# Patient Record
Sex: MALE | Race: WHITE | NOT HISPANIC OR LATINO | URBAN - METROPOLITAN AREA
[De-identification: names, ages, dates, MRNs, and addresses within clinical notes are randomized per-mention and may not be internally consistent; named-entity substitution may affect disease eponyms.]

---

## 2020-05-14 ENCOUNTER — APPOINTMENT (RX ONLY)
Dept: URBAN - METROPOLITAN AREA CLINIC 38 | Facility: CLINIC | Age: 80
Setting detail: DERMATOLOGY
End: 2020-05-14

## 2020-05-14 DIAGNOSIS — D22 MELANOCYTIC NEVI: ICD-10-CM

## 2020-05-14 DIAGNOSIS — L81.4 OTHER MELANIN HYPERPIGMENTATION: ICD-10-CM

## 2020-05-14 DIAGNOSIS — L57.8 OTHER SKIN CHANGES DUE TO CHRONIC EXPOSURE TO NONIONIZING RADIATION: ICD-10-CM

## 2020-05-14 DIAGNOSIS — D485 NEOPLASM OF UNCERTAIN BEHAVIOR OF SKIN: ICD-10-CM

## 2020-05-14 PROBLEM — D22.9 MELANOCYTIC NEVI, UNSPECIFIED: Status: ACTIVE | Noted: 2020-05-14

## 2020-05-14 PROBLEM — D48.5 NEOPLASM OF UNCERTAIN BEHAVIOR OF SKIN: Status: ACTIVE | Noted: 2020-05-14

## 2020-05-14 PROCEDURE — ? TREATMENT REGIMEN

## 2020-05-14 PROCEDURE — ? COUNSELING

## 2020-05-14 PROCEDURE — 99203 OFFICE O/P NEW LOW 30 MIN: CPT

## 2020-05-14 PROCEDURE — ? DEFER

## 2020-05-14 ASSESSMENT — LOCATION ZONE DERM
LOCATION ZONE: SCALP
LOCATION ZONE: FACE

## 2020-05-14 ASSESSMENT — LOCATION DETAILED DESCRIPTION DERM
LOCATION DETAILED: LEFT INFERIOR CENTRAL MALAR CHEEK
LOCATION DETAILED: RIGHT SUPERIOR PARIETAL SCALP
LOCATION DETAILED: LEFT CENTRAL FRONTAL SCALP
LOCATION DETAILED: RIGHT INFERIOR CENTRAL MALAR CHEEK
LOCATION DETAILED: RIGHT MEDIAL FRONTAL SCALP
LOCATION DETAILED: LEFT INFERIOR MEDIAL FOREHEAD
LOCATION DETAILED: RIGHT MEDIAL FOREHEAD
LOCATION DETAILED: LEFT SUPERIOR PARIETAL SCALP

## 2020-05-14 ASSESSMENT — LOCATION SIMPLE DESCRIPTION DERM
LOCATION SIMPLE: RIGHT FOREHEAD
LOCATION SIMPLE: SCALP
LOCATION SIMPLE: LEFT SCALP
LOCATION SIMPLE: RIGHT CHEEK
LOCATION SIMPLE: RIGHT SCALP
LOCATION SIMPLE: LEFT FOREHEAD
LOCATION SIMPLE: LEFT CHEEK

## 2020-05-14 NOTE — PROCEDURE: DEFER
Procedure To Be Performed At Next Visit: Biopsy by shave method
Detail Level: Detailed
Introduction Text (Please End With A Colon): The following procedure was deferred:
Instructions (Optional): SITE A. RIGHT ANTECUBITAL SPACE \\nSIZE: 0.6 cm x 1.2 cm\\nRULE OUT: BCC
Instructions (Optional): SITE B. CENTRAL MID SCALP\\nSIZE: 1.1 cm x 1.1 cm\\nRULE OUT: SCC

## 2020-05-14 NOTE — PROCEDURE: TREATMENT REGIMEN
Plan: 5-fu cream twice daily x 2 weeks- scalp first, then face.  Plan to start at next visit
Detail Level: Zone

## 2020-06-16 ENCOUNTER — APPOINTMENT (RX ONLY)
Dept: URBAN - METROPOLITAN AREA CLINIC 38 | Facility: CLINIC | Age: 80
Setting detail: DERMATOLOGY
End: 2020-06-16

## 2020-06-16 DIAGNOSIS — D485 NEOPLASM OF UNCERTAIN BEHAVIOR OF SKIN: ICD-10-CM

## 2020-06-16 DIAGNOSIS — D22 MELANOCYTIC NEVI: ICD-10-CM

## 2020-06-16 DIAGNOSIS — L57.8 OTHER SKIN CHANGES DUE TO CHRONIC EXPOSURE TO NONIONIZING RADIATION: ICD-10-CM

## 2020-06-16 DIAGNOSIS — L81.4 OTHER MELANIN HYPERPIGMENTATION: ICD-10-CM

## 2020-06-16 PROBLEM — D48.5 NEOPLASM OF UNCERTAIN BEHAVIOR OF SKIN: Status: ACTIVE | Noted: 2020-06-16

## 2020-06-16 PROBLEM — D22.9 MELANOCYTIC NEVI, UNSPECIFIED: Status: ACTIVE | Noted: 2020-06-16

## 2020-06-16 PROCEDURE — ? COUNSELING

## 2020-06-16 PROCEDURE — ? TREATMENT REGIMEN

## 2020-06-16 PROCEDURE — 11103 TANGNTL BX SKIN EA SEP/ADDL: CPT

## 2020-06-16 PROCEDURE — ? BIOPSY BY SHAVE METHOD

## 2020-06-16 PROCEDURE — 99213 OFFICE O/P EST LOW 20 MIN: CPT | Mod: 25

## 2020-06-16 PROCEDURE — 11102 TANGNTL BX SKIN SINGLE LES: CPT

## 2020-06-16 ASSESSMENT — LOCATION ZONE DERM
LOCATION ZONE: FACE
LOCATION ZONE: ARM
LOCATION ZONE: SCALP

## 2020-06-16 ASSESSMENT — LOCATION DETAILED DESCRIPTION DERM
LOCATION DETAILED: POSTERIOR MID-PARIETAL SCALP
LOCATION DETAILED: LEFT INFERIOR CENTRAL MALAR CHEEK
LOCATION DETAILED: RIGHT SUPERIOR PARIETAL SCALP
LOCATION DETAILED: RIGHT MEDIAL FRONTAL SCALP
LOCATION DETAILED: LEFT SUPERIOR PARIETAL SCALP
LOCATION DETAILED: RIGHT INFERIOR CENTRAL MALAR CHEEK
LOCATION DETAILED: RIGHT MEDIAL FOREHEAD
LOCATION DETAILED: RIGHT ANTECUBITAL SKIN
LOCATION DETAILED: LEFT INFERIOR MEDIAL FOREHEAD
LOCATION DETAILED: LEFT CENTRAL FRONTAL SCALP

## 2020-06-16 ASSESSMENT — LOCATION SIMPLE DESCRIPTION DERM
LOCATION SIMPLE: POSTERIOR SCALP
LOCATION SIMPLE: RIGHT CHEEK
LOCATION SIMPLE: RIGHT FOREHEAD
LOCATION SIMPLE: LEFT SCALP
LOCATION SIMPLE: RIGHT ELBOW
LOCATION SIMPLE: RIGHT SCALP
LOCATION SIMPLE: LEFT FOREHEAD
LOCATION SIMPLE: LEFT CHEEK
LOCATION SIMPLE: SCALP

## 2020-06-16 NOTE — PROCEDURE: TREATMENT REGIMEN
Plan: 5-fu cream twice daily x 2 weeks- scalp first, then face.  Defer for now discuss again at next visit
Detail Level: Zone

## 2020-06-16 NOTE — PROCEDURE: BIOPSY BY SHAVE METHOD
Body Location Override (Optional - Billing Will Still Be Based On Selected Body Map Location If Applicable): RIGHT ANTECUBITAL SPACE
Detail Level: Detailed
Depth Of Biopsy: dermis
Was A Bandage Applied: Yes
Size Of Lesion In Cm: 0.6
X Size Of Lesion In Cm: 1.2
Biopsy Type: H and E
Biopsy Method: Personna blade
Anesthesia Type: 1% lidocaine with epinephrine
Anesthesia Volume In Cc (Will Not Render If 0): 0.5
Additional Anesthesia Volume In Cc (Will Not Render If 0): 0
Hemostasis: Drysol
Wound Care: Bacitracin
Dressing: Band-Aid
Destruction After The Procedure: No
Type Of Destruction Used: Curettage
Curettage Text: The wound bed was treated with curettage after the biopsy was performed.
Cryotherapy Text: The wound bed was treated with cryotherapy after the biopsy was performed.
Electrodesiccation Text: The wound bed was treated with electrodesiccation after the biopsy was performed.
Electrodesiccation And Curettage Text: The wound bed was treated with electrodesiccation and curettage after the biopsy was performed.
Silver Nitrate Text: The wound bed was treated with silver nitrate after the biopsy was performed.
Lab: [id number]
Path Notes (To The Dermatopathologist): Size: 0.6 cm x 1.2 cm\\nRule Out: BCC
Consent: Written consent was obtained and risks were reviewed including but not limited to scarring, infection, bleeding, scabbing, incomplete removal, nerve damage and allergy to anesthesia.
Post-Care Instructions: I reviewed with the patient in detail post-care instructions. Patient is to keep the biopsy site dry overnight, and then apply bacitracin twice daily until healed. Patient may apply hydrogen peroxide soaks to remove any crusting.
Notification Instructions: Patient will be notified of biopsy results. However, patient instructed to call the office if not contacted within 2 weeks.
Billing Type: [employer]
Information: Selecting Yes will display possible errors in your note based on the variables you have selected. This validation is only offered as a suggestion for you. PLEASE NOTE THAT THE VALIDATION TEXT WILL BE REMOVED WHEN YOU FINALIZE YOUR NOTE. IF YOU WANT TO FAX A PRELIMINARY NOTE YOU WILL NEED TO TOGGLE THIS TO 'NO' IF YOU DO NOT WANT IT IN YOUR FAXED NOTE.
Body Location Override (Optional - Billing Will Still Be Based On Selected Body Map Location If Applicable): CENTRAL MID SCALP
Size Of Lesion In Cm: 1.1
Lab: [id number]
Path Notes (To The Dermatopathologist): Size: 1.1 cm x 1.1 cm\\nRule Out: SCC
Billing Type: Third-Party Bill

## 2020-07-01 ENCOUNTER — APPOINTMENT (RX ONLY)
Dept: URBAN - METROPOLITAN AREA CLINIC 38 | Facility: CLINIC | Age: 80
Setting detail: DERMATOLOGY
End: 2020-07-01

## 2020-07-01 DIAGNOSIS — D22 MELANOCYTIC NEVI: ICD-10-CM

## 2020-07-01 PROBLEM — D22.9 MELANOCYTIC NEVI, UNSPECIFIED: Status: ACTIVE | Noted: 2020-07-01

## 2020-07-01 PROBLEM — D04.9 CARCINOMA IN SITU OF SKIN, UNSPECIFIED: Status: ACTIVE | Noted: 2020-07-01

## 2020-07-01 PROCEDURE — 99213 OFFICE O/P EST LOW 20 MIN: CPT

## 2020-07-01 PROCEDURE — ? DEFER

## 2020-07-01 PROCEDURE — ? COUNSELING

## 2020-07-01 PROCEDURE — ? PATHOLOGY DISCUSSION

## 2020-07-01 PROCEDURE — ? ADDITIONAL NOTES

## 2020-07-01 NOTE — PROCEDURE: ADDITIONAL NOTES
Detail Level: Simple
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 Pt verbalized understanding and prefers to continue using Blood Root to tx.  Will maybe consider excision

## 2020-09-28 ENCOUNTER — APPOINTMENT (RX ONLY)
Dept: URBAN - METROPOLITAN AREA CLINIC 38 | Facility: CLINIC | Age: 80
Setting detail: DERMATOLOGY
End: 2020-09-28

## 2020-09-28 DIAGNOSIS — L57.0 ACTINIC KERATOSIS: ICD-10-CM | Status: INADEQUATELY CONTROLLED

## 2020-09-28 PROBLEM — D04.61 CARCINOMA IN SITU OF SKIN OF RIGHT UPPER LIMB, INCLUDING SHOULDER: Status: ACTIVE | Noted: 2020-09-28

## 2020-09-28 PROCEDURE — ? DEFER

## 2020-09-28 PROCEDURE — ? TREATMENT REGIMEN

## 2020-09-28 PROCEDURE — 99213 OFFICE O/P EST LOW 20 MIN: CPT

## 2020-09-28 PROCEDURE — ? COUNSELING

## 2020-09-28 ASSESSMENT — LOCATION DETAILED DESCRIPTION DERM
LOCATION DETAILED: RIGHT CENTRAL PARIETAL SCALP
LOCATION DETAILED: LEFT SUPERIOR MEDIAL FOREHEAD
LOCATION DETAILED: LEFT CENTRAL PARIETAL SCALP
LOCATION DETAILED: RIGHT SUPERIOR PARIETAL SCALP
LOCATION DETAILED: LEFT SUPERIOR PARIETAL SCALP

## 2020-09-28 ASSESSMENT — LOCATION ZONE DERM
LOCATION ZONE: FACE
LOCATION ZONE: SCALP

## 2020-09-28 ASSESSMENT — LOCATION SIMPLE DESCRIPTION DERM
LOCATION SIMPLE: LEFT FOREHEAD
LOCATION SIMPLE: SCALP

## 2020-09-28 NOTE — PROCEDURE: TREATMENT REGIMEN
Follow up neuro
Detail Level: Zone
Plan: No evidence of recurrence, will continue to observe. Pt continues to refuse treatment.

## 2020-09-28 NOTE — PROCEDURE: MIPS QUALITY
Quality 226: Preventive Care And Screening: Tobacco Use: Screening And Cessation Intervention: Patient screened for tobacco use and is an ex/non-smoker
Quality 111:Pneumonia Vaccination Status For Older Adults: Pneumococcal Vaccination Previously Received
Quality 130: Documentation Of Current Medications In The Medical Record: Current Medications Documented
Detail Level: Detailed
Quality 431: Preventive Care And Screening: Unhealthy Alcohol Use - Screening: Patient screened for unhealthy alcohol use using a single question and scores less than 2 times per year
Quality 110: Preventive Care And Screening: Influenza Immunization: Influenza immunization was not ordered or administered, reason not given

## 2020-09-28 NOTE — PROCEDURE: DEFER
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
Introduction Text (Please End With A Colon): The following procedure was deferred:
Detail Level: Detailed

## 2021-01-06 ENCOUNTER — APPOINTMENT (RX ONLY)
Dept: URBAN - METROPOLITAN AREA CLINIC 38 | Facility: CLINIC | Age: 81
Setting detail: DERMATOLOGY
End: 2021-01-06

## 2021-01-06 DIAGNOSIS — L57.0 ACTINIC KERATOSIS: ICD-10-CM

## 2021-01-06 PROCEDURE — ? COUNSELING

## 2021-01-06 PROCEDURE — 17004 DESTROY PREMAL LESIONS 15/>: CPT

## 2021-01-06 PROCEDURE — ? LIQUID NITROGEN

## 2021-01-06 ASSESSMENT — LOCATION DETAILED DESCRIPTION DERM
LOCATION DETAILED: RIGHT SUPERIOR FOREHEAD
LOCATION DETAILED: LEFT SUPERIOR PARIETAL SCALP
LOCATION DETAILED: LEFT SUPERIOR LATERAL NECK
LOCATION DETAILED: RIGHT MEDIAL FRONTAL SCALP
LOCATION DETAILED: RIGHT SUPERIOR HELIX
LOCATION DETAILED: RIGHT SUPERIOR PARIETAL SCALP
LOCATION DETAILED: RIGHT INFERIOR POSTAURICULAR SKIN
LOCATION DETAILED: LEFT CENTRAL FRONTAL SCALP
LOCATION DETAILED: LEFT MEDIAL FRONTAL SCALP
LOCATION DETAILED: RIGHT CENTRAL POSTAURICULAR SKIN
LOCATION DETAILED: LEFT INFERIOR LATERAL MALAR CHEEK
LOCATION DETAILED: LEFT MEDIAL ZYGOMA
LOCATION DETAILED: RIGHT SUPERIOR LATERAL NECK
LOCATION DETAILED: RIGHT SUPERIOR MEDIAL FOREHEAD
LOCATION DETAILED: POSTERIOR MID-PARIETAL SCALP
LOCATION DETAILED: LEFT SUPERIOR HELIX
LOCATION DETAILED: LEFT SUPERIOR FOREHEAD

## 2021-01-06 ASSESSMENT — LOCATION SIMPLE DESCRIPTION DERM
LOCATION SIMPLE: LEFT ZYGOMA
LOCATION SIMPLE: POSTERIOR SCALP
LOCATION SIMPLE: LEFT EAR
LOCATION SIMPLE: RIGHT EAR
LOCATION SIMPLE: LEFT FOREHEAD
LOCATION SIMPLE: RIGHT FOREHEAD
LOCATION SIMPLE: NECK
LOCATION SIMPLE: SCALP
LOCATION SIMPLE: LEFT SCALP
LOCATION SIMPLE: RIGHT SCALP
LOCATION SIMPLE: LEFT CHEEK

## 2021-01-06 ASSESSMENT — LOCATION ZONE DERM
LOCATION ZONE: NECK
LOCATION ZONE: SCALP
LOCATION ZONE: EAR
LOCATION ZONE: FACE

## 2021-01-06 NOTE — PROCEDURE: LIQUID NITROGEN
Number Of Freeze-Thaw Cycles: 3 freeze-thaw cycles
Duration Of Freeze Thaw-Cycle (Seconds): 3
Render Post-Care Instructions In Note?: no
Consent: The patient's consent was obtained including but not limited to risks of crusting, scabbing, blistering, scarring, darker or lighter pigmentary change, recurrence, incomplete removal and infection.
Post-Care Instructions: I reviewed with the patient in detail post-care instructions. Patient is to wear sunprotection, and avoid picking at any of the treated lesions. Pt may apply Vaseline to crusted or scabbing areas.
Detail Level: Detailed

## 2021-11-15 ENCOUNTER — APPOINTMENT (RX ONLY)
Dept: URBAN - METROPOLITAN AREA CLINIC 38 | Facility: CLINIC | Age: 81
Setting detail: DERMATOLOGY
End: 2021-11-15

## 2021-11-15 DIAGNOSIS — D22 MELANOCYTIC NEVI: ICD-10-CM

## 2021-11-15 DIAGNOSIS — L57.0 ACTINIC KERATOSIS: ICD-10-CM

## 2021-11-15 DIAGNOSIS — L82.0 INFLAMED SEBORRHEIC KERATOSIS: ICD-10-CM

## 2021-11-15 DIAGNOSIS — L81.4 OTHER MELANIN HYPERPIGMENTATION: ICD-10-CM

## 2021-11-15 DIAGNOSIS — D485 NEOPLASM OF UNCERTAIN BEHAVIOR OF SKIN: ICD-10-CM

## 2021-11-15 PROBLEM — D22.9 MELANOCYTIC NEVI, UNSPECIFIED: Status: ACTIVE | Noted: 2021-11-15

## 2021-11-15 PROBLEM — D48.5 NEOPLASM OF UNCERTAIN BEHAVIOR OF SKIN: Status: ACTIVE | Noted: 2021-11-15

## 2021-11-15 PROCEDURE — ? DEFER

## 2021-11-15 PROCEDURE — 99213 OFFICE O/P EST LOW 20 MIN: CPT

## 2021-11-15 PROCEDURE — ? SUNSCREEN RECOMMENDATIONS

## 2021-11-15 PROCEDURE — ? PRESCRIPTION

## 2021-11-15 PROCEDURE — ? COUNSELING

## 2021-11-15 RX ORDER — IMIQUIMOD 50 MG/G
CREAM TOPICAL
Qty: 24 | Refills: 3 | Status: ERX | COMMUNITY
Start: 2021-11-15

## 2021-11-15 RX ADMIN — IMIQUIMOD: 50 CREAM TOPICAL at 00:00

## 2021-11-15 ASSESSMENT — LOCATION SIMPLE DESCRIPTION DERM
LOCATION SIMPLE: CHEST
LOCATION SIMPLE: LEFT NOSE
LOCATION SIMPLE: LEFT FOREHEAD
LOCATION SIMPLE: CHIN
LOCATION SIMPLE: LEFT CHEEK
LOCATION SIMPLE: LEFT FOREARM
LOCATION SIMPLE: RIGHT CHEEK
LOCATION SIMPLE: RIGHT TEMPLE
LOCATION SIMPLE: RIGHT UPPER ARM
LOCATION SIMPLE: LEFT SCALP
LOCATION SIMPLE: RIGHT FOREARM

## 2021-11-15 ASSESSMENT — LOCATION ZONE DERM
LOCATION ZONE: NOSE
LOCATION ZONE: TRUNK
LOCATION ZONE: SCALP
LOCATION ZONE: ARM
LOCATION ZONE: FACE

## 2021-11-15 ASSESSMENT — LOCATION DETAILED DESCRIPTION DERM
LOCATION DETAILED: LEFT MEDIAL FRONTAL SCALP
LOCATION DETAILED: RIGHT MEDIAL SUPERIOR CHEST
LOCATION DETAILED: LEFT CENTRAL MALAR CHEEK
LOCATION DETAILED: LEFT NASAL SIDEWALL
LOCATION DETAILED: RIGHT DISTAL POSTERIOR UPPER ARM
LOCATION DETAILED: RIGHT CENTRAL TEMPLE
LOCATION DETAILED: LEFT PROXIMAL DORSAL FOREARM
LOCATION DETAILED: RIGHT INFERIOR CENTRAL MALAR CHEEK
LOCATION DETAILED: RIGHT CENTRAL MALAR CHEEK
LOCATION DETAILED: LEFT MEDIAL FOREHEAD
LOCATION DETAILED: RIGHT PROXIMAL DORSAL FOREARM
LOCATION DETAILED: LEFT CHIN

## 2021-11-15 NOTE — PROCEDURE: DEFER
Introduction Text (Please End With A Colon): The following procedure was deferred:
Detail Level: Detailed
Instructions (Optional): ARMS, FACE, NOSE, EARS, SCALP\\nAkâs x 15
Scheduling Instructions (Optional): [id number]
Procedure To Be Performed At Next Visit: Cryotherapy
Instructions (Optional): ISK x 20 Face
Scheduling Instructions (Optional): [address]
Instructions (Optional): A. Right distal brachium \\n0. 6 cm\\nR/o DN\\n\\nB. Right temple \\n1.1 cm \\nR/o SCC vs BCC\\n\\nC.  Right superior chest \\n1.3 cm \\nSCC vs BCC
Procedure To Be Performed At Next Visit: Biopsy by shave method
Scheduling Instructions (Optional): [address] [facility], [address] [facility], [id number]

## 2021-12-13 ENCOUNTER — APPOINTMENT (RX ONLY)
Dept: URBAN - METROPOLITAN AREA CLINIC 38 | Facility: CLINIC | Age: 81
Setting detail: DERMATOLOGY
End: 2021-12-13

## 2021-12-13 DIAGNOSIS — L82.0 INFLAMED SEBORRHEIC KERATOSIS: ICD-10-CM

## 2021-12-13 DIAGNOSIS — D22 MELANOCYTIC NEVI: ICD-10-CM

## 2021-12-13 DIAGNOSIS — D485 NEOPLASM OF UNCERTAIN BEHAVIOR OF SKIN: ICD-10-CM | Status: STABLE

## 2021-12-13 DIAGNOSIS — L57.0 ACTINIC KERATOSIS: ICD-10-CM

## 2021-12-13 DIAGNOSIS — L81.4 OTHER MELANIN HYPERPIGMENTATION: ICD-10-CM

## 2021-12-13 DIAGNOSIS — B07.8 OTHER VIRAL WARTS: ICD-10-CM

## 2021-12-13 PROBLEM — D22.9 MELANOCYTIC NEVI, UNSPECIFIED: Status: ACTIVE | Noted: 2021-12-13

## 2021-12-13 PROBLEM — D48.5 NEOPLASM OF UNCERTAIN BEHAVIOR OF SKIN: Status: ACTIVE | Noted: 2021-12-13

## 2021-12-13 PROCEDURE — 17003 DESTRUCT PREMALG LES 2-14: CPT | Mod: 59

## 2021-12-13 PROCEDURE — 11103 TANGNTL BX SKIN EA SEP/ADDL: CPT | Mod: 59

## 2021-12-13 PROCEDURE — 11102 TANGNTL BX SKIN SINGLE LES: CPT | Mod: 59

## 2021-12-13 PROCEDURE — ? BIOPSY BY SHAVE METHOD

## 2021-12-13 PROCEDURE — 11301 SHAVE SKIN LESION 0.6-1.0 CM: CPT

## 2021-12-13 PROCEDURE — ? COUNSELING

## 2021-12-13 PROCEDURE — ? PHOTO-DOCUMENTATION

## 2021-12-13 PROCEDURE — ? LIQUID NITROGEN

## 2021-12-13 PROCEDURE — ? SUNSCREEN RECOMMENDATIONS

## 2021-12-13 PROCEDURE — ? SHAVE REMOVAL

## 2021-12-13 PROCEDURE — 17110 DESTRUCTION B9 LES UP TO 14: CPT | Mod: 59

## 2021-12-13 PROCEDURE — 17000 DESTRUCT PREMALG LESION: CPT | Mod: 59

## 2021-12-13 PROCEDURE — ? DEFER

## 2021-12-13 ASSESSMENT — LOCATION ZONE DERM
LOCATION ZONE: ARM
LOCATION ZONE: FACE
LOCATION ZONE: FINGER
LOCATION ZONE: TRUNK
LOCATION ZONE: NOSE
LOCATION ZONE: SCALP
LOCATION ZONE: HAND

## 2021-12-13 ASSESSMENT — LOCATION DETAILED DESCRIPTION DERM
LOCATION DETAILED: RIGHT VENTRAL DISTAL FOREARM
LOCATION DETAILED: RIGHT PROXIMAL POSTERIOR UPPER ARM
LOCATION DETAILED: RIGHT CENTRAL MALAR CHEEK
LOCATION DETAILED: LEFT PROXIMAL POSTERIOR UPPER ARM
LOCATION DETAILED: RIGHT SUPERIOR PARIETAL SCALP
LOCATION DETAILED: LEFT SUPERIOR PARIETAL SCALP
LOCATION DETAILED: RIGHT PROXIMAL DORSAL FOREARM
LOCATION DETAILED: LEFT LATERAL MALAR CHEEK
LOCATION DETAILED: RIGHT RADIAL DORSAL HAND
LOCATION DETAILED: RIGHT MEDIAL SUPERIOR CHEST
LOCATION DETAILED: RIGHT MID RADIAL DORSAL INDEX FINGER
LOCATION DETAILED: LEFT CENTRAL TEMPLE
LOCATION DETAILED: LEFT PROXIMAL DORSAL FOREARM
LOCATION DETAILED: LEFT DISTAL DORSAL FOREARM
LOCATION DETAILED: LEFT SUPERIOR MEDIAL FOREHEAD
LOCATION DETAILED: RIGHT MEDIAL TEMPLE
LOCATION DETAILED: LEFT CENTRAL MALAR CHEEK
LOCATION DETAILED: RIGHT DISTAL DORSAL FOREARM
LOCATION DETAILED: RIGHT LATERAL ELBOW
LOCATION DETAILED: NASAL SUPRATIP
LOCATION DETAILED: NASAL DORSUM

## 2021-12-13 ASSESSMENT — LOCATION SIMPLE DESCRIPTION DERM
LOCATION SIMPLE: RIGHT ELBOW
LOCATION SIMPLE: LEFT FOREARM
LOCATION SIMPLE: RIGHT CHEEK
LOCATION SIMPLE: LEFT UPPER ARM
LOCATION SIMPLE: LEFT TEMPLE
LOCATION SIMPLE: LEFT FOREHEAD
LOCATION SIMPLE: RIGHT INDEX FINGER
LOCATION SIMPLE: CHEST
LOCATION SIMPLE: RIGHT FOREARM
LOCATION SIMPLE: LEFT CHEEK
LOCATION SIMPLE: SCALP
LOCATION SIMPLE: NOSE
LOCATION SIMPLE: RIGHT UPPER ARM
LOCATION SIMPLE: RIGHT TEMPLE
LOCATION SIMPLE: RIGHT HAND

## 2021-12-13 NOTE — PROCEDURE: LIQUID NITROGEN
Detail Level: Zone
Show Applicator Variable?: Yes
Render Note In Bullet Format When Appropriate: No
Number Of Freeze-Thaw Cycles: 3 freeze-thaw cycles
Consent: The patient's consent was obtained including but not limited to risks of crusting, scabbing, blistering, scarring, darker or lighter pigmentary change, recurrence, incomplete removal and infection.
Post-Care Instructions: I reviewed with the patient in detail post-care instructions. Patient is to wear sunprotection, and avoid picking at any of the treated lesions. Pt may apply Vaseline to crusted or scabbing areas.
Duration Of Freeze Thaw-Cycle (Seconds): 1
Medical Necessity Clause: This procedure was medically necessary because the lesions that were treated were:
Medical Necessity Information: It is in your best interest to select a reason for this procedure from the list below. All of these items fulfill various CMS LCD requirements except the new and changing color options.

## 2021-12-13 NOTE — PROCEDURE: BIOPSY BY SHAVE METHOD
Body Location Override (Optional - Billing Will Still Be Based On Selected Body Map Location If Applicable): RIGHT TEMPLE
Detail Level: Detailed
Depth Of Biopsy: dermis
Was A Bandage Applied: Yes
Size Of Lesion In Cm: 1.1
X Size Of Lesion In Cm: 0
Biopsy Type: H and E
Biopsy Method: Dermablade
Anesthesia Type: 1% lidocaine with epinephrine
Anesthesia Volume In Cc (Will Not Render If 0): 0.5
Hemostasis: Electrocautery
Wound Care: Bacitracin
Dressing: Band-Aid
Destruction After The Procedure: No
Type Of Destruction Used: Curettage
Curettage Text: The wound bed was treated with curettage after the biopsy was performed.
Cryotherapy Text: The wound bed was treated with cryotherapy after the biopsy was performed.
Electrodesiccation Text: The wound bed was treated with electrodesiccation after the biopsy was performed.
Electrodesiccation And Curettage Text: The wound bed was treated with electrodesiccation and curettage after the biopsy was performed.
Silver Nitrate Text: The wound bed was treated with silver nitrate after the biopsy was performed.
Path Notes (To The Dermatopathologist): Size: 1.1 cm\\nRule out: SCC vs. BCC
Consent: Written consent was obtained and risks were reviewed including but not limited to scarring, infection, bleeding, scabbing, incomplete removal, nerve damage and allergy to anesthesia.
Post-Care Instructions: I reviewed with the patient in detail post-care instructions. Patient is to keep the biopsy site dry overnight, and then apply bacitracin twice daily until healed. Patient may apply hydrogen peroxide soaks to remove any crusting.
Notification Instructions: Patient will be notified of biopsy results. However, patient instructed to call the office if not contacted within 2 weeks.
Billing Type: [employer]
Information: Selecting Yes will display possible errors in your note based on the variables you have selected. This validation is only offered as a suggestion for you. PLEASE NOTE THAT THE VALIDATION TEXT WILL BE REMOVED WHEN YOU FINALIZE YOUR NOTE. IF YOU WANT TO FAX A PRELIMINARY NOTE YOU WILL NEED TO TOGGLE THIS TO 'NO' IF YOU DO NOT WANT IT IN YOUR FAXED NOTE.
Body Location Override (Optional - Billing Will Still Be Based On Selected Body Map Location If Applicable): RIGHT SUPERIOR CHEST
Size Of Lesion In Cm: 1.3
Path Notes (To The Dermatopathologist): Size: 1.3 cm\\nRule out: SCC vs. BCC
Billing Type: Third-Party Bill

## 2021-12-13 NOTE — PROCEDURE: SHAVE REMOVAL
Medical Necessity Information: It is in your best interest to select a reason for this procedure from the list below. All of these items fulfill various CMS LCD requirements except the new and changing color options.
Medical Necessity Clause: This procedure was medically necessary because the lesion that was treated was
Body Location Override (Optional - Billing Will Still Be Based On Selected Body Map Location If Applicable): RIGHT DISTAL BRACHIUM
Detail Level: Detailed
Was A Bandage Applied: Yes
Size Of Lesion In Cm (Required): 0.6
X Size Of Lesion In Cm (Optional): 0
Biopsy Method: Dermablade
Anesthesia Type: 1% lidocaine with epinephrine
Anesthesia Volume In Cc: 0.5
Hemostasis: Electrocautery
Wound Care: Bacitracin
Path Notes (To The Dermatopathologist): Size: 0.6 cm \\nrule out: DN
Render Path Notes In Note?: No
Consent was obtained from the patient. The risks and benefits to therapy were discussed in detail. Specifically, the risks of infection, scarring, bleeding, prolonged wound healing, incomplete removal, allergy to anesthesia, nerve injury and recurrence were addressed. Prior to the procedure, the treatment site was clearly identified and confirmed by the patient. All components of Universal Protocol/PAUSE Rule completed.
Post-Care Instructions: I reviewed with the patient in detail post-care instructions. Patient is to keep the biopsy site dry overnight, and then apply bacitracin twice daily until healed. Patient may apply hydrogen peroxide soaks to remove any crusting.
Notification Instructions: Patient will be notified of pathology results. However, patient instructed to call the office if not contacted within 2 weeks.
Billing Type: [employer]

## 2021-12-13 NOTE — PROCEDURE: DEFER
Procedure To Be Performed At Next Visit: Liquid nitrogen
Detail Level: Detailed
Introduction Text (Please End With A Colon): Preferred codes deferred:\\[id number], [id number]
Instructions (Optional): RIGHT INDEX FINGER

## 2022-01-19 ENCOUNTER — APPOINTMENT (RX ONLY)
Dept: URBAN - METROPOLITAN AREA CLINIC 38 | Facility: CLINIC | Age: 82
Setting detail: DERMATOLOGY
End: 2022-01-19

## 2022-01-19 DIAGNOSIS — D22 MELANOCYTIC NEVI: ICD-10-CM

## 2022-01-19 DIAGNOSIS — L57.0 ACTINIC KERATOSIS: ICD-10-CM

## 2022-01-19 DIAGNOSIS — L82.1 OTHER SEBORRHEIC KERATOSIS: ICD-10-CM

## 2022-01-19 DIAGNOSIS — L81.4 OTHER MELANIN HYPERPIGMENTATION: ICD-10-CM

## 2022-01-19 PROBLEM — D22.9 MELANOCYTIC NEVI, UNSPECIFIED: Status: ACTIVE | Noted: 2022-01-19

## 2022-01-19 PROCEDURE — ? LIQUID NITROGEN

## 2022-01-19 PROCEDURE — ? COUNSELING

## 2022-01-19 PROCEDURE — 99213 OFFICE O/P EST LOW 20 MIN: CPT | Mod: 25

## 2022-01-19 PROCEDURE — ? SUNSCREEN RECOMMENDATIONS

## 2022-01-19 PROCEDURE — ? DEFER

## 2022-01-19 PROCEDURE — ? PATHOLOGY DISCUSSION

## 2022-01-19 PROCEDURE — 17003 DESTRUCT PREMALG LES 2-14: CPT

## 2022-01-19 PROCEDURE — 17000 DESTRUCT PREMALG LESION: CPT

## 2022-01-19 ASSESSMENT — LOCATION DETAILED DESCRIPTION DERM
LOCATION DETAILED: RIGHT INFERIOR FOREHEAD
LOCATION DETAILED: RIGHT MEDIAL SUPERIOR CHEST

## 2022-01-19 ASSESSMENT — LOCATION ZONE DERM
LOCATION ZONE: TRUNK
LOCATION ZONE: FACE

## 2022-01-19 ASSESSMENT — LOCATION SIMPLE DESCRIPTION DERM
LOCATION SIMPLE: CHEST
LOCATION SIMPLE: RIGHT FOREHEAD

## 2022-01-19 NOTE — PROCEDURE: DEFER
Procedure To Be Performed At Next Visit: Cryotherapy
Introduction Text (Please End With A Colon): Preferred codes deferred:\\[id number], [id number], [id number]
Detail Level: Detailed
Instructions (Optional): [address] [facility]

## 2022-01-19 NOTE — PROCEDURE: LIQUID NITROGEN
Show Applicator Variable?: Yes
Render Note In Bullet Format When Appropriate: No
Consent: The patient's consent was obtained including but not limited to risks of crusting, scabbing, blistering, scarring, darker or lighter pigmentary change, recurrence, incomplete removal and infection.
Detail Level: Zone
Number Of Freeze-Thaw Cycles: 3 freeze-thaw cycles
Post-Care Instructions: I reviewed with the patient in detail post-care instructions. Patient is to wear sunprotection, and avoid picking at any of the treated lesions. Pt may apply Vaseline to crusted or scabbing areas.
Duration Of Freeze Thaw-Cycle (Seconds): 1

## 2022-01-19 NOTE — PROCEDURE: MIPS QUALITY
Quality 226: Preventive Care And Screening: Tobacco Use: Screening And Cessation Intervention: Patient screened for tobacco use and is an ex/non-smoker
Quality 111:Pneumonia Vaccination Status For Older Adults: Pneumococcal Vaccination Previously Received
Quality 130: Documentation Of Current Medications In The Medical Record: Current Medications Documented
Detail Level: Detailed
Quality 431: Preventive Care And Screening: Unhealthy Alcohol Use - Screening: Patient screened for unhealthy alcohol use using a single question and scores less than 2 times per year
Quality 265: Biopsy Follow-Up: Biopsy results reviewed, communicated, tracked, and documented
Quality 110: Preventive Care And Screening: Influenza Immunization: Influenza immunization was not ordered or administered, reason not given

## 2022-06-03 ENCOUNTER — TELEPHONE (OUTPATIENT)
Dept: HEALTH INFORMATION MANAGEMENT | Facility: OTHER | Age: 82
End: 2022-06-03

## 2022-07-11 ENCOUNTER — OFFICE VISIT (OUTPATIENT)
Dept: INTERNAL MEDICINE | Facility: OTHER | Age: 82
End: 2022-07-11
Payer: MEDICARE

## 2022-07-11 VITALS
HEART RATE: 68 BPM | OXYGEN SATURATION: 95 % | DIASTOLIC BLOOD PRESSURE: 70 MMHG | BODY MASS INDEX: 27.7 KG/M2 | SYSTOLIC BLOOD PRESSURE: 127 MMHG | WEIGHT: 187 LBS | HEIGHT: 69 IN | TEMPERATURE: 98 F

## 2022-07-11 DIAGNOSIS — R21 FACIAL RASH: ICD-10-CM

## 2022-07-11 DIAGNOSIS — E11.9 TYPE 2 DIABETES MELLITUS WITHOUT COMPLICATION, UNSPECIFIED WHETHER LONG TERM INSULIN USE (HCC): ICD-10-CM

## 2022-07-11 DIAGNOSIS — I48.11 LONGSTANDING PERSISTENT ATRIAL FIBRILLATION (HCC): ICD-10-CM

## 2022-07-11 DIAGNOSIS — Z53.20 COLONOSCOPY REFUSED: ICD-10-CM

## 2022-07-11 DIAGNOSIS — H91.93 BILATERAL HEARING LOSS, UNSPECIFIED HEARING LOSS TYPE: ICD-10-CM

## 2022-07-11 DIAGNOSIS — I48.0 PAROXYSMAL ATRIAL FIBRILLATION (HCC): ICD-10-CM

## 2022-07-11 DIAGNOSIS — Z12.5 PROSTATE CANCER SCREENING: ICD-10-CM

## 2022-07-11 DIAGNOSIS — E03.9 HYPOTHYROIDISM, UNSPECIFIED TYPE: ICD-10-CM

## 2022-07-11 PROCEDURE — 99204 OFFICE O/P NEW MOD 45 MIN: CPT | Mod: GC

## 2022-07-11 RX ORDER — APIXABAN 5 MG/1
5 TABLET, FILM COATED ORAL 2 TIMES DAILY
COMMUNITY
Start: 2022-06-19 | End: 2022-07-20 | Stop reason: SDUPTHER

## 2022-07-11 RX ORDER — ZINC SULFATE 50(220)MG
50 CAPSULE ORAL NIGHTLY
COMMUNITY

## 2022-07-11 RX ORDER — THYROID 120 MG/1
120 TABLET ORAL DAILY
COMMUNITY
End: 2022-07-25 | Stop reason: SDUPTHER

## 2022-07-11 RX ORDER — VITAMIN B COMPLEX
5000 TABLET ORAL
COMMUNITY

## 2022-07-11 RX ORDER — GLIMEPIRIDE 2 MG/1
TABLET ORAL
COMMUNITY
Start: 2022-07-05 | End: 2022-07-25 | Stop reason: SDUPTHER

## 2022-07-11 RX ORDER — FUROSEMIDE 20 MG/1
20 TABLET ORAL 2 TIMES DAILY
COMMUNITY
End: 2022-07-25 | Stop reason: SDUPTHER

## 2022-07-11 RX ORDER — ASCORBIC ACID 500 MG
500 TABLET ORAL 2 TIMES DAILY
COMMUNITY

## 2022-07-11 RX ORDER — SIMVASTATIN 20 MG
20 TABLET ORAL EVERY EVENING
COMMUNITY
Start: 2022-05-25 | End: 2022-07-25 | Stop reason: SDUPTHER

## 2022-07-11 RX ORDER — METOPROLOL TARTRATE 50 MG/1
50 TABLET, FILM COATED ORAL 2 TIMES DAILY WITH MEALS
COMMUNITY
Start: 2022-06-08 | End: 2022-07-25 | Stop reason: SDUPTHER

## 2022-07-11 RX ORDER — LANCETS 28 GAUGE
EACH MISCELLANEOUS
COMMUNITY
Start: 2022-06-30 | End: 2022-08-01 | Stop reason: SDUPTHER

## 2022-07-11 RX ORDER — ASPIRIN 81 MG/1
81 TABLET, CHEWABLE ORAL DAILY
COMMUNITY
End: 2023-10-25

## 2022-07-11 ASSESSMENT — PATIENT HEALTH QUESTIONNAIRE - PHQ9: CLINICAL INTERPRETATION OF PHQ2 SCORE: 0

## 2022-07-11 NOTE — PATIENT INSTRUCTIONS
-You came to the clinic today to establish care with me.  -You are overall very healthy, we made no changes to medications at this time.  -Since he recently moved from [place], would like to request records from your cardiologist in [place], [place] and your PCP in [place], [place].  -I also ordered some basic lab work to establish a baseline: CBC, CMP, TSH, hemoglobin A1c, lipid panel, PSA.  -We also discussed your skin rashes, please continue to use moisturizing cream as discussed.  I will also place referral for dermatology.  -Return to clinic in 5 weeks

## 2022-07-11 NOTE — LETTER
[facility]  Mary Munoz M.D.  [address]   [place] [address]  Fax: [phone]   Authorization for Release/Disclosure of   Protected Health Information   Name: QI DIAZ LUDIVINA : 1940 SSN: [id number]   Address: [facility] [address]  [place] [address] Phone:    [phone] (home)    I authorize the entity listed below to release/disclose the PHI below to:   [facility]/Mary Munoz M.D. and Mary Munoz M.D.   Provider or Entity Name: Misael Moss MD   [address] [facility] [address]   Phone:  [phone]    Fax:     Reason for request: continuity of care   Information to be released:    [  ] LAST COLONOSCOPY,  including any PATH REPORT and follow-up  [  ] LAST FIT/COLOGUARD RESULT [  ] LAST DEXA  [  ] LAST MAMMOGRAM  [  ] LAST PAP  [  ] LAST LABS [  ] RETINA EXAM REPORT  [  ] IMMUNIZATION RECORDS  [ XXX ] Release all info      [  ] Check here and initial the line next to each item to release ALL health information INCLUDING  _____ Care and treatment for drug and / or alcohol abuse  _____ HIV testing, infection status, or AIDS  _____ Genetic Testing    DATES OF SERVICE OR TIME PERIOD TO BE DISCLOSED: _____________  I understand and acknowledge that:  * This Authorization may be revoked at any time by you in writing, except if your health information has already been used or disclosed.  * Your health information that will be used or disclosed as a result of you signing this authorization could be re-disclosed by the recipient. If this occurs, your re-disclosed health information may no longer be protected by State or Federal laws.  * You may refuse to sign this Authorization. Your refusal will not affect your ability to obtain treatment.  * This Authorization becomes effective upon signing and will  on (date) __________.      If no date is indicated, this Authorization will  one (1) year from the signature date.    Name: Qi JOE Owen    Signature:    Date:     7/11/2022       PLEASE FAX REQUESTED RECORDS BACK TO: [phone]

## 2022-07-11 NOTE — LETTER
[facility]  Mary Munoz M.D.  [address]   [place] [address]  Fax: [phone]   Authorization for Release/Disclosure of   Protected Health Information   Name: QI JOE LUDIVINA : 1940 SSN: [id number]   Address: [facility] [address]  [place] [address] Phone:    [phone] (home)    I authorize the entity listed below to release/disclose the PHI below to:   [facility]/Mary Munoz M.D. and Mary Munoz M.D.   Provider or Entity Name: [address] [address] Phone:  [phone]    Fax:     Reason for request: continuity of care   Information to be released:    [  ] LAST COLONOSCOPY,  including any PATH REPORT and follow-up  [  ] LAST FIT/COLOGUARD RESULT [  ] LAST DEXA  [  ] LAST MAMMOGRAM  [  ] LAST PAP  [  ] LAST LABS [  ] RETINA EXAM REPORT  [  ] IMMUNIZATION RECORDS  [ XXX ] Release all info      [  ] Check here and initial the line next to each item to release ALL health information INCLUDING  _____ Care and treatment for drug and / or alcohol abuse  _____ HIV testing, infection status, or AIDS  _____ Genetic Testing    DATES OF SERVICE OR TIME PERIOD TO BE DISCLOSED: _____________  I understand and acknowledge that:  * This Authorization may be revoked at any time by you in writing, except if your health information has already been used or disclosed.  * Your health information that will be used or disclosed as a result of you signing this authorization could be re-disclosed by the recipient. If this occurs, your re-disclosed health information may no longer be protected by State or Federal laws.  * You may refuse to sign this Authorization. Your refusal will not affect your ability to obtain treatment.  * This Authorization becomes effective upon signing and will  on (date) __________.      If no date is indicated, this Authorization will  one (1) year from the signature date.    Name: Qi Owen    Signature:    Date:     7/11/2022       PLEASE FAX REQUESTED RECORDS BACK TO: [phone]

## 2022-07-11 NOTE — PROGRESS NOTES
Subjective:     CC:  Diagnoses of Paroxysmal atrial fibrillation (HCC), Longstanding persistent atrial fibrillation (HCC), Type 2 diabetes mellitus without complication, unspecified whether long term insulin use (HCC), Hypothyroidism, unspecified type, Bilateral hearing loss, unspecified hearing loss type, Colonoscopy refused, Facial rash, and Prostate cancer screening were pertinent to this visit.    HISTORY OF THE PRESENT ILLNESS: Patient is a 81 y.o. male. This pleasant patient is here today to establish care.  His past medical history significant for atrial fibrillation, type 2 diabetes, and hypothyroidism.  Patient lived in [place] for over 50 years.  However, with the COVID-19 pandemic, patient's son was against getting the COVID-19 vaccine which was mandated by his work, so he quit his job of 23 years and moved to [place] to work for different company.  Since patient has no other family in [place], patient decided to move to [place] where his daughter and son-in-law lives.  He moved about 2 months ago.  Patient mentions a rash on his face that has been present for years, unchanging.  He says that after he moved to [place], he feels his skin getting more dry and the rash may be acting up.  He has used but species which was not cream and it makes the rash better.  He had a dermatology appointment but he canceled that because of insurance reasons, requesting a new dermatology referral.    Patient smoked 1 to 2 packs of cigarettes a day for 30 years, he says he quit 40 years ago. In the past, patient was a social drinker, also quit 40 years ago.  He denies any recreational or IV drug use.  Patient is currently retired and lives with his wife in a single story house without any distress.  He is ambulatory without the use of any cane or walker.    He has refused his colonoscopy in the past and is not up-to-date on his cancer screenings.  However, at this visit he is amenable to getting a Cologuard test for his  "cancer screening.  Patient was counseled that if the test is positive, we would recommend a follow-up colonoscopy which is the gold standard for colon cancer screening.    Patient has no other acute concerns or complaints at this time.  No medication changes were made at this time.    Our clinic will request records from your cardiologist (Dr. Misael Moss) in [facility], and your PCP at [facility] in [place], [place].      Problem   Paroxysmal Atrial Fibrillation (Hcc)   Longstanding Persistent Atrial Fibrillation (Hcc)   Type 2 Diabetes Mellitus Without Complication (Hcc)   Hypothyroidism   Facial Rash       Health Maintenance: Completed    ROS:   Review of Systems   Constitutional: Negative for chills, fever and weight loss.   HENT: Negative for congestion, hearing loss and sore throat.    Eyes: Negative for blurred vision, double vision and discharge.   Respiratory: Negative for cough, sputum production, shortness of breath and wheezing.    Cardiovascular: Negative for chest pain, palpitations, orthopnea, claudication and leg swelling.   Gastrointestinal: Negative for abdominal pain, blood in stool, constipation, diarrhea, heartburn, melena, nausea and vomiting.   Genitourinary: Negative for dysuria, frequency, hematuria and urgency.   Musculoskeletal: Negative for back pain, joint pain and neck pain.   Skin: Negative for rash.   Neurological: Negative for dizziness, sensory change, weakness and headaches.   Endo/Heme/Allergies: Negative for environmental allergies. Does not bruise/bleed easily.   Psychiatric/Behavioral: Negative for depression. The patient is not nervous/anxious and does not have insomnia.          Objective:     Exam: /70 (BP Location: Left arm, Patient Position: Sitting, BP Cuff Size: Adult)   Pulse 68   Temp 36.7 °C (98 °F) (Temporal)   Ht 1.753 m (5' 9\")   Wt 84.8 kg (187 lb)   SpO2 95%  Body mass index is 27.62 kg/m².    Physical Exam  Constitutional:  "      General: He is not in acute distress.     Appearance: Normal appearance. He is normal weight. He is not ill-appearing.   HENT:      Head: Normocephalic and atraumatic.      Right Ear: External ear normal.      Left Ear: External ear normal.      Nose: Nose normal. No rhinorrhea.      Mouth/Throat:      Mouth: Mucous membranes are moist.      Pharynx: Oropharynx is clear.   Eyes:      General:         Right eye: No discharge.         Left eye: No discharge.      Extraocular Movements: Extraocular movements intact.      Pupils: Pupils are equal, round, and reactive to light.   Neck:      Vascular: No carotid bruit.   Cardiovascular:      Rate and Rhythm: Normal rate and regular rhythm.      Pulses: Normal pulses.      Heart sounds: Normal heart sounds. No murmur heard.    No friction rub.   Pulmonary:      Effort: Pulmonary effort is normal. No respiratory distress.      Breath sounds: Normal breath sounds. No wheezing or rhonchi.   Abdominal:      General: Abdomen is flat. Bowel sounds are normal.      Palpations: Abdomen is soft.      Tenderness: There is no abdominal tenderness. There is no right CVA tenderness or left CVA tenderness.      Hernia: No hernia is present.   Musculoskeletal:         General: No tenderness. Normal range of motion.      Cervical back: Normal range of motion. No rigidity.      Right lower leg: No edema.      Left lower leg: No edema.   Skin:     General: Skin is warm and dry.      Capillary Refill: Capillary refill takes less than 2 seconds.      Findings: No lesion or rash.   Neurological:      General: No focal deficit present.      Mental Status: He is alert and oriented to person, place, and time. Mental status is at baseline.   Psychiatric:         Mood and Affect: Mood normal.         Behavior: Behavior normal.         Thought Content: Thought content normal.         Judgment: Judgment normal.       A chaperone was offered to the patient during today's exam. Patient declined  chaperone.    Labs:   No new labs to review this time    Assessment & Plan:   81 y.o. male with the following -    Problem List Items Addressed This Visit     Paroxysmal atrial fibrillation (HCC)    Relevant Medications    ELIQUIS 5 MG Tab    metoprolol tartrate (LOPRESSOR) 50 MG Tab    simvastatin (ZOCOR) 20 MG Tab    furosemide (LASIX) 20 MG Tab    Longstanding persistent atrial fibrillation (HCC)    Relevant Medications    ELIQUIS 5 MG Tab    metoprolol tartrate (LOPRESSOR) 50 MG Tab    simvastatin (ZOCOR) 20 MG Tab    furosemide (LASIX) 20 MG Tab    Other Relevant Orders    CBC WITH DIFFERENTIAL    REFERRAL TO CARDIOLOGY    Type 2 diabetes mellitus without complication (HCC)    Relevant Medications    glimepiride (AMARYL) 2 MG Tab    metformin (GLUCOPHAGE) 1000 MG tablet    Other Relevant Orders    Comp Metabolic Panel    HEMOGLOBIN A1C    Lipid Profile    Hypothyroidism    Relevant Medications    thyroid (ARMOUR THYROID) 120 MG Tab    Other Relevant Orders    TSH WITH REFLEX TO FT4    Facial rash    Relevant Orders    Referral to Dermatology      Other Visit Diagnoses     Bilateral hearing loss, unspecified hearing loss type        Relevant Orders    Referral to ENT    Colonoscopy refused        Relevant Orders    COLOGUARD (FIT DNA)    Prostate cancer screening        Relevant Orders    PROSTATE SPECIFIC AG DIAGNOSTIC          I spent a total of 48 minutes with record review, exam, communication with the patient, communication with other providers, and documentation of this encounter.    Return in about 5 weeks (around 8/15/2022).    Please note that this dictation was created using voice recognition software. I have made every reasonable attempt to correct obvious errors, but I expect that there are errors of grammar and possibly content that I did not discover before finalizing the note.

## 2022-07-13 PROBLEM — I48.11 LONGSTANDING PERSISTENT ATRIAL FIBRILLATION (HCC): Status: ACTIVE | Noted: 2022-07-13

## 2022-07-13 PROBLEM — E03.9 HYPOTHYROIDISM: Status: ACTIVE | Noted: 2022-07-13

## 2022-07-13 PROBLEM — R21 FACIAL RASH: Status: ACTIVE | Noted: 2022-07-13

## 2022-07-13 PROBLEM — E11.9 TYPE 2 DIABETES MELLITUS WITHOUT COMPLICATION (HCC): Status: ACTIVE | Noted: 2022-07-13

## 2022-07-13 PROBLEM — I48.0 PAROXYSMAL ATRIAL FIBRILLATION (HCC): Status: ACTIVE | Noted: 2022-07-13

## 2022-07-13 ASSESSMENT — ENCOUNTER SYMPTOMS
WEAKNESS: 0
ABDOMINAL PAIN: 0
SHORTNESS OF BREATH: 0
BRUISES/BLEEDS EASILY: 0
HEARTBURN: 0
DIARRHEA: 0
INSOMNIA: 0
COUGH: 0
DIZZINESS: 0
NERVOUS/ANXIOUS: 0
EYE DISCHARGE: 0
BACK PAIN: 0
VOMITING: 0
BLURRED VISION: 0
WEIGHT LOSS: 0
BLOOD IN STOOL: 0
DOUBLE VISION: 0
PALPITATIONS: 0
NAUSEA: 0
ORTHOPNEA: 0
CONSTIPATION: 0
FEVER: 0
HEADACHES: 0
SORE THROAT: 0
NECK PAIN: 0
CLAUDICATION: 0
WHEEZING: 0
SPUTUM PRODUCTION: 0
CHILLS: 0
SENSORY CHANGE: 0
DEPRESSION: 0

## 2022-07-19 ENCOUNTER — TELEPHONE (OUTPATIENT)
Dept: CARDIOLOGY | Facility: MEDICAL CENTER | Age: 82
End: 2022-07-19

## 2022-07-19 ENCOUNTER — HOSPITAL ENCOUNTER (OUTPATIENT)
Dept: LAB | Facility: MEDICAL CENTER | Age: 82
End: 2022-07-19
Attending: STUDENT IN AN ORGANIZED HEALTH CARE EDUCATION/TRAINING PROGRAM
Payer: MEDICARE

## 2022-07-19 DIAGNOSIS — I48.11 LONGSTANDING PERSISTENT ATRIAL FIBRILLATION (HCC): ICD-10-CM

## 2022-07-19 DIAGNOSIS — E03.9 HYPOTHYROIDISM, UNSPECIFIED TYPE: ICD-10-CM

## 2022-07-19 DIAGNOSIS — E11.9 TYPE 2 DIABETES MELLITUS WITHOUT COMPLICATION, UNSPECIFIED WHETHER LONG TERM INSULIN USE (HCC): ICD-10-CM

## 2022-07-19 DIAGNOSIS — Z12.5 PROSTATE CANCER SCREENING: ICD-10-CM

## 2022-07-19 LAB
ALBUMIN SERPL BCP-MCNC: 4.4 G/DL (ref 3.2–4.9)
ALBUMIN/GLOB SERPL: 1.7 G/DL
ALP SERPL-CCNC: 36 U/L (ref 30–99)
ALT SERPL-CCNC: 17 U/L (ref 2–50)
ANION GAP SERPL CALC-SCNC: 13 MMOL/L (ref 7–16)
AST SERPL-CCNC: 19 U/L (ref 12–45)
BASOPHILS # BLD AUTO: 0.4 % (ref 0–1.8)
BASOPHILS # BLD: 0.03 K/UL (ref 0–0.12)
BILIRUB SERPL-MCNC: 0.4 MG/DL (ref 0.1–1.5)
BUN SERPL-MCNC: 18 MG/DL (ref 8–22)
CALCIUM SERPL-MCNC: 9.6 MG/DL (ref 8.5–10.5)
CHLORIDE SERPL-SCNC: 100 MMOL/L (ref 96–112)
CHOLEST SERPL-MCNC: 150 MG/DL (ref 100–199)
CO2 SERPL-SCNC: 24 MMOL/L (ref 20–33)
CREAT SERPL-MCNC: 0.86 MG/DL (ref 0.5–1.4)
EOSINOPHIL # BLD AUTO: 0.13 K/UL (ref 0–0.51)
EOSINOPHIL NFR BLD: 1.9 % (ref 0–6.9)
ERYTHROCYTE [DISTWIDTH] IN BLOOD BY AUTOMATED COUNT: 49.5 FL (ref 35.9–50)
EST. AVERAGE GLUCOSE BLD GHB EST-MCNC: 183 MG/DL
FASTING STATUS PATIENT QL REPORTED: NORMAL
GFR SERPLBLD CREATININE-BSD FMLA CKD-EPI: 87 ML/MIN/1.73 M 2
GLOBULIN SER CALC-MCNC: 2.6 G/DL (ref 1.9–3.5)
GLUCOSE SERPL-MCNC: 144 MG/DL (ref 65–99)
HBA1C MFR BLD: 8 % (ref 4–5.6)
HCT VFR BLD AUTO: 41 % (ref 42–52)
HDLC SERPL-MCNC: 47 MG/DL
HGB BLD-MCNC: 13.5 G/DL (ref 14–18)
IMM GRANULOCYTES # BLD AUTO: 0.04 K/UL (ref 0–0.11)
IMM GRANULOCYTES NFR BLD AUTO: 0.6 % (ref 0–0.9)
LDLC SERPL CALC-MCNC: 75 MG/DL
LYMPHOCYTES # BLD AUTO: 2.03 K/UL (ref 1–4.8)
LYMPHOCYTES NFR BLD: 29.1 % (ref 22–41)
MCH RBC QN AUTO: 30.8 PG (ref 27–33)
MCHC RBC AUTO-ENTMCNC: 32.9 G/DL (ref 33.7–35.3)
MCV RBC AUTO: 93.4 FL (ref 81.4–97.8)
MONOCYTES # BLD AUTO: 0.6 K/UL (ref 0–0.85)
MONOCYTES NFR BLD AUTO: 8.6 % (ref 0–13.4)
NEUTROPHILS # BLD AUTO: 4.14 K/UL (ref 1.82–7.42)
NEUTROPHILS NFR BLD: 59.4 % (ref 44–72)
NRBC # BLD AUTO: 0 K/UL
NRBC BLD-RTO: 0 /100 WBC
PLATELET # BLD AUTO: 247 K/UL (ref 164–446)
PMV BLD AUTO: 10.1 FL (ref 9–12.9)
POTASSIUM SERPL-SCNC: 4.8 MMOL/L (ref 3.6–5.5)
PROT SERPL-MCNC: 7 G/DL (ref 6–8.2)
PSA SERPL-MCNC: 0.82 NG/ML (ref 0–4)
RBC # BLD AUTO: 4.39 M/UL (ref 4.7–6.1)
SODIUM SERPL-SCNC: 137 MMOL/L (ref 135–145)
TRIGL SERPL-MCNC: 142 MG/DL (ref 0–149)
TSH SERPL DL<=0.005 MIU/L-ACNC: 4.21 UIU/ML (ref 0.38–5.33)
WBC # BLD AUTO: 7 K/UL (ref 4.8–10.8)

## 2022-07-19 PROCEDURE — 80053 COMPREHEN METABOLIC PANEL: CPT

## 2022-07-19 PROCEDURE — 36415 COLL VENOUS BLD VENIPUNCTURE: CPT

## 2022-07-19 PROCEDURE — 80061 LIPID PANEL: CPT

## 2022-07-19 PROCEDURE — 84443 ASSAY THYROID STIM HORMONE: CPT

## 2022-07-19 PROCEDURE — 83036 HEMOGLOBIN GLYCOSYLATED A1C: CPT

## 2022-07-19 PROCEDURE — 84153 ASSAY OF PSA TOTAL: CPT

## 2022-07-19 PROCEDURE — 85025 COMPLETE CBC W/AUTO DIFF WBC: CPT

## 2022-07-19 NOTE — TELEPHONE ENCOUNTER
Chart Prep    S/W patient in regards to requesting outside records for NP appointment with Dr. Nagy.  Patient has been seen by a cardiologist in the past in [facility] at [facility] with Dr. Ajay Douglas. Any cardiac testing/imaging was done at his office.  Labs were most recently done with [facility] on 7/19/22.  All cardiac testing/imaging for [facility] has been requested from Dr. Douglas's office. Confirmation fax received and scanned into patient's chart. Patient verbally confirmed time/date/location of appt.    Dr. Ajay Douglas's office  Phone: [phone]  Fax: [phone]

## 2022-07-20 NOTE — TELEPHONE ENCOUNTER
Last seen: 7/11/22 by Dr. Kelley  Next appt: 8/19/22 with Dr. kelley    Was the patient seen in the last year in this department? Yes   Does patient have an active prescription for medications requested? No   Received Request Via: Pharmacy    Patient is about to run out of med. Can you please send in refill

## 2022-07-23 RX ORDER — APIXABAN 5 MG/1
5 TABLET, FILM COATED ORAL 2 TIMES DAILY
Qty: 60 TABLET | Refills: 3 | Status: SHIPPED | OUTPATIENT
Start: 2022-07-23 | End: 2023-01-26 | Stop reason: SDUPTHER

## 2022-07-25 ENCOUNTER — OFFICE VISIT (OUTPATIENT)
Dept: CARDIOLOGY | Facility: MEDICAL CENTER | Age: 82
End: 2022-07-25
Attending: STUDENT IN AN ORGANIZED HEALTH CARE EDUCATION/TRAINING PROGRAM
Payer: MEDICARE

## 2022-07-25 VITALS
OXYGEN SATURATION: 97 % | WEIGHT: 187 LBS | SYSTOLIC BLOOD PRESSURE: 126 MMHG | HEIGHT: 69 IN | HEART RATE: 81 BPM | DIASTOLIC BLOOD PRESSURE: 72 MMHG | BODY MASS INDEX: 27.7 KG/M2 | RESPIRATION RATE: 13 BRPM

## 2022-07-25 DIAGNOSIS — I48.0 PAROXYSMAL ATRIAL FIBRILLATION (HCC): ICD-10-CM

## 2022-07-25 DIAGNOSIS — I48.11 LONGSTANDING PERSISTENT ATRIAL FIBRILLATION (HCC): ICD-10-CM

## 2022-07-25 DIAGNOSIS — E11.9 TYPE 2 DIABETES MELLITUS WITHOUT COMPLICATION, UNSPECIFIED WHETHER LONG TERM INSULIN USE (HCC): ICD-10-CM

## 2022-07-25 DIAGNOSIS — E03.9 HYPOTHYROIDISM, UNSPECIFIED TYPE: Chronic | ICD-10-CM

## 2022-07-25 DIAGNOSIS — E03.9 HYPOTHYROIDISM, UNSPECIFIED TYPE: ICD-10-CM

## 2022-07-25 LAB — EKG IMPRESSION: NORMAL

## 2022-07-25 PROCEDURE — 93000 ELECTROCARDIOGRAM COMPLETE: CPT | Performed by: INTERNAL MEDICINE

## 2022-07-25 PROCEDURE — 99204 OFFICE O/P NEW MOD 45 MIN: CPT | Mod: 25 | Performed by: INTERNAL MEDICINE

## 2022-07-25 RX ORDER — SIMVASTATIN 20 MG
20 TABLET ORAL EVERY EVENING
Qty: 30 TABLET | Refills: 2 | Status: SHIPPED | OUTPATIENT
Start: 2022-07-25 | End: 2022-07-25 | Stop reason: SDUPTHER

## 2022-07-25 RX ORDER — METOPROLOL TARTRATE 50 MG/1
75 TABLET, FILM COATED ORAL 2 TIMES DAILY WITH MEALS
Qty: 90 TABLET | Refills: 3 | Status: SHIPPED | OUTPATIENT
Start: 2022-07-25 | End: 2023-06-26

## 2022-07-25 RX ORDER — FUROSEMIDE 20 MG/1
20 TABLET ORAL 2 TIMES DAILY
Qty: 60 TABLET | Refills: 2 | Status: SHIPPED | OUTPATIENT
Start: 2022-07-25 | End: 2022-07-25 | Stop reason: SDUPTHER

## 2022-07-25 RX ORDER — FERROUS SULFATE 325(65) MG
325 TABLET ORAL DAILY
COMMUNITY
End: 2022-07-25 | Stop reason: SDUPTHER

## 2022-07-25 RX ORDER — FERROUS SULFATE 325(65) MG
325 TABLET ORAL DAILY
Qty: 30 TABLET | Refills: 2 | Status: SHIPPED | OUTPATIENT
Start: 2022-07-25 | End: 2023-06-10 | Stop reason: SDUPTHER

## 2022-07-25 RX ORDER — DIGOXIN 125 MCG
0.12 TABLET ORAL DAILY
Qty: 30 TABLET | Refills: 1 | Status: SHIPPED | OUTPATIENT
Start: 2022-07-25 | End: 2022-11-14 | Stop reason: SDUPTHER

## 2022-07-25 RX ORDER — THYROID 120 MG/1
120 TABLET ORAL DAILY
Qty: 30 TABLET | Refills: 2 | Status: SHIPPED | OUTPATIENT
Start: 2022-07-25

## 2022-07-25 RX ORDER — GLIMEPIRIDE 2 MG/1
2 TABLET ORAL EVERY MORNING
Qty: 30 TABLET | Refills: 2 | Status: SHIPPED | OUTPATIENT
Start: 2022-07-25 | End: 2023-06-07

## 2022-07-25 ASSESSMENT — FIBROSIS 4 INDEX: FIB4 SCORE: 1.49

## 2022-07-25 ASSESSMENT — ENCOUNTER SYMPTOMS
CHANGE IN BOWEL HABIT: 0
HEMOPTYSIS: 0
WEIGHT LOSS: 0
IRREGULAR HEARTBEAT: 0
CHILLS: 0
ORTHOPNEA: 0
SPUTUM PRODUCTION: 1
WEIGHT GAIN: 0
DYSPNEA ON EXERTION: 0
PALPITATIONS: 0
DECREASED APPETITE: 0
SYNCOPE: 1
SHORTNESS OF BREATH: 0
COUGH: 0
ABDOMINAL PAIN: 0
NEAR-SYNCOPE: 0

## 2022-07-25 NOTE — ASSESSMENT & PLAN NOTE
First noted when admitted for pneumonia.  Outside ECG 10/28/2020 a. Fib 91 bpm reverted back to sinus on his own per outside notes.  Echo 10/28/2020: LVEF reported 35%  Report of cardiac cath 11/24/2020: nonobstructive CAD with normal LVEF.  On eliquis for stroke prevention of a. Fib

## 2022-07-25 NOTE — PATIENT INSTRUCTIONS
When you get back from your trip, stop the digoxin and increase your metoprolol to 75 mg orally twice a day.    2.   Echo has been ordered.    3.  I will see you back in after the echo has been completed.

## 2022-07-25 NOTE — TELEPHONE ENCOUNTER
Is the patient due for a refill? Yes    Was the patient seen the past year? Yes    Date of last office visit: 7/25/22    Does the patient have an upcoming appointment?  Yes   If yes, When? 11/14/22    Provider to refill:Dr. Nagy     Does the patients insurance require a 100 day supply?  Yes

## 2022-07-25 NOTE — PROGRESS NOTES
Cardiology Initial Consultation Note    Date of note:    7/25/2022    Primary Care Provider: Mary Munoz M.D.  Referring Provider: Sultan JAYLYN Del Cid M.D.    Patient Name: David Owen   YOB: 1940  MRN:              [id number]    Chief Complaint   Patient presents with   • Atrial Fibrillation     NP Dx: Paroxysmal atrial fibrillation (HCC)       History of Present Illness: Mr. David Owen is a 81 y.o. male whose current medical problems include persistent atrial fibrillation on eliquis, diabetes mellitus, who is here for cardiac consultation for atrial fibrillation.    Patient states that he has been doing fine.  He and his wife recently moved to [place] to be close to his daughter as his son moved away from [place].  He started walking 4-5 miles with his wife. Denies chest pain/discomfort.  Denies palpitations, no lightheadedness, no dizziness.    In terms of physical activity, he just started walking.    Cardiovascular Risk Factors:  1. Smoking status:   Tobacco Use: Medium Risk   • Smoking Tobacco Use: Former Smoker   • Smokeless Tobacco Use: Never Used     2. Type II Diabetes Mellitus:    Lab Results   Component Value Date/Time    HBA1C 8.0 (H) 07/19/2022 11:58 AM     Cholesterol,Tot   Date Value Ref Range Status   07/19/2022 150 100 - 199 mg/dL Final     LDL   Date Value Ref Range Status   07/19/2022 75 <100 mg/dL Final     HDL   Date Value Ref Range Status   07/19/2022 47 >=40 mg/dL Final     Triglycerides   Date Value Ref Range Status   07/19/2022 142 0 - 149 mg/dL Final     5. Family history of early Coronary Artery Disease in a first degree relative (Male less than 55 years of age; Female less than 65 years of age): none  6. Obesity and/or Metabolic Syndrome: none  7. Sedentary lifestyle: active    Patient Active Problem List   Diagnosis   • Longstanding persistent atrial fibrillation (HCC)   • Type 2 diabetes mellitus without complication (HCC)   • Hypothyroidism   •  Facial rash         Current Outpatient Medications   Medication Sig Dispense Refill   • Coral Calcium 1000 (390 Ca) MG Tab Take 1,000 mg by mouth 2 times a day.     • digoxin (LANOXIN) 125 MCG Tab Take 1 Tablet by mouth every day. 30 Tablet 1   • ferrous sulfate 325 (65 Fe) MG tablet Take 1 Tablet by mouth every day. Only taking 3 times a week 30 Tablet 2   • furosemide (LASIX) 20 MG Tab Take 1 Tablet by mouth 2 times a day. 60 Tablet 2   • glimepiride (AMARYL) 2 MG Tab Take 1 Tablet by mouth every morning. 30 Tablet 2   • metformin (GLUCOPHAGE) 1000 MG tablet Take 1 Tablet by mouth 2 times a day with meals. 60 Tablet 2   • metoprolol tartrate (LOPRESSOR) 50 MG Tab Take 1.5 Tablets by mouth 2 times a day with meals. 90 Tablet 3   • simvastatin (ZOCOR) 20 MG Tab Take 1 Tablet by mouth every evening. 30 Tablet 2   • thyroid (ARMOUR THYROID) 120 MG Tab Take 1 Tablet by mouth every day. 30 Tablet 2   • ELIQUIS 5 MG Tab Take 1 Tablet by mouth 2 times a day. 60 Tablet 3   • FreeStyle Lancets Misc      • aspirin (ASA) 81 MG Chew Tab chewable tablet Chew 81 mg every day.     • zinc sulfate (ZINCATE) 220 (50 Zn) MG Cap Take 50 mg by mouth every day.     • ascorbic acid (VITAMIN C) 500 MG tablet Take 500 mg by mouth every day.     • vitamin D3 (CHOLECALCIFEROL) 1000 Unit (25 mcg) Tab Take 1,000 Units by mouth every day.       No current facility-administered medications for this visit.         No Known Allergies      Family History:  Brother MI 38     Social History     Socioeconomic History   • Marital status:      Spouse name: Not on file   • Number of children: Not on file   • Years of education: Not on file   • Highest education level: Not on file   Occupational History   • Not on file   Tobacco Use   • Smoking status: Former Smoker   • Smokeless tobacco: Never Used   Vaping Use   • Vaping Use: Never used   Substance and Sexual Activity   • Alcohol use: Not Currently   • Drug use: Never   • Sexual activity: Yes      "Partners: Female   Other Topics Concern   • Not on file   Social History Narrative   • Not on file     Social Determinants of Health     Financial Resource Strain: Not on file   Food Insecurity: Not on file   Transportation Needs: Not on file   Physical Activity: Not on file   Stress: Not on file   Social Connections: Not on file   Intimate Partner Violence: Not on file   Housing Stability: Not on file       Review of Systems   Constitutional: Negative for chills, decreased appetite, weight gain and weight loss.   Cardiovascular: Positive for syncope. Negative for chest pain, dyspnea on exertion, irregular heartbeat, near-syncope, orthopnea and palpitations.   Respiratory: Positive for sputum production. Negative for cough, hemoptysis and shortness of breath.    Gastrointestinal: Negative for abdominal pain and change in bowel habit.         Physical Exam:  Ambulatory Vitals  /72 (BP Location: Left arm, Patient Position: Sitting, BP Cuff Size: Adult)   Pulse 81   Resp 13   Ht 1.753 m (5' 9\")   Wt 84.8 kg (187 lb)   SpO2 97%    Oxygen Therapy:  Pulse Oximetry: 97 %  BP Readings from Last 4 Encounters:   07/25/22 126/72   07/11/22 127/70       Weight/BMI: Body mass index is 27.62 kg/m².  Wt Readings from Last 4 Encounters:   07/25/22 84.8 kg (187 lb)   07/11/22 84.8 kg (187 lb)       Physical Exam  Constitutional:       Appearance: Normal appearance. He is normal weight.   HENT:      Head: Normocephalic and atraumatic.      Mouth/Throat:      Mouth: Mucous membranes are moist.      Pharynx: Oropharynx is clear.   Eyes:      Extraocular Movements: Extraocular movements intact.      Conjunctiva/sclera: Conjunctivae normal.   Cardiovascular:      Rate and Rhythm: Rhythm irregular.      Pulses: Normal pulses.      Heart sounds: Normal heart sounds.   Pulmonary:      Effort: Pulmonary effort is normal.      Breath sounds: Normal breath sounds.   Abdominal:      General: Abdomen is flat. Bowel sounds are normal. "      Palpations: Abdomen is soft.   Skin:     General: Skin is warm and dry.   Neurological:      Mental Status: He is alert.          Lab Data Review:  Lab Results   Component Value Date/Time    CHOLSTRLTOT 150 07/19/2022 11:58 AM    LDL 75 07/19/2022 11:58 AM    HDL 47 07/19/2022 11:58 AM    TRIGLYCERIDE 142 07/19/2022 11:58 AM       Lab Results   Component Value Date/Time    SODIUM 137 07/19/2022 11:58 AM    POTASSIUM 4.8 07/19/2022 11:58 AM    CHLORIDE 100 07/19/2022 11:58 AM    CO2 24 07/19/2022 11:58 AM    GLUCOSE 144 (H) 07/19/2022 11:58 AM    BUN 18 07/19/2022 11:58 AM    CREATININE 0.86 07/19/2022 11:58 AM     Lab Results   Component Value Date/Time    ALKPHOSPHAT 36 07/19/2022 11:58 AM    ASTSGOT 19 07/19/2022 11:58 AM    ALTSGPT 17 07/19/2022 11:58 AM    TBILIRUBIN 0.4 07/19/2022 11:58 AM      Lab Results   Component Value Date/Time    WBC 7.0 07/19/2022 11:58 AM     Lab Results   Component Value Date/Time    HBA1C 8.0 (H) 07/19/2022 11:58 AM         Cardiac Imaging and Procedures Review:    EKG dated 7/25/2022: My personal interpretation is a. Fib 81 bpm, low voltages throughout, nonspecific ST and T changes, abnormal R wave progression may be lead placement, no prior for comparison    Assessment & Plan     1.. Longstanding persistent atrial fibrillation (HCC)  Discussed pathophysiology of a. Fib with him.  Currently seems asymptomatic and rate controlled.  Would like to stop digoxin and increase metoprolol to 75 mg po bid.  He will do it after his trip.  Prior outside records mentioned decreased LVEF and fu echo but was not done.  No signs of heart failure, but will do fu echo for LVEF.  Followup after echo completed.    2. Hypothyroidism, unspecified type  On over the counter supplementation.  TSH normal range.  Will defer to his PCP for management.    Shared Medical Decision Making:  All of patient's excellent questions were answered to the best of my knowledge and to patient's satisfaction.  It was a  pleasure seeing Mr. David Owen in my clinic today.  Patient is aware to call the cardiology clinic with any questions or concerns.    I spent 50 minutes face to face time with the patient.    Marya Nagy MD  [facility]  [address]  [place] [address]  Phone:  [phone]  Fax:  [phone]    Please note that this dictation was created using voice recognition software. I have made every reasonable attempt to correct obvious errors, but it is possible there are errors of grammar and possibly content that I did not discover before finalizing the note.

## 2022-07-26 RX ORDER — FUROSEMIDE 20 MG/1
20 TABLET ORAL 2 TIMES DAILY
Qty: 200 TABLET | Refills: 0 | Status: SHIPPED | OUTPATIENT
Start: 2022-07-26 | End: 2022-11-01

## 2022-07-26 RX ORDER — SIMVASTATIN 20 MG
20 TABLET ORAL EVERY EVENING
Qty: 100 TABLET | Refills: 0 | Status: SHIPPED | OUTPATIENT
Start: 2022-07-26 | End: 2022-10-05

## 2022-08-01 ENCOUNTER — PATIENT MESSAGE (OUTPATIENT)
Dept: INTERNAL MEDICINE | Facility: OTHER | Age: 82
End: 2022-08-01
Payer: MEDICARE

## 2022-08-01 RX ORDER — LANCETS 28 GAUGE
EACH MISCELLANEOUS
Qty: 100 EACH | Refills: 3 | Status: SHIPPED | OUTPATIENT
Start: 2022-08-01 | End: 2022-10-20 | Stop reason: SDUPTHER

## 2022-08-01 NOTE — PATIENT COMMUNICATION
Hi can you please fill the lancets?    Last seen: 7/25/22 by Dr. Munoz  Next appt: 8/19/22  with Dr. Munoz    Was the patient seen in the last year in this department? Yes   Does patient have an active prescription for medications requested? No   Received Request Via: Pharmacy

## 2022-08-02 RX ORDER — LANCETS 28 GAUGE
EACH MISCELLANEOUS
Qty: 100 EACH | Refills: 3 | OUTPATIENT
Start: 2022-08-02

## 2022-08-02 RX ORDER — GLIMEPIRIDE 2 MG/1
2 TABLET ORAL EVERY MORNING
Qty: 90 TABLET | Refills: 0 | OUTPATIENT
Start: 2022-08-02

## 2022-08-02 NOTE — TELEPHONE ENCOUNTER
Glimepiride is not a cardiac medication. No additional refills approved. Future refills should come from PCP

## 2022-08-02 NOTE — TELEPHONE ENCOUNTER
Last seen: 07.11.2022 by Dr. Munoz  Next appt: 08.19.2022 with Dr. Munoz    Was the patient seen in the last year in this department? Yes   Does patient have an active prescription for medications requested? No   Received Request Via: Pharmacy

## 2022-08-09 ENCOUNTER — OFFICE VISIT (OUTPATIENT)
Dept: DERMATOLOGY | Facility: IMAGING CENTER | Age: 82
End: 2022-08-09
Payer: MEDICARE

## 2022-08-09 DIAGNOSIS — L57.0 ACTINIC KERATOSIS: ICD-10-CM

## 2022-08-09 PROCEDURE — 17000 DESTRUCT PREMALG LESION: CPT | Performed by: NURSE PRACTITIONER

## 2022-08-09 PROCEDURE — 17003 DESTRUCT PREMALG LES 2-14: CPT | Performed by: NURSE PRACTITIONER

## 2022-08-09 NOTE — PROGRESS NOTES
DERMATOLOGY NOTE  NEW VISIT       Chief complaint: Rash     Pt here for AKs on face and scalp, has tried light tx and ln2     No Hx of SC    No Known Allergies     MEDICATIONS:  Medications relevant to specialty reviewed.     REVIEW OF SYSTEMS:   Positive for depression  Negative for fevers and chills       EXAM:  There were no vitals taken for this visit.  Constitutional: Well-developed, well-nourished, and in no distress.     A focused skin exam was performed including the affected areas of the head (including face). Notable findings on exam today listed below and/or in assessment/plan.     Ill-defined erythematous gritty/scaly papules over the forehead, vertex and crown, bilateral temples, preauricular region and central face    IMPRESSION / PLAN:    1. Actinic keratosis  - [facility] education/counseling as noted below  CRYOTHERAPY:  Risks (including, but not limited to: skin discoloration, redness, blister, blood blister, recurrence, need for further treatment, infection, scar) and benefits of cryotherapy discussed. Patient verbally agreed to proceed with treatment. 1 cryotherapy freeze thaw cycles of 10 seconds were applied to 10 lesions on upper forehead, vertex and crown with cryac. Patient tolerated procedure well. Aftercare instructions given--no specific care needed unless irritated during healing process, can apply Vaseline with small band-aid if needed.  Discussed field tx, will come in October for PDT on face, winter for PDT on scalp      Discussed risks, benefits, alternative treatments as well as common side effects associated with prescribed treatment, Patient verbalized understanding and agrees with plan regarding the above           Please note that this dictation was created using voice recognition software. I have made every reasonable attempt to correct obvious errors, but I expect that there are errors of grammar and possibly content that I did not discover before finalizing the note.      Return to  clinic in: Return for October, PDT face for AKs. and as needed for any new or changing skin lesions.

## 2022-08-19 ENCOUNTER — OFFICE VISIT (OUTPATIENT)
Dept: INTERNAL MEDICINE | Facility: OTHER | Age: 82
End: 2022-08-19
Payer: MEDICARE

## 2022-08-19 VITALS
BODY MASS INDEX: 27.55 KG/M2 | HEIGHT: 69 IN | WEIGHT: 186 LBS | OXYGEN SATURATION: 98 % | HEART RATE: 88 BPM | TEMPERATURE: 98.4 F | DIASTOLIC BLOOD PRESSURE: 80 MMHG | SYSTOLIC BLOOD PRESSURE: 132 MMHG

## 2022-08-19 DIAGNOSIS — E11.9 TYPE 2 DIABETES MELLITUS WITHOUT COMPLICATION, WITHOUT LONG-TERM CURRENT USE OF INSULIN (HCC): ICD-10-CM

## 2022-08-19 DIAGNOSIS — I48.11 LONGSTANDING PERSISTENT ATRIAL FIBRILLATION (HCC): Chronic | ICD-10-CM

## 2022-08-19 PROCEDURE — 99213 OFFICE O/P EST LOW 20 MIN: CPT | Mod: GE

## 2022-08-19 ASSESSMENT — FIBROSIS 4 INDEX: FIB4 SCORE: 1.49

## 2022-08-19 NOTE — PATIENT INSTRUCTIONS
-He came to the clinic today for follow-up on your atrial fibrillation.  -You denied any chest pain, palpitations, shortness of breath, doing very well on blood thinner and metoprolol.  -We made no changes to your regimen today.  -Return to clinic in 3 months

## 2022-08-21 ASSESSMENT — ENCOUNTER SYMPTOMS
VOMITING: 0
BRUISES/BLEEDS EASILY: 0
WEIGHT LOSS: 0
WHEEZING: 0
COUGH: 0
CHILLS: 0
ORTHOPNEA: 0
NAUSEA: 0
SHORTNESS OF BREATH: 0
SORE THROAT: 0
PALPITATIONS: 0
CLAUDICATION: 0
DIZZINESS: 0
BACK PAIN: 0
HEADACHES: 0
SENSORY CHANGE: 0
DEPRESSION: 0
INSOMNIA: 0
BLURRED VISION: 0
SPUTUM PRODUCTION: 0
BLOOD IN STOOL: 0
WEAKNESS: 0
FEVER: 0
ABDOMINAL PAIN: 0
EYE DISCHARGE: 0
DOUBLE VISION: 0
NERVOUS/ANXIOUS: 0
CONSTIPATION: 0
HEARTBURN: 0
NECK PAIN: 0
DIARRHEA: 0

## 2022-08-22 NOTE — PROGRESS NOTES
Subjective:     CC: Follow-up    HPI:   David presents today to follow-up on his please visit.  Patient has a diagnosis of atrial fibrillation, for which she is medicated with rate control as well as anticoagulation with Eliquis.  Patient continues to do well.  Denies any acute concerns or complaints this time.  Patient says that he is heading for a trip for couple of months to see his son in [place].  He wanted to come to the clinic before he headed out for the trip.  Patient denies any chest pain, palpitations, shortness of breath, or any other concerns.  No medication changes were made at this time.    For his diabetes, patient's hemoglobin A1c in July 2022 was 8%.  Patient continues to be on metformin and glimepiride for diabetic management.  Consider discontinuing glimepiride after his return from the trip, given patient's age group.    Problem   Longstanding Persistent Atrial Fibrillation (Hcc)       Health Maintenance: Completed    ROS:  Review of Systems   Constitutional:  Negative for chills, fever and weight loss.   HENT:  Negative for congestion, hearing loss and sore throat.    Eyes:  Negative for blurred vision, double vision and discharge.   Respiratory:  Negative for cough, sputum production, shortness of breath and wheezing.    Cardiovascular:  Negative for chest pain, palpitations, orthopnea, claudication and leg swelling.   Gastrointestinal:  Negative for abdominal pain, blood in stool, constipation, diarrhea, heartburn, melena, nausea and vomiting.   Genitourinary:  Negative for dysuria, frequency, hematuria and urgency.   Musculoskeletal:  Negative for back pain, joint pain and neck pain.   Skin:  Negative for rash.   Neurological:  Negative for dizziness, sensory change, weakness and headaches.   Endo/Heme/Allergies:  Negative for environmental allergies. Does not bruise/bleed easily.   Psychiatric/Behavioral:  Negative for depression. The patient is not nervous/anxious and does not have  "insomnia.      Objective:     Exam:  /80 (BP Location: Left arm, Patient Position: Sitting, BP Cuff Size: Adult)   Pulse 88   Temp 36.9 °C (98.4 °F) (Temporal)   Ht 1.753 m (5' 9\")   Wt 84.4 kg (186 lb)   SpO2 98%   BMI 27.47 kg/m²  Body mass index is 27.47 kg/m².    Physical Exam  Constitutional:       General: He is not in acute distress.     Appearance: Normal appearance. He is normal weight. He is not ill-appearing.   HENT:      Head: Normocephalic and atraumatic.      Right Ear: External ear normal.      Left Ear: External ear normal.      Nose: Nose normal. No rhinorrhea.      Mouth/Throat:      Mouth: Mucous membranes are moist.      Pharynx: Oropharynx is clear.   Eyes:      General:         Right eye: No discharge.         Left eye: No discharge.      Extraocular Movements: Extraocular movements intact.      Pupils: Pupils are equal, round, and reactive to light.   Neck:      Vascular: No carotid bruit.   Cardiovascular:      Rate and Rhythm: Normal rate and regular rhythm.      Pulses: Normal pulses.      Heart sounds: Normal heart sounds. No murmur heard.    No friction rub.   Pulmonary:      Effort: Pulmonary effort is normal. No respiratory distress.      Breath sounds: Normal breath sounds. No wheezing or rhonchi.   Abdominal:      General: Abdomen is flat. Bowel sounds are normal.      Palpations: Abdomen is soft.      Tenderness: There is no abdominal tenderness. There is no right CVA tenderness or left CVA tenderness.      Hernia: No hernia is present.   Musculoskeletal:         General: No tenderness. Normal range of motion.      Cervical back: Normal range of motion. No rigidity.      Right lower leg: No edema.      Left lower leg: No edema.   Skin:     General: Skin is warm and dry.      Capillary Refill: Capillary refill takes less than 2 seconds.      Findings: No lesion or rash.   Neurological:      General: No focal deficit present.      Mental Status: He is alert and oriented to " person, place, and time. Mental status is at baseline.   Psychiatric:         Mood and Affect: Mood normal.         Behavior: Behavior normal.         Thought Content: Thought content normal.         Judgment: Judgment normal.       A chaperone was offered to the patient during today's exam. Patient declined chaperone.    Labs:   No new labs were reviewed at this time.    Assessment & Plan:     81 y.o. male with the following -     Problem List Items Addressed This Visit       Longstanding persistent atrial fibrillation (HCC) (Chronic)     Patient was first noted to have atrial fibrillation EKG when he was admitted to the hospital in October 2020.  Clinically and hemodynamically stable, denies any chest pain, palpitations, shortness of breath.  -Continue rate control with metoprolol  -Continue anticoagulation with Eliquis         Type 2 diabetes mellitus without complication (HCC) (Chronic)    Relevant Orders    Microalbumin Creat Ratio Urine - Clinic Collect    Referral to Ophthalmology    Diabetic Monofilament Lower Extremity Exam (Completed)       I spent a total of 29 minutes with record review, exam, communication with the patient, communication with other providers, and documentation of this encounter.    Return in about 3 months (around 11/19/2022).    Please note that this dictation was created using voice recognition software. I have made every reasonable attempt to correct obvious errors, but I expect that there are errors of grammar and possibly content that I did not discover before finalizing the note.

## 2022-08-22 NOTE — ASSESSMENT & PLAN NOTE
Patient was first noted to have atrial fibrillation EKG when he was admitted to the hospital in October 2020.  Clinically and hemodynamically stable, denies any chest pain, palpitations, shortness of breath.  -Continue rate control with metoprolol  -Continue anticoagulation with Eliquis

## 2022-10-04 ENCOUNTER — OFFICE VISIT (OUTPATIENT)
Dept: DERMATOLOGY | Facility: IMAGING CENTER | Age: 82
End: 2022-10-04
Payer: MEDICARE

## 2022-10-04 DIAGNOSIS — L57.0 ACTINIC KERATOSIS: ICD-10-CM

## 2022-10-04 PROCEDURE — 96573 PDT DSTR PRMLG LES PHYS/QHP: CPT | Performed by: NURSE PRACTITIONER

## 2022-10-04 NOTE — PROGRESS NOTES
DERMATOLOGY NOTE  FOLLOW UP VISIT       Chief complaint: Follow-Up (PDT Face)    Pt here today for PDT of face          PREV VISIT 08/09/2022:   Pt here for AKs on face and scalp, has tried light tx and ln2     No Hx of SC    No Known Allergies     MEDICATIONS:  Medications relevant to specialty reviewed.     REVIEW OF SYSTEMS:   Positive for depression  Negative for fevers and chills       EXAM:  There were no vitals taken for this visit.  Constitutional: Well-developed, well-nourished, and in no distress.     A focused skin exam was performed including the affected areas of the head (including face). Notable findings on exam today listed below and/or in assessment/plan.     Ill-defined erythematous gritty/scaly papules over the forehead, vertex and crown, bilateral temples, preauricular region and central face    IMPRESSION / PLAN:    1. Actinic keratosis  - NMSC education/counseling as noted below  CRYOTHERAPY:  Risks (including, but not limited to: skin discoloration, redness, blister, blood blister, recurrence, need for further treatment, infection, scar) and benefits of cryotherapy discussed. Patient verbally agreed to proceed with treatment. 1 cryotherapy freeze thaw cycles of 10 seconds were applied to 10 lesions on upper forehead, vertex and crown with cryac. Patient tolerated procedure well. Aftercare instructions given--no specific care needed unless irritated during healing process, can apply Vaseline with small band-aid if needed.  Discussed field tx, will come in October for PDT on face, winter for PDT on scalp      Discussed risks, benefits, alternative treatments as well as common side effects associated with prescribed treatment, Patient verbalized understanding and agrees with plan regarding the above           Please note that this dictation was created using voice recognition software. I have made every reasonable attempt to correct obvious errors, but I expect that there are errors of grammar  and possibly content that I did not discover before finalizing the note.      Return to clinic in: No follow-ups on file. and as needed for any new or changing skin lesions.

## 2022-10-04 NOTE — PROGRESS NOTES
S: Patient here for field treatment of actinic keratosis on face  PDT has previously been discussed in detail (vs. Cryo vs topical at home field therapy)     O: face with scattered gritty/erythematous papules     A/P: Actinic keratoses  I counseled the patient regarding the following:  The risks of PDT, including, but not limited to, pigmentary changes, pain, blistering, scabbing, redness, remote possibility of scarring, failed treatment, were discussed.   Post care discussed, including the absolute need to avoid sunlight for 2 days (48 hours) post-procedure, and the need to wear sun protection. Patient may experience sunburn like redness, discomfort, swelling, and scabbing. Must wear zinc oxide sunscreen at all times during the week of healing.    Patient aware to call me with any questions or concerns.  See ALA + blue light treatment was administered per treatment protocol scanned into patient chart (see attached).     RTC 3 months for post-procedure check     ANT Roth, LATANYAP-C

## 2022-10-21 RX ORDER — LANCETS 28 GAUGE
EACH MISCELLANEOUS
Qty: 100 EACH | Refills: 3 | Status: SHIPPED | OUTPATIENT
Start: 2022-10-21 | End: 2024-02-07

## 2022-10-21 NOTE — TELEPHONE ENCOUNTER
Last seen: 8.19.22 by Dr. Munoz  Next appt: 11.16.22 with Dr. Munoz    Was the patient seen in the last year in this department? Yes   Does patient have an active prescription for medications requested? No   Received Request Via: Pharmacy    (0) Normal symmetrical movements

## 2022-10-28 DIAGNOSIS — I48.11 LONGSTANDING PERSISTENT ATRIAL FIBRILLATION (HCC): ICD-10-CM

## 2022-10-28 DIAGNOSIS — I48.0 PAROXYSMAL ATRIAL FIBRILLATION (HCC): ICD-10-CM

## 2022-10-28 DIAGNOSIS — E11.9 TYPE 2 DIABETES MELLITUS WITHOUT COMPLICATION, UNSPECIFIED WHETHER LONG TERM INSULIN USE (HCC): ICD-10-CM

## 2022-10-28 DIAGNOSIS — E03.9 HYPOTHYROIDISM, UNSPECIFIED TYPE: ICD-10-CM

## 2022-10-31 ENCOUNTER — TELEPHONE (OUTPATIENT)
Dept: CARDIOLOGY | Facility: MEDICAL CENTER | Age: 82
End: 2022-10-31
Payer: MEDICARE

## 2022-10-31 NOTE — TELEPHONE ENCOUNTER
Is the patient due for a refill? Yes    Was the patient seen the past year? Yes    Date of last office visit: 7/25/22    Does the patient have an upcoming appointment?  Yes   If yes, When? 11/14/22    Provider to refill:MICHELE    Does the patients insurance require a 100 day supply?  Yes

## 2022-10-31 NOTE — TELEPHONE ENCOUNTER
Marya Nagy M.D.  VIBHA Andres,     Can you check with the patient if he had his metformin refilled?  If not, would have his PCP refill his metformin.     Thanks,   Marya bae msg informing pt to contact pcp for metformin prescription if has not been filled already.

## 2022-11-01 RX ORDER — FUROSEMIDE 20 MG/1
TABLET ORAL
Qty: 200 TABLET | Refills: 2 | Status: SHIPPED | OUTPATIENT
Start: 2022-11-01 | End: 2022-11-14

## 2022-11-02 DIAGNOSIS — E11.9 TYPE 2 DIABETES MELLITUS WITHOUT COMPLICATION, UNSPECIFIED WHETHER LONG TERM INSULIN USE (HCC): ICD-10-CM

## 2022-11-03 NOTE — TELEPHONE ENCOUNTER
Is the patient due for a refill? Yes    Was the patient seen the past year? Yes    Date of last office visit: 7/25/22    Does the patient have an upcoming appointment?  Yes   If yes, When? 11/14/22    Provider to refill:patito    Does the patients insurance require a 100 day supply?  Yes

## 2022-11-04 ENCOUNTER — HOSPITAL ENCOUNTER (OUTPATIENT)
Dept: CARDIOLOGY | Facility: MEDICAL CENTER | Age: 82
End: 2022-11-04
Attending: INTERNAL MEDICINE
Payer: MEDICARE

## 2022-11-04 DIAGNOSIS — I48.0 PAROXYSMAL ATRIAL FIBRILLATION (HCC): ICD-10-CM

## 2022-11-04 LAB
LV EJECT FRACT  99904: 50
LV EJECT FRACT MOD 2C 99903: 46.13
LV EJECT FRACT MOD 4C 99902: 50.92
LV EJECT FRACT MOD BP 99901: 47.38

## 2022-11-04 PROCEDURE — 93306 TTE W/DOPPLER COMPLETE: CPT | Mod: 26 | Performed by: INTERNAL MEDICINE

## 2022-11-04 PROCEDURE — 93306 TTE W/DOPPLER COMPLETE: CPT

## 2022-11-08 ENCOUNTER — DOCUMENTATION (OUTPATIENT)
Dept: HEALTH INFORMATION MANAGEMENT | Facility: OTHER | Age: 82
End: 2022-11-08
Payer: MEDICARE

## 2022-11-08 NOTE — PROGRESS NOTES
Cardiology Follow Up Note    Date of note: 11/7/2022    Primary Care Provider: Mary Munoz M.D.    Patient Name: David Owen  YOB: 1940  MRN:              [id number]    Reason for Followup: Echocardiogram results    History of Present Illness: Mr. David Owen is a 82 y.o. male whose current medical problems include persistent atrial fibrillation on Eliquis, diabetes mellitus who is here for cardiac for follow up echocardiogram results.    The patient is accompanied by his wife.  He has no new symptoms since the last visit.  He continues to walk 1 to 1-1/2 miles several times a week with his wife without any problems.  Denies any chest discomfort, no shortness of breath, no dyspnea on exertion, no orthopnea, no PND, no lightheadedness, no dizziness, no syncope.    Cardiovascular Risk Factors:  1. Smoking status:   Tobacco Use: Medium Risk    Smoking Tobacco Use: Former    Smokeless Tobacco Use: Never    Passive Exposure: Not on file     2. Type II Diabetes Mellitus: On metformin and glimepiride  Lab Results   Component Value Date/Time    HBA1C 8.0 (H) 07/19/2022 11:58 AM     3. Hypertension: On metoprolol   4. Dyslipidemia: Yes on simvastatin  Cholesterol,Tot   Date Value Ref Range Status   07/19/2022 150 100 - 199 mg/dL Final     LDL   Date Value Ref Range Status   07/19/2022 75 <100 mg/dL Final     HDL   Date Value Ref Range Status   07/19/2022 47 >=40 mg/dL Final     Triglycerides   Date Value Ref Range Status   07/19/2022 142 0 - 149 mg/dL Final     No problems updated.     History reviewed. No pertinent surgical history.     Current Outpatient Medications   Medication Sig Dispense Refill    furosemide (LASIX) 20 MG Tab Take 1 Tablet by mouth every day. 90 Tablet 3    digoxin (LANOXIN) 125 MCG Tab Take 1 Tablet by mouth every day. 30 Tablet 6    metformin (GLUCOPHAGE) 1000 MG tablet TAKE 1 TABLET BY MOUTH TWICE DAILY WITH MEALS 200 Tablet 2    FreeStyle Lancets Misc Please  "dispense lancets for blood sugar checks 100 Each 3    simvastatin (ZOCOR) 20 MG Tab TAKE 1 TABLET BY MOUTH EVERY EVENING 90 Tablet 3    Coral Calcium 1000 (390 Ca) MG Tab Take 1,000 mg by mouth 2 times a day.      ferrous sulfate 325 (65 Fe) MG tablet Take 1 Tablet by mouth every day. Only taking 3 times a week 30 Tablet 2    glimepiride (AMARYL) 2 MG Tab Take 1 Tablet by mouth every morning. 30 Tablet 2    metoprolol tartrate (LOPRESSOR) 50 MG Tab Take 1.5 Tablets by mouth 2 times a day with meals. 90 Tablet 3    thyroid (ARMOUR THYROID) 120 MG Tab Take 1 Tablet by mouth every day. 30 Tablet 2    ELIQUIS 5 MG Tab Take 1 Tablet by mouth 2 times a day. 60 Tablet 3    aspirin (ASA) 81 MG Chew Tab chewable tablet Chew 1 Tablet every day.      zinc sulfate (ZINCATE) 220 (50 Zn) MG Cap Take 50 mg by mouth every day.      ascorbic acid (VITAMIN C) 500 MG tablet Take 1 Tablet by mouth every day.      vitamin D3 (CHOLECALCIFEROL) 1000 Unit (25 mcg) Tab Take 1 Tablet by mouth every day.       No current facility-administered medications for this visit.       No Known Allergies    Review of Systems   Cardiovascular:  Negative for chest pain, dyspnea on exertion, leg swelling, near-syncope, orthopnea, palpitations, paroxysmal nocturnal dyspnea and syncope.   Respiratory: Negative.     Gastrointestinal:  Negative for hematochezia and melena.     Physical Exam:  Ambulatory Vitals  /84 (BP Location: Left arm, Patient Position: Sitting, BP Cuff Size: Adult)   Pulse 77   Resp 16   Ht 1.753 m (5' 9\")   Wt 83.9 kg (185 lb)   SpO2 92%    Oxygen Therapy:  Pulse Oximetry: 92 %  BP Readings from Last 4 Encounters:   11/14/22 128/84   08/19/22 132/80   07/25/22 126/72   07/11/22 127/70       Weight/BMI:   Body mass index is 27.32 kg/m².  Wt Readings from Last 2 Encounters:   11/14/22 83.9 kg (185 lb)   08/19/22 84.4 kg (186 lb)       Physical Exam  Constitutional:       Appearance: Normal appearance.   Neck:      Vascular: No " JVD.   Cardiovascular:      Rate and Rhythm: Normal rate. Rhythm irregular.      Pulses: Normal pulses and intact distal pulses.      Heart sounds: Normal heart sounds, S1 normal and S2 normal. No murmur heard.    No friction rub. No S3 or S4 sounds.   Pulmonary:      Effort: Pulmonary effort is normal. No respiratory distress.      Breath sounds: Normal breath sounds. No wheezing or rales.   Abdominal:      General: Bowel sounds are normal.      Palpations: Abdomen is soft.   Musculoskeletal:      Cervical back: Neck supple.   Skin:     General: Skin is warm and dry.   Neurological:      Mental Status: He is alert.        Lab Data Review:  Lab Results   Component Value Date/Time    SODIUM 137 07/19/2022 11:58 AM    POTASSIUM 4.8 07/19/2022 11:58 AM    CHLORIDE 100 07/19/2022 11:58 AM    CO2 24 07/19/2022 11:58 AM    GLUCOSE 144 (H) 07/19/2022 11:58 AM    BUN 18 07/19/2022 11:58 AM    CREATININE 0.86 07/19/2022 11:58 AM     Lab Results   Component Value Date/Time    ALKPHOSPHAT 36 07/19/2022 11:58 AM    ASTSGOT 19 07/19/2022 11:58 AM    ALTSGPT 17 07/19/2022 11:58 AM    TBILIRUBIN 0.4 07/19/2022 11:58 AM      Lab Results   Component Value Date/Time    WBC 7.0 07/19/2022 11:58 AM     Lab Results   Component Value Date/Time    TSHULTRASEN 4.210 07/19/2022 11:58 AM         Cardiac Imaging and Procedures Review:      Echo dated 11/4/2022: My personal interpretation is low normal left v with visually estimated LVEF 50% without entricular systolic function segmental wall motion abnormalities.  Preserved right ventricular systolic function.  Moderately dilated left atrial size and mildly dilated right atrial size.  Mild mitral regurgitation.  RV systolic function estimated at 32 mmHg plus RA pressure.  Normal aortic root size.    Assessment & Plan   1.  Chronic atrial fibrillation, rate controlled.  Discussed stopping digoxin and increasing metoprolol to 75 mg p.o. twice daily if needed.  However the patient would like to  stick with current digoxin 0.125 mg p.o. daily and metoprolol 50 mg p.o. twice daily.  Refill for digoxin was given.  His creatinine has remained stable and therefore should be fine.  He has been on digoxin since 2020 and has been stable.    2.  Continue Eliquis indefinitely for stroke prevention from atrial fibrillation.    3.  Low normal left ventricular ejection fraction based on recent echo.  Went over his echo results.  He has no signs of fluid overload.  He is taking Lasix 20 mg orally daily and his prescription was updated for that.    4.  Hyperlipidemia on simvastatin controlled.    5.  Diabetes mellitus on metformin and glimepiride.  He now has a primary care provider who will follow.    Shared Medical Decision Making:  All of patient's questions were answered to the best of my knowledge and to patient's satisfaction. It was a pleasure seeing Mr. David Owen in my clinic today. Return in about 1 year (around 11/14/2023). Patient is aware to call the cardiology clinic with any questions or concerns.    Marya Nagy MD  [facility]  [address]  [place] [address]  Phone:  [phone]  Fax:  [phone]    Please note that this dictation was created using voice recognition software. I have made every reasonable attempt to correct obvious errors, but it is possible there are errors of grammar and possibly content that I did not discover before finalizing the note.

## 2022-11-14 ENCOUNTER — OFFICE VISIT (OUTPATIENT)
Dept: CARDIOLOGY | Facility: MEDICAL CENTER | Age: 82
End: 2022-11-14
Payer: MEDICARE

## 2022-11-14 VITALS
BODY MASS INDEX: 27.4 KG/M2 | DIASTOLIC BLOOD PRESSURE: 84 MMHG | OXYGEN SATURATION: 92 % | SYSTOLIC BLOOD PRESSURE: 128 MMHG | HEIGHT: 69 IN | RESPIRATION RATE: 16 BRPM | HEART RATE: 77 BPM | WEIGHT: 185 LBS

## 2022-11-14 DIAGNOSIS — I48.11 LONGSTANDING PERSISTENT ATRIAL FIBRILLATION (HCC): ICD-10-CM

## 2022-11-14 DIAGNOSIS — E11.9 TYPE 2 DIABETES MELLITUS WITHOUT COMPLICATION, UNSPECIFIED WHETHER LONG TERM INSULIN USE (HCC): ICD-10-CM

## 2022-11-14 DIAGNOSIS — E03.9 HYPOTHYROIDISM, UNSPECIFIED TYPE: ICD-10-CM

## 2022-11-14 PROBLEM — R21 FACIAL RASH: Status: RESOLVED | Noted: 2022-07-13 | Resolved: 2022-11-14

## 2022-11-14 PROCEDURE — 99214 OFFICE O/P EST MOD 30 MIN: CPT | Performed by: INTERNAL MEDICINE

## 2022-11-14 RX ORDER — DIGOXIN 125 MCG
0.12 TABLET ORAL DAILY
Qty: 30 TABLET | Refills: 6 | Status: SHIPPED | OUTPATIENT
Start: 2022-11-14 | End: 2023-06-10 | Stop reason: SDUPTHER

## 2022-11-14 RX ORDER — FUROSEMIDE 20 MG/1
20 TABLET ORAL DAILY
Qty: 90 TABLET | Refills: 3 | Status: SHIPPED | OUTPATIENT
Start: 2022-11-14 | End: 2023-08-25 | Stop reason: SDUPTHER

## 2022-11-14 ASSESSMENT — ENCOUNTER SYMPTOMS
PND: 0
NEAR-SYNCOPE: 0
RESPIRATORY NEGATIVE: 1
PALPITATIONS: 0
HEMATOCHEZIA: 0
ORTHOPNEA: 0
DYSPNEA ON EXERTION: 0
SYNCOPE: 0

## 2022-11-14 ASSESSMENT — FIBROSIS 4 INDEX: FIB4 SCORE: 1.51

## 2022-11-16 ENCOUNTER — OFFICE VISIT (OUTPATIENT)
Dept: INTERNAL MEDICINE | Facility: OTHER | Age: 82
End: 2022-11-16
Payer: MEDICARE

## 2022-11-16 VITALS
OXYGEN SATURATION: 94 % | SYSTOLIC BLOOD PRESSURE: 132 MMHG | DIASTOLIC BLOOD PRESSURE: 75 MMHG | HEIGHT: 69 IN | WEIGHT: 189 LBS | TEMPERATURE: 98.1 F | HEART RATE: 70 BPM | BODY MASS INDEX: 27.99 KG/M2

## 2022-11-16 DIAGNOSIS — I48.11 LONGSTANDING PERSISTENT ATRIAL FIBRILLATION (HCC): Chronic | ICD-10-CM

## 2022-11-16 DIAGNOSIS — E11.9 TYPE 2 DIABETES MELLITUS WITHOUT COMPLICATION, WITHOUT LONG-TERM CURRENT USE OF INSULIN (HCC): Chronic | ICD-10-CM

## 2022-11-16 PROCEDURE — 99214 OFFICE O/P EST MOD 30 MIN: CPT | Mod: GC

## 2022-11-16 ASSESSMENT — FIBROSIS 4 INDEX: FIB4 SCORE: 1.51

## 2022-11-16 NOTE — PATIENT INSTRUCTIONS
- Continue taking all your medications as discussed, no changes to be made at this time.   - Discussed digoxin and metoprolol use  - Also discussed the hemoglobinc a1c levels and that is it ok to be at 8% and discussed less strict glucose controls at your age

## 2022-11-20 ASSESSMENT — ENCOUNTER SYMPTOMS
NERVOUS/ANXIOUS: 0
DIZZINESS: 0
DIARRHEA: 0
HEADACHES: 0
CHILLS: 0
COUGH: 0
BRUISES/BLEEDS EASILY: 0
FEVER: 0
HEARTBURN: 0
WEIGHT LOSS: 0
EYE DISCHARGE: 0
VOMITING: 0
DEPRESSION: 0
BLURRED VISION: 0
SENSORY CHANGE: 0
SORE THROAT: 0
PALPITATIONS: 0
ABDOMINAL PAIN: 0
SPUTUM PRODUCTION: 0
WHEEZING: 0
SHORTNESS OF BREATH: 0
CONSTIPATION: 0
NAUSEA: 0
WEAKNESS: 0
ORTHOPNEA: 0
CLAUDICATION: 0
NECK PAIN: 0
BLOOD IN STOOL: 0
INSOMNIA: 0
DOUBLE VISION: 0
BACK PAIN: 0

## 2022-11-21 NOTE — ASSESSMENT & PLAN NOTE
This is a chronic disease.  Patient follows up with cardiology regularly for this.  Last visit on 11/14/2022, discussion of the cardiology office was to decrease digoxin and uptitrate metoprolol, however, patient preferred to stay at current dosing and will reassess in the future.  Plan:  Continue regular follow-up with cardiology  Continue rate control with metoprolol and digoxin  Continue anticoagulation with Eliquis

## 2022-11-21 NOTE — PROGRESS NOTES
Subjective:     CC: Follow-up on previous visit    HPI:   David presents today for follow-up on his previous visit.  His past medical history is significant for longstanding persistent atrial fibrillation and he follows up with cardiology for that regularly.  He was recently seen by cardiology 2 days ago on 11/14/2022, and at the time discussions were held to decrease his digoxin and uptitrate his metoprolol.  However, patient requested to continue on prior doses at this time.    Patient has a history of type 2 diabetes without any complications, his last hemoglobin A1c is 8.0% on 7/19/2022.  Given patient's age and 82 years old, counseled patient that it is okay for him to be 88% and discussed less strict glucose control status stage.  Discussed risks of hypoglycemia including but not limited to falls and injury.  Patient acknowledges understanding of this plan.      No problems updated.    Health Maintenance: Completed    ROS:  Review of Systems   Constitutional:  Negative for chills, fever and weight loss.   HENT:  Negative for congestion, hearing loss and sore throat.    Eyes:  Negative for blurred vision, double vision and discharge.   Respiratory:  Negative for cough, sputum production, shortness of breath and wheezing.    Cardiovascular:  Negative for chest pain, palpitations, orthopnea, claudication and leg swelling.   Gastrointestinal:  Negative for abdominal pain, blood in stool, constipation, diarrhea, heartburn, melena, nausea and vomiting.   Genitourinary:  Negative for dysuria, frequency, hematuria and urgency.   Musculoskeletal:  Negative for back pain, joint pain and neck pain.   Skin:  Negative for rash.   Neurological:  Negative for dizziness, sensory change, weakness and headaches.   Endo/Heme/Allergies:  Negative for environmental allergies. Does not bruise/bleed easily.   Psychiatric/Behavioral:  Negative for depression. The patient is not nervous/anxious and does not have insomnia.   "    Objective:     Exam:  /75 (BP Location: Right arm, Patient Position: Sitting, BP Cuff Size: Adult)   Pulse 70   Temp 36.7 °C (98.1 °F) (Temporal)   Ht 1.753 m (5' 9\")   Wt 85.7 kg (189 lb)   SpO2 94%   BMI 27.91 kg/m²  Body mass index is 27.91 kg/m².    Physical Exam  Constitutional:       General: He is not in acute distress.     Appearance: Normal appearance. He is normal weight. He is not ill-appearing.   HENT:      Head: Normocephalic and atraumatic.      Right Ear: External ear normal.      Left Ear: External ear normal.      Nose: Nose normal. No rhinorrhea.      Mouth/Throat:      Mouth: Mucous membranes are moist.      Pharynx: Oropharynx is clear.   Eyes:      General:         Right eye: No discharge.         Left eye: No discharge.      Extraocular Movements: Extraocular movements intact.      Pupils: Pupils are equal, round, and reactive to light.   Neck:      Vascular: No carotid bruit.   Cardiovascular:      Rate and Rhythm: Normal rate and regular rhythm.      Pulses: Normal pulses.      Heart sounds: Normal heart sounds. No murmur heard.    No friction rub.   Pulmonary:      Effort: Pulmonary effort is normal. No respiratory distress.      Breath sounds: Normal breath sounds. No wheezing or rhonchi.   Abdominal:      General: Abdomen is flat. Bowel sounds are normal.      Palpations: Abdomen is soft.      Tenderness: There is no abdominal tenderness. There is no right CVA tenderness or left CVA tenderness.      Hernia: No hernia is present.   Musculoskeletal:         General: No tenderness. Normal range of motion.      Cervical back: Normal range of motion. No rigidity.      Right lower leg: No edema.      Left lower leg: No edema.   Skin:     General: Skin is warm and dry.      Capillary Refill: Capillary refill takes less than 2 seconds.      Findings: No lesion or rash.   Neurological:      General: No focal deficit present.      Mental Status: He is alert and oriented to person, " place, and time. Mental status is at baseline.   Psychiatric:         Mood and Affect: Mood normal.         Behavior: Behavior normal.         Thought Content: Thought content normal.         Judgment: Judgment normal.       A chaperone was offered to the patient during today's exam. Patient declined chaperone.    Labs:   Labs reviewed with the patient, no follow-up questions.      Assessment & Plan:     82 y.o. male with the following -     Type 2 diabetes mellitus without complication (HCC)  This is a chronic condition.  Patient does not have any complications of type 2 diabetes.  No retinopathy, no nephropathy, no neuropathy.  Denies any falls or changes in gait.  Patient continues to be on metformin 1000 mg, Amaryl 2 mg and is well controlled on these medications, tolerating them well.  Hemoglobin A1c of 8%, which is appropriate for his age.  Discussed risks of hypoglycemia with hyperglycemic control including but not limited to falls and injury.  Patient knowledges understanding of the plan.    Longstanding persistent atrial fibrillation (HCC)  This is a chronic disease.  Patient follows up with cardiology regularly for this.  Last visit on 11/14/2022, discussion of the cardiology office was to decrease digoxin and uptitrate metoprolol, however, patient preferred to stay at current dosing and will reassess in the future.  Plan:  Continue regular follow-up with cardiology  Continue rate control with metoprolol and digoxin  Continue anticoagulation with Eliquis    Type 2 diabetes mellitus without complication, without long-term current use of insulin (HCC)  - Referral to Ophthalmology  - Comp Metabolic Panel; Future  - Hemoglobin A1c; Future  - Lipid Profile; Future  - Microalbumin Creat Ratio Urine - Lab Collect; Future  - Diabetic Monofilament Lower Extremity Exam        I spent a total of 30 minutes with record review, exam, communication with the patient, communication with other providers, and documentation  of this encounter.    Return in about 6 months (around 5/16/2023).    Please note that this dictation was created using voice recognition software. I have made every reasonable attempt to correct obvious errors, but I expect that there are errors of grammar and possibly content that I did not discover before finalizing the note.

## 2022-11-21 NOTE — ASSESSMENT & PLAN NOTE
This is a chronic condition.  Patient does not have any complications of type 2 diabetes.  No retinopathy, no nephropathy, no neuropathy.  Denies any falls or changes in gait.  Patient continues to be on metformin 1000 mg, Amaryl 2 mg and is well controlled on these medications, tolerating them well.  Hemoglobin A1c of 8%, which is appropriate for his age.  Discussed risks of hypoglycemia with hyperglycemic control including but not limited to falls and injury.  Patient knowledges understanding of the plan.

## 2023-01-26 RX ORDER — APIXABAN 5 MG/1
5 TABLET, FILM COATED ORAL 2 TIMES DAILY
Qty: 180 TABLET | Refills: 1 | Status: SHIPPED | OUTPATIENT
Start: 2023-01-26 | End: 2023-04-30

## 2023-01-26 NOTE — TELEPHONE ENCOUNTER
Received request via: Pharmacy    Was the patient seen in the last year in this department? Yes last seen:11/16/2022 Dr Munoz   next:06/07/2023 Dr Munoz    Does the patient have an active prescription (recently filled or refills available) for medication(s) requested?  yes    Does the patient have longterm Plus and need 100 day supply (blood pressure, diabetes and cholesterol meds only)? Yes, quantity updated to 100 days

## 2023-02-14 ENCOUNTER — OFFICE VISIT (OUTPATIENT)
Dept: DERMATOLOGY | Facility: IMAGING CENTER | Age: 83
End: 2023-02-14
Payer: MEDICARE

## 2023-02-14 DIAGNOSIS — L57.0 ACTINIC KERATOSIS: ICD-10-CM

## 2023-02-14 PROCEDURE — 17000 DESTRUCT PREMALG LESION: CPT | Performed by: NURSE PRACTITIONER

## 2023-02-14 PROCEDURE — 17003 DESTRUCT PREMALG LES 2-14: CPT | Performed by: NURSE PRACTITIONER

## 2023-02-14 NOTE — PROGRESS NOTES
DERMATOLOGY NOTE  FOLLOW UP VISIT       Chief complaint: Follow-Up (PDT)  Pt  here to follow up after PDT on scalp and face. Pt stated it went really well      No Hx of SC    No Known Allergies     MEDICATIONS:  Medications relevant to specialty reviewed.     REVIEW OF SYSTEMS:   Positive for depression  Negative for fevers and chills       EXAM:  There were no vitals taken for this visit.  Constitutional: Well-developed, well-nourished, and in no distress.     A focused skin exam was performed including the affected areas of the head (including face). Notable findings on exam today listed below and/or in assessment/plan.     Ill-defined erythematous gritty/scaly papules over the forehead, vertex and crown, bilateral temples, preauricular region    IMPRESSION / PLAN:    1. Actinic keratosis  CRYOTHERAPY:  Risks (including, but not limited to: skin discoloration, redness, blister, blood blister, recurrence, need for further treatment, infection, scar) and benefits of cryotherapy discussed. Patient verbally agreed to proceed with treatment. 1 cryotherapy freeze thaw cycles of 10 seconds were applied to 6 lesions on areas as noted on exam with cryac. Patient tolerated procedure well. Aftercare instructions given--no specific care needed unless irritated during healing process, can apply Vaseline with small band-aid if needed.          Discussed risk associated with LN2, Patient verbalized understanding and agrees with plan regarding the above           Please note that this dictation was created using voice recognition software. I have made every reasonable attempt to correct obvious errors, but I expect that there are errors of grammar and possibly content that I did not discover before finalizing the note.      Return to clinic in: Return for CRISTY, May 2023. and as needed for any new or changing skin lesions.

## 2023-03-26 NOTE — PROCEDURE: MIPS QUALITY
[id number] [facility]
Quality 226: Preventive Care And Screening: Tobacco Use: Screening And Cessation Intervention: Patient screened for tobacco use and is an ex/non-smoker
Quality 111:Pneumonia Vaccination Status For Older Adults: Pneumococcal Vaccination Previously Received
Quality 130: Documentation Of Current Medications In The Medical Record: Current Medications Documented
Detail Level: Detailed
Quality 431: Preventive Care And Screening: Unhealthy Alcohol Use - Screening: Patient screened for unhealthy alcohol use using a single question and scores less than 2 times per year
Quality 110: Preventive Care And Screening: Influenza Immunization: Influenza immunization was not ordered or administered, reason not given

## 2023-04-27 NOTE — TELEPHONE ENCOUNTER
Received request via: Pharmacy    Was the patient seen in the last year in this department? Yes    Does the patient have an active prescription (recently filled or refills available) for medication(s) requested? No    Does the patient have long term Plus and need 100 day supply (blood pressure, diabetes and cholesterol meds only)? Yes

## 2023-04-30 RX ORDER — APIXABAN 5 MG/1
TABLET, FILM COATED ORAL
Qty: 180 TABLET | Refills: 1 | Status: SHIPPED | OUTPATIENT
Start: 2023-04-30 | End: 2023-05-03 | Stop reason: SDUPTHER

## 2023-05-05 RX ORDER — APIXABAN 5 MG/1
5 TABLET, FILM COATED ORAL 2 TIMES DAILY
Qty: 180 TABLET | Refills: 1 | Status: SHIPPED | OUTPATIENT
Start: 2023-05-05 | End: 2023-08-09 | Stop reason: SDUPTHER

## 2023-05-17 ENCOUNTER — OFFICE VISIT (OUTPATIENT)
Dept: DERMATOLOGY | Facility: IMAGING CENTER | Age: 83
End: 2023-05-17
Payer: MEDICARE

## 2023-05-17 DIAGNOSIS — L82.1 SK (SEBORRHEIC KERATOSIS): ICD-10-CM

## 2023-05-17 DIAGNOSIS — D22.9 MULTIPLE NEVI: ICD-10-CM

## 2023-05-17 DIAGNOSIS — L81.4 LENTIGINES: ICD-10-CM

## 2023-05-17 DIAGNOSIS — Z12.83 SKIN CANCER SCREENING: ICD-10-CM

## 2023-05-17 DIAGNOSIS — L57.0 ACTINIC KERATOSIS: ICD-10-CM

## 2023-05-17 DIAGNOSIS — D18.01 CHERRY ANGIOMA: ICD-10-CM

## 2023-05-17 PROCEDURE — 17000 DESTRUCT PREMALG LESION: CPT | Performed by: NURSE PRACTITIONER

## 2023-05-17 PROCEDURE — 99213 OFFICE O/P EST LOW 20 MIN: CPT | Mod: 25 | Performed by: NURSE PRACTITIONER

## 2023-05-17 PROCEDURE — 17003 DESTRUCT PREMALG LES 2-14: CPT | Performed by: NURSE PRACTITIONER

## 2023-05-17 NOTE — PROGRESS NOTES
DERMATOLOGY NOTE  NEW VISIT       Chief complaint: Follow-Up (susan)     Denies new, growing, changing, itching or bleeding skin lesions today.    History of skin cancer: No  History of precancers/actinic keratoses: Yes, Details: PDT tx 10/22  History of biopsies:No  History of blistering/severe sunburns:Yes, Details: child  Family history of skin cancer:No  Family history of atypical moles:No    No Known Allergies     MEDICATIONS:  Medications relevant to specialty reviewed.     REVIEW OF SYSTEMS:   Positive for skin (see HPI)  Negative for fevers and chills    EXAM:  There were no vitals taken for this visit.  Constitutional: Well-developed, well-nourished, and in no distress.     A total body skin exam was performed excluding the genitals per patient preference and including the following areas: head (including face), neck, chest, abdomen, groin/buttocks, back, bilateral upper extremities, and bilateral lower extremities with the following pertinent findings listed below and/or in assessment/plan.     -AKs on Lt temple, lt cheek, rt forehead, lt preauricular, rt jawline    -sun exposed skin of trunk and b/l upper, lower extremities and face with scattered clinically benign light brown reticulated macules all of which were morphologically similar and none of which were suspicious for skin cancer today on exam    -Several scattered 1-3mm bright red macules and thin papules on the trunk and extremities    -Multiple tan light brown skin-colored macules papules scattered over the trunk >> extremities--All with benign-appearing pigment network patterns on dermoscopy    -Several tan stuck-on waxy papules scattered on the trunk and extremities       IMPRESSION / PLAN:    1. Actinic keratosis  CRYOTHERAPY:  Risks (including, but not limited to: skin discoloration, redness, blister, blood blister, recurrence, need for further treatment, infection, scar) and benefits of cryotherapy discussed. Patient verbally agreed to  proceed with treatment. 1 cryotherapy freeze thaw cycles of 10 seconds were applied to 5 lesions on areas as noted on exam with cryac. Patient tolerated procedure well. Aftercare instructions given--no specific care needed unless irritated during healing process, can apply Vaseline with small band-aid if needed.      2. Lentigines  - Benign-appearing nature of lesions discussed during exam.   - Advised to continue to monitor for any return to clinic for new or concerning changes.      3. Cherry angioma  - Benign-appearing nature of lesions discussed during exam.   - Advised to continue to monitor for any return to clinic for new or concerning changes.      4. Multiple nevi  - Benign-appearing nature of lesions discussed during exam.   - Advised to continue to monitor for any return to clinic for new or concerning changes.  - ABCDE's of melanoma discussed/handout given      5. SK (seborrheic keratosis)  - Benign-appearing nature of lesions discussed during exam.   - Advised to continue to monitor for any return to clinic for new or concerning changes.      6. Skin cancer screening  Skin cancer education  discussed importance of sun protective clothing, eyewear in addition to the use of broad spectrum sunscreen with SPF 30 or greater, as well as need for reapplication ~every 2 hours when exposed to UVR  discussed importance following up for any new or changing lesions as noted in handout given, but every 12 months exams in clinic in the setting of dermatologic history  ABCDE's of melanoma discussed/handout given        Discussed risks associated with LN2, Patient verbalized understanding and agrees with plan regarding the above        Please note that this dictation was created using voice recognition software. I have made every reasonable attempt to correct obvious errors, but I expect that there are errors of grammar and possibly content that I did not discover before finalizing the note.      Return to clinic in:  Return in about 1 year (around 5/17/2024) for CRISTY. and as needed for any new or changing skin lesions.

## 2023-06-07 ENCOUNTER — OFFICE VISIT (OUTPATIENT)
Dept: INTERNAL MEDICINE | Facility: OTHER | Age: 83
End: 2023-06-07
Payer: MEDICARE

## 2023-06-07 VITALS
SYSTOLIC BLOOD PRESSURE: 135 MMHG | BODY MASS INDEX: 27.32 KG/M2 | TEMPERATURE: 97.7 F | HEART RATE: 66 BPM | OXYGEN SATURATION: 92 % | WEIGHT: 185 LBS | DIASTOLIC BLOOD PRESSURE: 84 MMHG

## 2023-06-07 DIAGNOSIS — I48.11 LONGSTANDING PERSISTENT ATRIAL FIBRILLATION (HCC): ICD-10-CM

## 2023-06-07 DIAGNOSIS — E11.9 TYPE 2 DIABETES MELLITUS WITHOUT COMPLICATION, UNSPECIFIED WHETHER LONG TERM INSULIN USE (HCC): ICD-10-CM

## 2023-06-07 DIAGNOSIS — E11.9 TYPE 2 DIABETES MELLITUS WITHOUT COMPLICATION, WITHOUT LONG-TERM CURRENT USE OF INSULIN (HCC): ICD-10-CM

## 2023-06-07 LAB
HBA1C MFR BLD: 7.8 % (ref ?–5.8)
POCT INT CON NEG: NEGATIVE
POCT INT CON POS: POSITIVE

## 2023-06-07 PROCEDURE — 99214 OFFICE O/P EST MOD 30 MIN: CPT | Mod: GC

## 2023-06-07 PROCEDURE — 3079F DIAST BP 80-89 MM HG: CPT | Mod: GC

## 2023-06-07 PROCEDURE — 83036 HEMOGLOBIN GLYCOSYLATED A1C: CPT | Mod: GC

## 2023-06-07 PROCEDURE — 3075F SYST BP GE 130 - 139MM HG: CPT | Mod: GC

## 2023-06-07 ASSESSMENT — ENCOUNTER SYMPTOMS
ORTHOPNEA: 0
DIZZINESS: 0
HEADACHES: 0
EYE DISCHARGE: 0
WEIGHT LOSS: 0
COUGH: 0
VOMITING: 0
SPUTUM PRODUCTION: 0
BLOOD IN STOOL: 0
NAUSEA: 0
CONSTIPATION: 0
HEARTBURN: 0
FEVER: 0
BACK PAIN: 0
SHORTNESS OF BREATH: 0
BRUISES/BLEEDS EASILY: 0
WEAKNESS: 0
NECK PAIN: 0
INSOMNIA: 0
DOUBLE VISION: 0
BLURRED VISION: 0
NERVOUS/ANXIOUS: 0
CHILLS: 0
SENSORY CHANGE: 0
SORE THROAT: 0
ABDOMINAL PAIN: 0
WHEEZING: 0
DEPRESSION: 0
PALPITATIONS: 0
DIARRHEA: 0
CLAUDICATION: 0

## 2023-06-07 ASSESSMENT — FIBROSIS 4 INDEX: FIB4 SCORE: 1.51

## 2023-06-07 NOTE — PROGRESS NOTES
Subjective:     CC: Follow-up on diabetes and atrial fibrillation    HPI:   David presents today to follow-up on his diabetes and atrial fibrillation.    Diabetes:  Patient previously had a hemoglobin A1c of 8%, currently on metformin 1000 mg twice daily and glimepiride 2 mg daily.  Patient denies any episodes of hypoglycemia, dizziness, headache, nausea/vomiting.  He is tolerating his medications well.  However, given his age and previous history of echocardiogram showing EF of 50%, patient may benefit from switching his glimepiride to SGLT2 inhibitors.  Therefore, at this visit,, provider was discontinued.  Additionally, patient was started on Jardiance at 10 mg daily.  Hemoglobin A1c at this visit was at 7.8%.    Atrial fibrillation:  This is a chronic problem, patient follows with cardiology for this regularly.  Currently on Eliquis, metoprolol, and digoxin 0.125.  No acute episodes or concerns with patient.  Continue current medication, continue following up with cardiology.      ROS:  Review of Systems   Constitutional:  Negative for chills, fever and weight loss.   HENT:  Negative for congestion, hearing loss and sore throat.    Eyes:  Negative for blurred vision, double vision and discharge.   Respiratory:  Negative for cough, sputum production, shortness of breath and wheezing.    Cardiovascular:  Negative for chest pain, palpitations, orthopnea, claudication and leg swelling.   Gastrointestinal:  Negative for abdominal pain, blood in stool, constipation, diarrhea, heartburn, melena, nausea and vomiting.   Genitourinary:  Negative for dysuria, frequency, hematuria and urgency.   Musculoskeletal:  Negative for back pain, joint pain and neck pain.   Skin:  Negative for rash.   Neurological:  Negative for dizziness, sensory change, weakness and headaches.   Endo/Heme/Allergies:  Negative for environmental allergies. Does not bruise/bleed easily.   Psychiatric/Behavioral:  Negative for depression. The patient  is not nervous/anxious and does not have insomnia.        Objective:     Exam:  /84 (BP Location: Right arm, Patient Position: Sitting, BP Cuff Size: Adult long)   Pulse 66   Temp 36.5 °C (97.7 °F) (Temporal)   Wt 83.9 kg (185 lb)   SpO2 92%   BMI 27.32 kg/m²  Body mass index is 27.32 kg/m².    Physical Exam  Constitutional:       General: He is not in acute distress.     Appearance: Normal appearance. He is normal weight. He is not ill-appearing.   HENT:      Head: Normocephalic and atraumatic.      Right Ear: External ear normal.      Left Ear: External ear normal.      Nose: Nose normal. No rhinorrhea.      Mouth/Throat:      Mouth: Mucous membranes are moist.      Pharynx: Oropharynx is clear.   Eyes:      General:         Right eye: No discharge.         Left eye: No discharge.      Extraocular Movements: Extraocular movements intact.      Pupils: Pupils are equal, round, and reactive to light.   Neck:      Vascular: No carotid bruit.   Cardiovascular:      Rate and Rhythm: Normal rate and regular rhythm.      Pulses: Normal pulses.      Heart sounds: Normal heart sounds. No murmur heard.     No friction rub.   Pulmonary:      Effort: Pulmonary effort is normal. No respiratory distress.      Breath sounds: Normal breath sounds. No wheezing or rhonchi.   Abdominal:      General: Abdomen is flat. Bowel sounds are normal.      Palpations: Abdomen is soft.      Tenderness: There is no abdominal tenderness. There is no right CVA tenderness or left CVA tenderness.      Hernia: No hernia is present.   Musculoskeletal:         General: No tenderness. Normal range of motion.      Cervical back: Normal range of motion. No rigidity.      Right lower leg: No edema.      Left lower leg: No edema.   Skin:     General: Skin is warm and dry.      Capillary Refill: Capillary refill takes less than 2 seconds.      Findings: No lesion or rash.   Neurological:      General: No focal deficit present.      Mental Status:  He is alert and oriented to person, place, and time. Mental status is at baseline.   Psychiatric:         Mood and Affect: Mood normal.         Behavior: Behavior normal.         Thought Content: Thought content normal.         Judgment: Judgment normal.       A chaperone was offered to the patient during today's exam.: Patient declined.    Labs:   POCT Hgb A1c: 7.8%      Assessment & Plan:     82 y.o. male with the following -     1. Type 2 diabetes mellitus without complication, without long-term current use of insulin ([facility])  Patient previously had a hemoglobin A1c of 8%, currently on metformin 1000 mg twice daily and glimepiride 2 mg daily.  Patient denies any episodes of hypoglycemia, dizziness, headache, nausea/vomiting.  He is tolerating his medications well.  However, given his age and previous history of echocardiogram showing EF of 50%, patient may benefit from switching his glimepiride to SGLT2 inhibitors.  Therefore, at this visit,, provider was discontinued.  Additionally, patient was started on Jardiance at 10 mg daily.  Hemoglobin A1c at this visit was at 7.8%.  - POCT  A1C  - Empagliflozin (JARDIANCE) 10 MG Tab tablet; Take 1 Tablet by mouth every day.  Dispense: 30 Tablet; Refill: 1    2. Longstanding persistent atrial fibrillation (HCC)  This is a chronic problem, patient follows with cardiology for this regularly.  Currently on Eliquis, metoprolol, and digoxin 0.125.  No acute episodes or concerns with patient.  Continue current medication, continue following up with cardiology.  - Empagliflozin (JARDIANCE) 10 MG Tab tablet; Take 1 Tablet by mouth every day.  Dispense: 30 Tablet; Refill: 1      I spent a total of 30 minutes with record review, exam, communication with the patient, communication with other providers, and documentation of this encounter.    Return in about 3 months (around 9/7/2023).    Please note that this dictation was created using voice recognition software. I have made every  reasonable attempt to correct obvious errors, but I expect that there are errors of grammar and possibly content that I did not discover before finalizing the note.

## 2023-06-10 DIAGNOSIS — E03.9 HYPOTHYROIDISM, UNSPECIFIED TYPE: ICD-10-CM

## 2023-06-10 DIAGNOSIS — I48.91 ATRIAL FIBRILLATION, UNSPECIFIED TYPE (HCC): ICD-10-CM

## 2023-06-10 DIAGNOSIS — E11.9 TYPE 2 DIABETES MELLITUS WITHOUT COMPLICATION, UNSPECIFIED WHETHER LONG TERM INSULIN USE (HCC): ICD-10-CM

## 2023-06-10 DIAGNOSIS — D64.9 ANEMIA, UNSPECIFIED TYPE: ICD-10-CM

## 2023-06-10 DIAGNOSIS — I48.11 LONGSTANDING PERSISTENT ATRIAL FIBRILLATION (HCC): ICD-10-CM

## 2023-06-10 DIAGNOSIS — I48.0 PAROXYSMAL ATRIAL FIBRILLATION (HCC): ICD-10-CM

## 2023-06-12 RX ORDER — SIMVASTATIN 20 MG
20 TABLET ORAL EVERY EVENING
Qty: 100 TABLET | Refills: 1 | Status: SHIPPED | OUTPATIENT
Start: 2023-06-12 | End: 2023-10-25

## 2023-06-12 RX ORDER — FERROUS SULFATE 325(65) MG
325 TABLET ORAL DAILY
Qty: 100 TABLET | Refills: 1 | Status: SHIPPED | OUTPATIENT
Start: 2023-06-12 | End: 2024-01-05

## 2023-06-12 RX ORDER — DIGOXIN 125 MCG
0.12 TABLET ORAL DAILY
Qty: 30 TABLET | Refills: 6 | Status: SHIPPED | OUTPATIENT
Start: 2023-06-12 | End: 2023-06-27

## 2023-06-12 NOTE — TELEPHONE ENCOUNTER
Is the patient due for a refill? Yes    Was the patient seen the past year? Yes    Date of last office visit: 11/14/2022    Does the patient have an upcoming appointment?  No   If yes, When?     Provider to refill:MICHELE CAIN out of office    Does the patients insurance require a 100 day supply?  Yes

## 2023-06-17 DIAGNOSIS — I48.0 PAROXYSMAL ATRIAL FIBRILLATION (HCC): ICD-10-CM

## 2023-06-19 NOTE — TELEPHONE ENCOUNTER
Is the patient due for a refill? Yes    Was the patient seen the past year? Yes    Date of last office visit: 11/14/2022    Does the patient have an upcoming appointment?  No   If yes, When?     Provider to refill:CH, CH and YARY out of office    Does the patients insurance require a 100 day supply?  Yes

## 2023-06-26 RX ORDER — METOPROLOL TARTRATE 50 MG/1
50 TABLET, FILM COATED ORAL 2 TIMES DAILY
Qty: 180 TABLET | Refills: 1 | Status: SHIPPED | OUTPATIENT
Start: 2023-06-26 | End: 2023-06-27 | Stop reason: SDUPTHER

## 2023-06-27 ENCOUNTER — OFFICE VISIT (OUTPATIENT)
Dept: CARDIOLOGY | Facility: MEDICAL CENTER | Age: 83
End: 2023-06-27
Payer: MEDICARE

## 2023-06-27 VITALS
HEIGHT: 69 IN | HEART RATE: 83 BPM | OXYGEN SATURATION: 96 % | BODY MASS INDEX: 26.51 KG/M2 | WEIGHT: 179 LBS | SYSTOLIC BLOOD PRESSURE: 116 MMHG | RESPIRATION RATE: 16 BRPM | DIASTOLIC BLOOD PRESSURE: 76 MMHG

## 2023-06-27 DIAGNOSIS — I48.0 PAROXYSMAL ATRIAL FIBRILLATION (HCC): ICD-10-CM

## 2023-06-27 DIAGNOSIS — I48.11 LONGSTANDING PERSISTENT ATRIAL FIBRILLATION (HCC): Chronic | ICD-10-CM

## 2023-06-27 DIAGNOSIS — R06.02 SHORTNESS OF BREATH: ICD-10-CM

## 2023-06-27 DIAGNOSIS — E78.49 OTHER HYPERLIPIDEMIA: ICD-10-CM

## 2023-06-27 PROCEDURE — 99212 OFFICE O/P EST SF 10 MIN: CPT

## 2023-06-27 PROCEDURE — 99214 OFFICE O/P EST MOD 30 MIN: CPT

## 2023-06-27 PROCEDURE — 99213 OFFICE O/P EST LOW 20 MIN: CPT

## 2023-06-27 PROCEDURE — 3078F DIAST BP <80 MM HG: CPT

## 2023-06-27 PROCEDURE — 3074F SYST BP LT 130 MM HG: CPT

## 2023-06-27 RX ORDER — METOPROLOL TARTRATE 50 MG/1
50 TABLET, FILM COATED ORAL 2 TIMES DAILY
Qty: 200 TABLET | Refills: 3 | Status: SHIPPED | OUTPATIENT
Start: 2023-06-27 | End: 2023-10-03 | Stop reason: SDUPTHER

## 2023-06-27 RX ORDER — BLOOD-GLUCOSE METER
KIT MISCELLANEOUS
COMMUNITY
Start: 2023-05-04

## 2023-06-27 ASSESSMENT — ENCOUNTER SYMPTOMS
DEPRESSION: 0
PND: 0
SHORTNESS OF BREATH: 1
PALPITATIONS: 0
EYES NEGATIVE: 1
MUSCULOSKELETAL NEGATIVE: 1
NEUROLOGICAL NEGATIVE: 1
ORTHOPNEA: 0
CONSTITUTIONAL NEGATIVE: 1
NERVOUS/ANXIOUS: 0
GASTROINTESTINAL NEGATIVE: 1

## 2023-06-27 ASSESSMENT — FIBROSIS 4 INDEX: FIB4 SCORE: 1.51

## 2023-06-27 NOTE — PATIENT INSTRUCTIONS
Follow up with Renee CAIN after echocardiogram in about 3 months  Stop digoxin and monitor heart rate

## 2023-06-27 NOTE — PROGRESS NOTES
Chief Complaint   Patient presents with    Atrial Fibrillation       Subjective     David Owen is a 82 y.o. male who presents today for evaluation of his shortness of breath. They have a history of chronic atrial fibrillation, hyperlipidemia, hypothyroidism and diabetes    They are accompanied today by their wife    They were last seen by Dr. Nagy on 11/14/2022, at that visit he was clinically stable and no major changes    Since their last visit they have been feeling shortness of breath with activity class II symptoms, getting worse over the past few months    No symptoms of chest pain, palpitations, or lower extremity edema.      History reviewed. No pertinent past medical history.  History reviewed. No pertinent surgical history.  Family History   Problem Relation Age of Onset    Heart Attack Brother 38     Social History     Socioeconomic History    Marital status:      Spouse name: Not on file    Number of children: Not on file    Years of education: Not on file    Highest education level: Not on file   Occupational History    Not on file   Tobacco Use    Smoking status: Former    Smokeless tobacco: Never   Vaping Use    Vaping Use: Never used   Substance and Sexual Activity    Alcohol use: Not Currently    Drug use: Never    Sexual activity: Yes     Partners: Female   Other Topics Concern    Not on file   Social History Narrative    Not on file     Social Determinants of Health     Financial Resource Strain: Not on file   Food Insecurity: Not on file   Transportation Needs: Not on file   Physical Activity: Not on file   Stress: Not on file   Social Connections: Not on file   Intimate Partner Violence: Not on file   Housing Stability: Not on file     No Known Allergies  Outpatient Encounter Medications as of 6/27/2023   Medication Sig Dispense Refill    metoprolol tartrate (LOPRESSOR) 50 MG Tab Take 1 Tablet by mouth 2 times a day. 180 Tablet 1    metformin (GLUCOPHAGE) 1000 MG tablet  "Take 1 Tablet by mouth 2 times a day with meals. 200 Tablet 1    ferrous sulfate 325 (65 Fe) MG tablet Take 1 Tablet by mouth every day. Only taking 3 times a week 100 Tablet 1    digoxin (LANOXIN) 125 MCG Tab Take 1 Tablet by mouth every day. 30 Tablet 6    simvastatin (ZOCOR) 20 MG Tab Take 1 Tablet by mouth every evening. 100 Tablet 1    Empagliflozin (JARDIANCE) 10 MG Tab tablet Take 1 Tablet by mouth every day. 30 Tablet 1    ELIQUIS 5 MG Tab Take 1 Tablet by mouth 2 times a day. 180 Tablet 1    furosemide (LASIX) 20 MG Tab Take 1 Tablet by mouth every day. 90 Tablet 3    Coral Calcium 1000 (390 Ca) MG Tab Take 1,000 mg by mouth 2 times a day.      thyroid (ARMOUR THYROID) 120 MG Tab Take 1 Tablet by mouth every day. 30 Tablet 2    aspirin (ASA) 81 MG Chew Tab chewable tablet Chew 1 Tablet every day.      zinc sulfate (ZINCATE) 220 (50 Zn) MG Cap Take 50 mg by mouth every day.      ascorbic acid (VITAMIN C) 500 MG tablet Take 1 Tablet by mouth every day.      vitamin D3 (CHOLECALCIFEROL) 1000 Unit (25 mcg) Tab Take 1 Tablet by mouth every day.      FREESTYLE LITE strip USE AS DIRECTED TO CHECK BLOOD SUGAR ONCE DAILY      FreeStyle Lancets Misc Please dispense lancets for blood sugar checks 100 Each 3     No facility-administered encounter medications on file as of 6/27/2023.     Review of Systems   Constitutional: Negative.    HENT: Negative.     Eyes: Negative.    Respiratory:  Positive for shortness of breath.    Cardiovascular:  Negative for chest pain, palpitations, orthopnea, leg swelling and PND.   Gastrointestinal: Negative.    Genitourinary: Negative.    Musculoskeletal: Negative.    Skin: Negative.    Neurological: Negative.    Endo/Heme/Allergies: Negative.    Psychiatric/Behavioral:  Negative for depression. The patient is not nervous/anxious.               Objective     /76 (BP Location: Left arm, Patient Position: Sitting, BP Cuff Size: Adult)   Pulse 83   Resp 16   Ht 1.753 m (5' 9\")   " Wt 81.2 kg (179 lb)   SpO2 96%   BMI 26.43 kg/m²     Physical Exam  Constitutional:       Appearance: Normal appearance.   HENT:      Head: Normocephalic.   Neck:      Vascular: No JVD.   Cardiovascular:      Rate and Rhythm: Normal rate. Rhythm irregular.      Pulses: Normal pulses.      Heart sounds: Normal heart sounds. No murmur heard.     No friction rub.   Pulmonary:      Effort: Pulmonary effort is normal.      Breath sounds: Normal breath sounds.   Abdominal:      Palpations: Abdomen is soft.   Musculoskeletal:         General: Normal range of motion.      Right lower leg: No edema.      Left lower leg: No edema.   Skin:     General: Skin is warm and dry.   Neurological:      General: No focal deficit present.      Mental Status: He is alert and oriented to person, place, and time.   Psychiatric:         Mood and Affect: Mood normal.         Behavior: Behavior normal.            Lab Results   Component Value Date/Time    CHOLSTRLTOT 150 07/19/2022 11:58 AM    LDL 75 07/19/2022 11:58 AM    HDL 47 07/19/2022 11:58 AM    TRIGLYCERIDE 142 07/19/2022 11:58 AM       Lab Results   Component Value Date/Time    SODIUM 137 07/19/2022 11:58 AM    POTASSIUM 4.8 07/19/2022 11:58 AM    CHLORIDE 100 07/19/2022 11:58 AM    CO2 24 07/19/2022 11:58 AM    GLUCOSE 144 (H) 07/19/2022 11:58 AM    BUN 18 07/19/2022 11:58 AM    CREATININE 0.86 07/19/2022 11:58 AM     Lab Results   Component Value Date/Time    ALKPHOSPHAT 36 07/19/2022 11:58 AM    ASTSGOT 19 07/19/2022 11:58 AM    ALTSGPT 17 07/19/2022 11:58 AM    TBILIRUBIN 0.4 07/19/2022 11:58 AM      Echocardiogram 11/4/2022  CONCLUSIONS  Patient is in atrial fibrillation throughout the study.  Low normal left ventricular systolic function, visually estimated LVEF   of 50%.    Normal right ventricular systolic function.   Moderate left and mild right atrial enlargement.  Mild mitral regurgitation.  Mild tricuspid regurgitation.  Estimted RVSP of 32 mmHg+ RA pressure.  No prior  study is available for comparison.     Assessment & Plan     1. Other hyperlipidemia        2. Longstanding persistent atrial fibrillation (HCC)        3. Shortness of breath  EC-ECHOCARDIOGRAM COMPLETE W/O CONT          Medical Decision Making: Today's Assessment/Status/Plan:        Atrial fibrillation  -Continue Eliquis 5 mg twice daily  -DC digoxin, continue metoprolol 50 mg twice daily  -Patient will monitor his heart rate and let us know if heart rates are not well controlled on metoprolol alone    Shortness of breath  Ordered echocardiogram, and coronary calcium score    Follow-up with ANT Sweet in 3 months     This note was dictated using Dragon speech recognition software.

## 2023-07-12 ENCOUNTER — TELEPHONE (OUTPATIENT)
Dept: HEALTH INFORMATION MANAGEMENT | Facility: OTHER | Age: 83
End: 2023-07-12
Payer: MEDICARE

## 2023-08-02 PROBLEM — R94.30 NONSPECIFIC ABNORMAL FUNCTION STUDY, CARDIOVASCULAR: Status: ACTIVE | Noted: 2023-08-02

## 2023-08-03 PROBLEM — D68.69 SECONDARY HYPERCOAGULABLE STATE (HCC): Status: ACTIVE | Noted: 2023-08-03

## 2023-08-25 ENCOUNTER — PATIENT MESSAGE (OUTPATIENT)
Dept: CARDIOLOGY | Facility: MEDICAL CENTER | Age: 83
End: 2023-08-25
Payer: MEDICARE

## 2023-08-25 DIAGNOSIS — E11.9 TYPE 2 DIABETES MELLITUS WITHOUT COMPLICATION, UNSPECIFIED WHETHER LONG TERM INSULIN USE (HCC): ICD-10-CM

## 2023-08-25 DIAGNOSIS — I48.11 LONGSTANDING PERSISTENT ATRIAL FIBRILLATION (HCC): ICD-10-CM

## 2023-08-25 DIAGNOSIS — E03.9 HYPOTHYROIDISM, UNSPECIFIED TYPE: ICD-10-CM

## 2023-08-25 RX ORDER — FUROSEMIDE 20 MG/1
20 TABLET ORAL DAILY
Qty: 100 TABLET | Refills: 0 | Status: SHIPPED | OUTPATIENT
Start: 2023-08-25 | End: 2024-03-06

## 2023-08-25 NOTE — TELEPHONE ENCOUNTER
Labs required for refill. Courtesy refill send and lab orders placed. [employer] message sent to patient, awaiting patient response and will follow up as needed.

## 2023-08-29 ENCOUNTER — HOSPITAL ENCOUNTER (OUTPATIENT)
Dept: LAB | Facility: MEDICAL CENTER | Age: 83
End: 2023-08-29
Payer: MEDICARE

## 2023-08-29 DIAGNOSIS — I48.11 LONGSTANDING PERSISTENT ATRIAL FIBRILLATION (HCC): ICD-10-CM

## 2023-08-29 LAB
ANION GAP SERPL CALC-SCNC: 10 MMOL/L (ref 7–16)
BUN SERPL-MCNC: 16 MG/DL (ref 8–22)
CALCIUM SERPL-MCNC: 9.1 MG/DL (ref 8.5–10.5)
CHLORIDE SERPL-SCNC: 104 MMOL/L (ref 96–112)
CO2 SERPL-SCNC: 26 MMOL/L (ref 20–33)
CREAT SERPL-MCNC: 0.75 MG/DL (ref 0.5–1.4)
GFR SERPLBLD CREATININE-BSD FMLA CKD-EPI: 90 ML/MIN/1.73 M 2
GLUCOSE SERPL-MCNC: 157 MG/DL (ref 65–99)
MAGNESIUM SERPL-MCNC: 2 MG/DL (ref 1.5–2.5)
POTASSIUM SERPL-SCNC: 4.6 MMOL/L (ref 3.6–5.5)
SODIUM SERPL-SCNC: 140 MMOL/L (ref 135–145)

## 2023-08-29 PROCEDURE — 83735 ASSAY OF MAGNESIUM: CPT

## 2023-08-29 PROCEDURE — 80048 BASIC METABOLIC PNL TOTAL CA: CPT

## 2023-08-29 PROCEDURE — 36415 COLL VENOUS BLD VENIPUNCTURE: CPT

## 2023-09-01 ENCOUNTER — OFFICE VISIT (OUTPATIENT)
Dept: INTERNAL MEDICINE | Facility: OTHER | Age: 83
End: 2023-09-01
Payer: MEDICARE

## 2023-09-01 VITALS
SYSTOLIC BLOOD PRESSURE: 102 MMHG | HEIGHT: 70 IN | HEART RATE: 60 BPM | WEIGHT: 178.2 LBS | TEMPERATURE: 97.5 F | BODY MASS INDEX: 25.51 KG/M2 | DIASTOLIC BLOOD PRESSURE: 67 MMHG | OXYGEN SATURATION: 94 %

## 2023-09-01 DIAGNOSIS — I48.11 LONGSTANDING PERSISTENT ATRIAL FIBRILLATION (HCC): Chronic | ICD-10-CM

## 2023-09-01 DIAGNOSIS — E03.9 HYPOTHYROIDISM, UNSPECIFIED TYPE: Chronic | ICD-10-CM

## 2023-09-01 DIAGNOSIS — E11.9 TYPE 2 DIABETES MELLITUS WITHOUT COMPLICATION, UNSPECIFIED WHETHER LONG TERM INSULIN USE (HCC): Chronic | ICD-10-CM

## 2023-09-01 PROCEDURE — 3078F DIAST BP <80 MM HG: CPT

## 2023-09-01 PROCEDURE — 99213 OFFICE O/P EST LOW 20 MIN: CPT | Mod: GE

## 2023-09-01 PROCEDURE — 3074F SYST BP LT 130 MM HG: CPT

## 2023-09-01 ASSESSMENT — FIBROSIS 4 INDEX: FIB4 SCORE: 1.51

## 2023-09-01 ASSESSMENT — ENCOUNTER SYMPTOMS
FEVER: 0
NECK PAIN: 0
DEPRESSION: 0
SORE THROAT: 0
HEADACHES: 0
COUGH: 0
VOMITING: 0
BACK PAIN: 0
CONSTIPATION: 0
BLURRED VISION: 0
WEAKNESS: 0
DIARRHEA: 0
EYE DISCHARGE: 0
BRUISES/BLEEDS EASILY: 0
INSOMNIA: 0
HEARTBURN: 0
ORTHOPNEA: 0
BLOOD IN STOOL: 0
DOUBLE VISION: 0
ABDOMINAL PAIN: 0
SENSORY CHANGE: 0
DIZZINESS: 0
CLAUDICATION: 0
SHORTNESS OF BREATH: 0
WHEEZING: 0
NAUSEA: 0
CHILLS: 0
NERVOUS/ANXIOUS: 0
SPUTUM PRODUCTION: 0
PALPITATIONS: 0
WEIGHT LOSS: 0

## 2023-09-01 NOTE — PROGRESS NOTES
Subjective:     CC: Follow-up for A-fib and diabetes    HPI:   David presents today for follow-up on his A-fib and diabetes.  Past medical history significant for type 2 diabetes without complication, hypothyroid, hyperlipidemia, and longstanding persistent A-fib on anticoagulation.    For his diabetes, patient's last hemoglobin A1c on 6/7/2023 was 7.8%.  At the time, patient was switched from glipizide to Jardiance, currently continues on Jardiance and metformin.  Last hemoglobin A1c was less than 3 months ago, so no new reading available at this time. Patient also started exercise and golf, and is working on eating healthier.  Counseled patient that if his next hemoglobin A1c is lower, in conjunction with his exercise regimen, he may benefit from decreasing his antihyperglycemic regimen.  No other big changes to be made at this time.    For his A-fib, patient continues on Eliquis and metoprolol.  This is managed by cardiology.  Patient had questions about Watchman device.  Provided patient with information, but referred him to discuss these questions with his cardiologist if he has any further follow-up questions.      ROS:  Review of Systems   Constitutional:  Negative for chills, fever and weight loss.   HENT:  Negative for congestion, hearing loss and sore throat.    Eyes:  Negative for blurred vision, double vision and discharge.   Respiratory:  Negative for cough, sputum production, shortness of breath and wheezing.    Cardiovascular:  Negative for chest pain, palpitations, orthopnea, claudication and leg swelling.   Gastrointestinal:  Negative for abdominal pain, blood in stool, constipation, diarrhea, heartburn, melena, nausea and vomiting.   Genitourinary:  Negative for dysuria, frequency, hematuria and urgency.   Musculoskeletal:  Negative for back pain, joint pain and neck pain.   Skin:  Negative for rash.   Neurological:  Negative for dizziness, sensory change, weakness and headaches.  "  Endo/Heme/Allergies:  Negative for environmental allergies. Does not bruise/bleed easily.   Psychiatric/Behavioral:  Negative for depression. The patient is not nervous/anxious and does not have insomnia.        Objective:     Exam:  /67 (BP Location: Left arm, Patient Position: Sitting, BP Cuff Size: Adult)   Pulse 60   Temp 36.4 °C (97.5 °F) (Temporal)   Ht 1.765 m (5' 9.5\")   Wt 80.8 kg (178 lb 3.2 oz)   SpO2 94%   BMI 25.94 kg/m²  Body mass index is 25.94 kg/m².    Physical Exam  Constitutional:       General: He is not in acute distress.     Appearance: Normal appearance. He is normal weight. He is not ill-appearing.   HENT:      Head: Normocephalic and atraumatic.      Right Ear: External ear normal.      Left Ear: External ear normal.      Nose: Nose normal. No rhinorrhea.      Mouth/Throat:      Mouth: Mucous membranes are moist.      Pharynx: Oropharynx is clear.   Eyes:      General:         Right eye: No discharge.         Left eye: No discharge.      Extraocular Movements: Extraocular movements intact.      Pupils: Pupils are equal, round, and reactive to light.   Neck:      Vascular: No carotid bruit.   Cardiovascular:      Rate and Rhythm: Normal rate and regular rhythm.      Pulses: Normal pulses.      Heart sounds: Normal heart sounds. No murmur heard.     No friction rub.   Pulmonary:      Effort: Pulmonary effort is normal. No respiratory distress.      Breath sounds: Normal breath sounds. No wheezing or rhonchi.   Abdominal:      General: Abdomen is flat. Bowel sounds are normal.      Palpations: Abdomen is soft.      Tenderness: There is no abdominal tenderness. There is no right CVA tenderness or left CVA tenderness.      Hernia: No hernia is present.   Musculoskeletal:         General: No tenderness. Normal range of motion.      Cervical back: Normal range of motion. No rigidity.      Right lower leg: No edema.      Left lower leg: No edema.   Skin:     General: Skin is warm and " dry.      Capillary Refill: Capillary refill takes less than 2 seconds.      Findings: No lesion or rash.   Neurological:      General: No focal deficit present.      Mental Status: He is alert and oriented to person, place, and time. Mental status is at baseline.   Psychiatric:         Mood and Affect: Mood normal.         Behavior: Behavior normal.         Thought Content: Thought content normal.         Judgment: Judgment normal.       A chaperone was offered to the patient during today's exam.: Chaperone wife was present.    Labs:   Previous lab work and imaging discussed with the patient, no follow-up questions.    Assessment & Plan:     82 y.o. male with the following -     1. Type 2 diabetes mellitus without complication, unspecified whether long term insulin use (HCC)  For his diabetes, patient's last hemoglobin A1c on 6/7/2023 was 7.8%.  At the time, patient was switched from glipizide to Jardiance, currently continues on Jardiance and metformin.  Last hemoglobin A1c was less than 3 months ago, so no new reading available at this time. Patient also started exercise and golf, and is working on eating healthier.  Counseled patient that if his next hemoglobin A1c is lower, in conjunction with his exercise regimen, he may benefit from decreasing his antihyperglycemic regimen.  No other big changes to be made at this time.  - HEMOGLOBIN A1C; Future  - Microalbumin Creat Ratio Urine - Lab Collect; Future  - Referral to Ophthalmology  - Diabetic Monofilament Lower Extremity Exam    2. Longstanding persistent atrial fibrillation (HCC)  For his A-fib, patient continues on Eliquis and metoprolol.  This is managed by cardiology.  Patient had questions about Watchman device.  Provided patient with information, but referred him to discuss these questions with his cardiologist if he has any further follow-up questions.    3. Hypothyroidism, unspecified type  Past medical history of hypothyroidism, patient does not take  any prescribed medications for hypothyroidism.  Previous TSH last year within normal limits.  Patient takes herbal thyroid supplementation.  Will benefit from annual TSH testing going forward.  - TSH WITH REFLEX TO FT4; Future      I spent a total of 30 minutes with record review, exam, communication with the patient, communication with other providers, and documentation of this encounter.    Return in about 4 months (around 1/1/2024).    Please note that this dictation was created using voice recognition software. I have made every reasonable attempt to correct obvious errors, but I expect that there are errors of grammar and possibly content that I did not discover before finalizing the note.

## 2023-09-14 ENCOUNTER — HOSPITAL ENCOUNTER (OUTPATIENT)
Dept: LAB | Facility: MEDICAL CENTER | Age: 83
End: 2023-09-14
Payer: MEDICARE

## 2023-09-14 DIAGNOSIS — E03.9 HYPOTHYROIDISM, UNSPECIFIED TYPE: Chronic | ICD-10-CM

## 2023-09-14 DIAGNOSIS — E11.9 TYPE 2 DIABETES MELLITUS WITHOUT COMPLICATION, WITHOUT LONG-TERM CURRENT USE OF INSULIN (HCC): Chronic | ICD-10-CM

## 2023-09-14 LAB
ALBUMIN SERPL BCP-MCNC: 4.3 G/DL (ref 3.2–4.9)
ALBUMIN/GLOB SERPL: 1.4 G/DL
ALP SERPL-CCNC: 40 U/L (ref 30–99)
ALT SERPL-CCNC: 13 U/L (ref 2–50)
ANION GAP SERPL CALC-SCNC: 12 MMOL/L (ref 7–16)
AST SERPL-CCNC: 18 U/L (ref 12–45)
BILIRUB SERPL-MCNC: 0.4 MG/DL (ref 0.1–1.5)
BUN SERPL-MCNC: 19 MG/DL (ref 8–22)
CALCIUM ALBUM COR SERPL-MCNC: 9.4 MG/DL (ref 8.5–10.5)
CALCIUM SERPL-MCNC: 9.6 MG/DL (ref 8.5–10.5)
CHLORIDE SERPL-SCNC: 100 MMOL/L (ref 96–112)
CHOLEST SERPL-MCNC: 150 MG/DL (ref 100–199)
CO2 SERPL-SCNC: 25 MMOL/L (ref 20–33)
CREAT SERPL-MCNC: 0.81 MG/DL (ref 0.5–1.4)
CREAT UR-MCNC: 81.67 MG/DL
EST. AVERAGE GLUCOSE BLD GHB EST-MCNC: 194 MG/DL
FASTING STATUS PATIENT QL REPORTED: NORMAL
GFR SERPLBLD CREATININE-BSD FMLA CKD-EPI: 88 ML/MIN/1.73 M 2
GLOBULIN SER CALC-MCNC: 3 G/DL (ref 1.9–3.5)
GLUCOSE SERPL-MCNC: 185 MG/DL (ref 65–99)
HBA1C MFR BLD: 8.4 % (ref 4–5.6)
HDLC SERPL-MCNC: 48 MG/DL
LDLC SERPL CALC-MCNC: 82 MG/DL
MICROALBUMIN UR-MCNC: 1.2 MG/DL
MICROALBUMIN/CREAT UR: 15 MG/G (ref 0–30)
POTASSIUM SERPL-SCNC: 4.8 MMOL/L (ref 3.6–5.5)
PROT SERPL-MCNC: 7.3 G/DL (ref 6–8.2)
SODIUM SERPL-SCNC: 137 MMOL/L (ref 135–145)
TRIGL SERPL-MCNC: 102 MG/DL (ref 0–149)
TSH SERPL DL<=0.005 MIU/L-ACNC: 4.93 UIU/ML (ref 0.38–5.33)

## 2023-09-14 PROCEDURE — 84443 ASSAY THYROID STIM HORMONE: CPT

## 2023-09-14 PROCEDURE — 83036 HEMOGLOBIN GLYCOSYLATED A1C: CPT

## 2023-09-14 PROCEDURE — 36415 COLL VENOUS BLD VENIPUNCTURE: CPT

## 2023-09-14 PROCEDURE — 82043 UR ALBUMIN QUANTITATIVE: CPT

## 2023-09-14 PROCEDURE — 82570 ASSAY OF URINE CREATININE: CPT

## 2023-09-14 PROCEDURE — 80053 COMPREHEN METABOLIC PANEL: CPT

## 2023-09-14 PROCEDURE — 80061 LIPID PANEL: CPT

## 2023-10-02 ENCOUNTER — HOSPITAL ENCOUNTER (OUTPATIENT)
Dept: RADIOLOGY | Facility: MEDICAL CENTER | Age: 83
End: 2023-10-02
Payer: COMMERCIAL

## 2023-10-02 ENCOUNTER — PATIENT MESSAGE (OUTPATIENT)
Dept: CARDIOLOGY | Facility: MEDICAL CENTER | Age: 83
End: 2023-10-02
Payer: MEDICARE

## 2023-10-02 DIAGNOSIS — R06.02 SHORTNESS OF BREATH: ICD-10-CM

## 2023-10-02 PROCEDURE — 4410556 CT-CARDIAC SCORING (SELF PAY ONLY)

## 2023-10-03 DIAGNOSIS — I48.0 PAROXYSMAL ATRIAL FIBRILLATION (HCC): ICD-10-CM

## 2023-10-04 RX ORDER — METOPROLOL TARTRATE 50 MG/1
50 TABLET, FILM COATED ORAL 2 TIMES DAILY
Qty: 200 TABLET | Refills: 3 | Status: SHIPPED | OUTPATIENT
Start: 2023-10-04

## 2023-10-04 NOTE — TELEPHONE ENCOUNTER
Is the patient due for a refill? No    Was the patient seen the past year? Yes    Date of last office visit: 06/27/23    Does the patient have an upcoming appointment?  Yes   If yes, When? 10/25/23    Provider to refill:LH    Does the patients insurance require a 100 day supply?  Yes

## 2023-10-12 DIAGNOSIS — I48.11 LONGSTANDING PERSISTENT ATRIAL FIBRILLATION (HCC): ICD-10-CM

## 2023-10-12 DIAGNOSIS — E11.9 TYPE 2 DIABETES MELLITUS WITHOUT COMPLICATION, WITHOUT LONG-TERM CURRENT USE OF INSULIN (HCC): ICD-10-CM

## 2023-10-12 DIAGNOSIS — E11.9 TYPE 2 DIABETES MELLITUS WITHOUT COMPLICATION, UNSPECIFIED WHETHER LONG TERM INSULIN USE (HCC): ICD-10-CM

## 2023-10-18 ENCOUNTER — HOSPITAL ENCOUNTER (OUTPATIENT)
Dept: CARDIOLOGY | Facility: MEDICAL CENTER | Age: 83
End: 2023-10-18
Payer: MEDICARE

## 2023-10-18 DIAGNOSIS — R06.02 SHORTNESS OF BREATH: ICD-10-CM

## 2023-10-18 LAB
LV EJECT FRACT  99904: 55
LV EJECT FRACT MOD 2C 99903: 51.19
LV EJECT FRACT MOD 4C 99902: 54.66
LV EJECT FRACT MOD BP 99901: 52.78

## 2023-10-18 PROCEDURE — 93306 TTE W/DOPPLER COMPLETE: CPT | Mod: 26 | Performed by: INTERNAL MEDICINE

## 2023-10-18 PROCEDURE — 93306 TTE W/DOPPLER COMPLETE: CPT

## 2023-10-25 ENCOUNTER — OFFICE VISIT (OUTPATIENT)
Dept: CARDIOLOGY | Facility: MEDICAL CENTER | Age: 83
End: 2023-10-25
Payer: MEDICARE

## 2023-10-25 ENCOUNTER — TELEPHONE (OUTPATIENT)
Dept: CARDIOLOGY | Facility: MEDICAL CENTER | Age: 83
End: 2023-10-25

## 2023-10-25 VITALS
BODY MASS INDEX: 26.67 KG/M2 | HEIGHT: 68 IN | DIASTOLIC BLOOD PRESSURE: 78 MMHG | SYSTOLIC BLOOD PRESSURE: 114 MMHG | HEART RATE: 73 BPM | WEIGHT: 176 LBS | RESPIRATION RATE: 16 BRPM | OXYGEN SATURATION: 96 %

## 2023-10-25 DIAGNOSIS — D68.69 SECONDARY HYPERCOAGULABLE STATE (HCC): ICD-10-CM

## 2023-10-25 DIAGNOSIS — E78.00 PURE HYPERCHOLESTEROLEMIA: ICD-10-CM

## 2023-10-25 DIAGNOSIS — I48.11 LONGSTANDING PERSISTENT ATRIAL FIBRILLATION (HCC): Chronic | ICD-10-CM

## 2023-10-25 DIAGNOSIS — I07.1 SEVERE TRICUSPID REGURGITATION: ICD-10-CM

## 2023-10-25 DIAGNOSIS — R93.1 AGATSTON CORONARY ARTERY CALCIUM SCORE BETWEEN 100 AND 400: ICD-10-CM

## 2023-10-25 DIAGNOSIS — I34.0 MODERATE MITRAL REGURGITATION: ICD-10-CM

## 2023-10-25 PROBLEM — E78.49 OTHER HYPERLIPIDEMIA: Status: RESOLVED | Noted: 2023-06-27 | Resolved: 2023-10-25

## 2023-10-25 PROBLEM — R94.30 NONSPECIFIC ABNORMAL FUNCTION STUDY, CARDIOVASCULAR: Status: RESOLVED | Noted: 2023-08-02 | Resolved: 2023-10-25

## 2023-10-25 PROCEDURE — 99214 OFFICE O/P EST MOD 30 MIN: CPT

## 2023-10-25 PROCEDURE — 3074F SYST BP LT 130 MM HG: CPT

## 2023-10-25 PROCEDURE — 3078F DIAST BP <80 MM HG: CPT

## 2023-10-25 PROCEDURE — 99213 OFFICE O/P EST LOW 20 MIN: CPT

## 2023-10-25 RX ORDER — ROSUVASTATIN CALCIUM 20 MG/1
20 TABLET, COATED ORAL EVERY EVENING
Qty: 100 TABLET | Refills: 3 | Status: SHIPPED | OUTPATIENT
Start: 2023-10-25

## 2023-10-25 ASSESSMENT — ENCOUNTER SYMPTOMS
CONSTITUTIONAL NEGATIVE: 1
NEUROLOGICAL NEGATIVE: 1
PND: 0
EYES NEGATIVE: 1
NERVOUS/ANXIOUS: 0
SHORTNESS OF BREATH: 1
PALPITATIONS: 0
DEPRESSION: 0
ORTHOPNEA: 0
MUSCULOSKELETAL NEGATIVE: 1
GASTROINTESTINAL NEGATIVE: 1

## 2023-10-25 ASSESSMENT — FIBROSIS 4 INDEX: FIB4 SCORE: 1.66

## 2023-10-25 NOTE — TELEPHONE ENCOUNTER
Patient is scheduled on 11-2-23 for a RHC w/ . Patient was told to hold jardiance,lasix and metformin AM day of procedure. Patient to check in at 6:00 for a 7:30 procedure. H&P was done on 10-25-23 by Ute Chandler. Pre admit to call patient.

## 2023-10-25 NOTE — PROGRESS NOTES
No chief complaint on file.      Artis Owen is a 83 y.o. male who presents today for evaluation of his shortness of breath. They have a history of chronic atrial fibrillation, hyperlipidemia, hypothyroidism and diabetes     They are accompanied today by their wife     Seen by Dr. Nagy on 11/14/2022, at that visit he was clinically stable and no major changes     Seen by myself on 6/27/2023 since their last visit they have been feeling shortness of breath with activity class II symptoms, getting worse over the past few months.  I ordered an echocardiogram and CT calcium score.  Echocardiogram on 10/18/2023 showed severe tricuspid regurgitation and moderate mitral regurgitation.  Coronary calcium score was 194.6    Today 10/18/2023 some shortness of breath after a moderate amount of physical activity. No symptoms of chest pain, palpitations, or lower extremity edema.    No past medical history on file.  No past surgical history on file.  Family History   Problem Relation Age of Onset    Heart Attack Brother 38     Social History     Socioeconomic History    Marital status:      Spouse name: Not on file    Number of children: Not on file    Years of education: Not on file    Highest education level: Not on file   Occupational History    Not on file   Tobacco Use    Smoking status: Former    Smokeless tobacco: Never   Vaping Use    Vaping Use: Never used   Substance and Sexual Activity    Alcohol use: Not Currently    Drug use: Never    Sexual activity: Yes     Partners: Female   Other Topics Concern    Not on file   Social History Narrative    Not on file     Social Determinants of Health     Financial Resource Strain: Not on file   Food Insecurity: Not on file   Transportation Needs: Not on file   Physical Activity: Not on file   Stress: Not on file   Social Connections: Not on file   Intimate Partner Violence: Not on file   Housing Stability: Not on file     No Known  Allergies  Outpatient Encounter Medications as of 10/25/2023   Medication Sig Dispense Refill    Empagliflozin (JARDIANCE) 10 MG Tab tablet Take 1 Tablet by mouth every day. 90 Tablet 3    metoprolol tartrate (LOPRESSOR) 50 MG Tab Take 1 Tablet by mouth 2 times a day. 200 Tablet 3    furosemide (LASIX) 20 MG Tab Take 1 Tablet by mouth every day. 100 Tablet 0    ELIQUIS 5 MG Tab Take 1 Tablet by mouth 2 times a day. 180 Tablet 1    FREESTYLE LITE strip USE AS DIRECTED TO CHECK BLOOD SUGAR ONCE DAILY      metformin (GLUCOPHAGE) 1000 MG tablet Take 1 Tablet by mouth 2 times a day with meals. 200 Tablet 1    ferrous sulfate 325 (65 Fe) MG tablet Take 1 Tablet by mouth every day. Only taking 3 times a week 100 Tablet 1    simvastatin (ZOCOR) 20 MG Tab Take 1 Tablet by mouth every evening. 100 Tablet 1    FreeStyle Lancets Misc Please dispense lancets for blood sugar checks 100 Each 3    Coral Calcium 1000 (390 Ca) MG Tab Take 1,000 mg by mouth 2 times a day.      thyroid (ARMOUR THYROID) 120 MG Tab Take 1 Tablet by mouth every day. 30 Tablet 2    aspirin (ASA) 81 MG Chew Tab chewable tablet Chew 1 Tablet every day.      zinc sulfate (ZINCATE) 220 (50 Zn) MG Cap Take 50 mg by mouth every day.      ascorbic acid (VITAMIN C) 500 MG tablet Take 1 Tablet by mouth every day.      vitamin D3 (CHOLECALCIFEROL) 1000 Unit (25 mcg) Tab Take 1 Tablet by mouth every day.       No facility-administered encounter medications on file as of 10/25/2023.     Review of Systems   Constitutional: Negative.    HENT: Negative.     Eyes: Negative.    Respiratory:  Positive for shortness of breath.    Cardiovascular:  Negative for chest pain, palpitations, orthopnea, leg swelling and PND.   Gastrointestinal: Negative.    Genitourinary: Negative.    Musculoskeletal: Negative.    Skin: Negative.    Neurological: Negative.    Endo/Heme/Allergies: Negative.    Psychiatric/Behavioral:  Negative for depression. The patient is not nervous/anxious.                Objective     There were no vitals taken for this visit.    Physical Exam  Constitutional:       Appearance: Normal appearance.   HENT:      Head: Normocephalic.   Neck:      Vascular: No JVD.   Cardiovascular:      Rate and Rhythm: Normal rate. Rhythm irregular.      Pulses: Normal pulses.      Heart sounds: Normal heart sounds. No murmur heard.     No friction rub.   Pulmonary:      Effort: Pulmonary effort is normal.      Breath sounds: Normal breath sounds.   Abdominal:      Palpations: Abdomen is soft.   Musculoskeletal:         General: Normal range of motion.      Right lower leg: No edema.      Left lower leg: No edema.   Skin:     General: Skin is warm and dry.   Neurological:      General: No focal deficit present.      Mental Status: He is alert and oriented to person, place, and time.   Psychiatric:         Mood and Affect: Mood normal.         Behavior: Behavior normal.            Lab Results   Component Value Date/Time    CHOLSTRLTOT 150 09/14/2023 09:15 AM    LDL 82 09/14/2023 09:15 AM    HDL 48 09/14/2023 09:15 AM    TRIGLYCERIDE 102 09/14/2023 09:15 AM       Lab Results   Component Value Date/Time    SODIUM 137 09/14/2023 09:15 AM    POTASSIUM 4.8 09/14/2023 09:15 AM    CHLORIDE 100 09/14/2023 09:15 AM    CO2 25 09/14/2023 09:15 AM    GLUCOSE 185 (H) 09/14/2023 09:15 AM    BUN 19 09/14/2023 09:15 AM    CREATININE 0.81 09/14/2023 09:15 AM     Lab Results   Component Value Date/Time    ALKPHOSPHAT 40 09/14/2023 09:15 AM    ASTSGOT 18 09/14/2023 09:15 AM    ALTSGPT 13 09/14/2023 09:15 AM    TBILIRUBIN 0.4 09/14/2023 09:15 AM      Echocardiogram 11/4/2022  CONCLUSIONS  Patient is in atrial fibrillation throughout the study.  Low normal left ventricular systolic function, visually estimated LVEF   of 50%.    Normal right ventricular systolic function.   Moderate left and mild right atrial enlargement.  Mild mitral regurgitation.  Mild tricuspid regurgitation.  Estimted RVSP of 32 mmHg+ RA  pressure.  No prior study is available for comparison.     Coronary calcium score 10/2/2023  FINDINGS:  Coronary calcification:  LMA - 0.0  LCX - 77.4  LAD - 0.0  RCA - 117.2     Total Calcium Score: 194.6     Percentile: Calcium score is above the 25th percentile for the patient's age and sex.    Echocardiogram 10/18/2023  CONCLUSIONS  The right ventricle is dilated.  Reduced right ventricular systolic function.  Severe tricuspid regurgitation.  Moderate mitral regurgitation  Normal left ventricular systolic function.    Assessment & Plan     1. Severe tricuspid regurgitation        2. Moderate mitral regurgitation        3. Pure hypercholesterolemia        4. Longstanding persistent atrial fibrillation (HCC)        5. Secondary hypercoagulable state (HCC)        6. Agatston coronary artery calcium score between 100 and 400            Medical Decision Making: Today's Assessment/Status/Plan:        Atrial fibrillation  -Continue Eliquis 5 mg twice daily, DC aspirin  -continue metoprolol 50 mg twice daily  -Patient will monitor his heart rate and let us know if heart rates are not well controlled on metoprolol alone     Severe tricuspid regurgitation and moderate mitral regurgitation  -Noted on echocardiogram on 10/18/2023  -Ordered right heart catheterization, discussed the risks and benefits of right heart catheterization, patient is agreeable to proceed  -We discussed the possible plans of care depending on right heart cath results including possible referral to [facility] or referral to pulmonary for pulmonary hypertension management    Hyperlipidemia with positive coronary calcium score  -Most recent LDL 82  -Change simvastatin to rosuvastatin  -Goal of less than 70  -Check lipid panel in 3 months       Follow-up with ANT Sweet in 1 month after right heart cath     This note was dictated using Dragon speech recognition software.

## 2023-10-25 NOTE — TELEPHONE ENCOUNTER
----- Message from ELIZABETH Fernández sent at 10/25/2023 11:44 AM PDT -----  Regarding: [facility]  Alexi Dickens, I just ordered a right heart cath for this patient, thank you

## 2023-10-30 ENCOUNTER — APPOINTMENT (OUTPATIENT)
Dept: ADMISSIONS | Facility: MEDICAL CENTER | Age: 83
End: 2023-10-30
Attending: INTERNAL MEDICINE
Payer: MEDICARE

## 2023-11-01 ENCOUNTER — PRE-ADMISSION TESTING (OUTPATIENT)
Dept: ADMISSIONS | Facility: MEDICAL CENTER | Age: 83
End: 2023-11-01
Attending: INTERNAL MEDICINE
Payer: MEDICARE

## 2023-11-01 RX ORDER — LEVOTHYROXINE SODIUM 300 UG/1
300 TABLET ORAL NIGHTLY
COMMUNITY

## 2023-11-02 ENCOUNTER — HOSPITAL ENCOUNTER (OUTPATIENT)
Facility: MEDICAL CENTER | Age: 83
End: 2023-11-02
Attending: INTERNAL MEDICINE | Admitting: INTERNAL MEDICINE
Payer: MEDICARE

## 2023-11-02 ENCOUNTER — APPOINTMENT (OUTPATIENT)
Dept: CARDIOLOGY | Facility: MEDICAL CENTER | Age: 83
End: 2023-11-02
Payer: MEDICARE

## 2023-11-02 VITALS
OXYGEN SATURATION: 95 % | HEIGHT: 69 IN | BODY MASS INDEX: 25.73 KG/M2 | WEIGHT: 173.72 LBS | TEMPERATURE: 97.9 F | HEART RATE: 80 BPM | RESPIRATION RATE: 18 BRPM | SYSTOLIC BLOOD PRESSURE: 107 MMHG | DIASTOLIC BLOOD PRESSURE: 75 MMHG

## 2023-11-02 DIAGNOSIS — I07.1 SEVERE TRICUSPID REGURGITATION: ICD-10-CM

## 2023-11-02 LAB
ALBUMIN SERPL BCP-MCNC: 4.7 G/DL (ref 3.2–4.9)
ALBUMIN/GLOB SERPL: 1.4 G/DL
ALP SERPL-CCNC: 41 U/L (ref 30–99)
ALT SERPL-CCNC: 20 U/L (ref 2–50)
ANION GAP SERPL CALC-SCNC: 13 MMOL/L (ref 7–16)
APTT PPP: 40.6 SEC (ref 24.7–36)
AST SERPL-CCNC: 18 U/L (ref 12–45)
BILIRUB SERPL-MCNC: 0.4 MG/DL (ref 0.1–1.5)
BUN SERPL-MCNC: 26 MG/DL (ref 8–22)
CALCIUM ALBUM COR SERPL-MCNC: 9.4 MG/DL (ref 8.5–10.5)
CALCIUM SERPL-MCNC: 10 MG/DL (ref 8.5–10.5)
CALCULATED OXYGEN CONTENT: 11.4
CHLORIDE SERPL-SCNC: 101 MMOL/L (ref 96–112)
CO2 SERPL-SCNC: 26 MMOL/L (ref 20–33)
CREAT SERPL-MCNC: 0.95 MG/DL (ref 0.5–1.4)
EKG IMPRESSION: NORMAL
ERYTHROCYTE [DISTWIDTH] IN BLOOD BY AUTOMATED COUNT: 53.1 FL (ref 35.9–50)
GFR SERPLBLD CREATININE-BSD FMLA CKD-EPI: 80 ML/MIN/1.73 M 2
GLOBULIN SER CALC-MCNC: 3.3 G/DL (ref 1.9–3.5)
GLUCOSE BLD STRIP.AUTO-MCNC: 177 MG/DL (ref 65–99)
GLUCOSE SERPL-MCNC: 187 MG/DL (ref 65–99)
HCT VFR BLD AUTO: 44.9 % (ref 42–52)
HGB BLD-MCNC: 15.2 G/DL (ref 14–18)
INR PPP: 1.13 (ref 0.87–1.13)
MCH RBC QN AUTO: 31.8 PG (ref 27–33)
MCHC RBC AUTO-ENTMCNC: 33.9 G/DL (ref 32.3–36.5)
MCV RBC AUTO: 93.9 FL (ref 81.4–97.8)
OXYHEMOGLOBIN: 58.4
PLATELET # BLD AUTO: 202 K/UL (ref 164–446)
PMV BLD AUTO: 10.1 FL (ref 9–12.9)
POTASSIUM SERPL-SCNC: 4.2 MMOL/L (ref 3.6–5.5)
PROT SERPL-MCNC: 8 G/DL (ref 6–8.2)
PROTHROMBIN TIME: 14.6 SEC (ref 12–14.6)
RBC # BLD AUTO: 4.78 M/UL (ref 4.7–6.1)
SODIUM SERPL-SCNC: 140 MMOL/L (ref 135–145)
TOTAL HEMOGLOBIN: 14
WBC # BLD AUTO: 6.8 K/UL (ref 4.8–10.8)

## 2023-11-02 PROCEDURE — 85610 PROTHROMBIN TIME: CPT

## 2023-11-02 PROCEDURE — 160035 HCHG PACU - 1ST 60 MINS PHASE I

## 2023-11-02 PROCEDURE — 82962 GLUCOSE BLOOD TEST: CPT

## 2023-11-02 PROCEDURE — 700111 HCHG RX REV CODE 636 W/ 250 OVERRIDE (IP)

## 2023-11-02 PROCEDURE — 93005 ELECTROCARDIOGRAM TRACING: CPT | Performed by: INTERNAL MEDICINE

## 2023-11-02 PROCEDURE — 80053 COMPREHEN METABOLIC PANEL: CPT

## 2023-11-02 PROCEDURE — 700101 HCHG RX REV CODE 250

## 2023-11-02 PROCEDURE — 93010 ELECTROCARDIOGRAM REPORT: CPT | Performed by: INTERNAL MEDICINE

## 2023-11-02 PROCEDURE — 160002 HCHG RECOVERY MINUTES (STAT)

## 2023-11-02 PROCEDURE — 160046 HCHG PACU - 1ST 60 MINS PHASE II

## 2023-11-02 PROCEDURE — 99153 MOD SED SAME PHYS/QHP EA: CPT

## 2023-11-02 PROCEDURE — 93451 RIGHT HEART CATH: CPT | Mod: 26 | Performed by: INTERNAL MEDICINE

## 2023-11-02 PROCEDURE — 85730 THROMBOPLASTIN TIME PARTIAL: CPT

## 2023-11-02 PROCEDURE — 85018 HEMOGLOBIN: CPT | Performed by: INTERNAL MEDICINE

## 2023-11-02 PROCEDURE — 99152 MOD SED SAME PHYS/QHP 5/>YRS: CPT | Performed by: INTERNAL MEDICINE

## 2023-11-02 PROCEDURE — 85027 COMPLETE CBC AUTOMATED: CPT

## 2023-11-02 RX ORDER — MIDAZOLAM HYDROCHLORIDE 1 MG/ML
INJECTION INTRAMUSCULAR; INTRAVENOUS
Status: COMPLETED
Start: 2023-11-02 | End: 2023-11-02

## 2023-11-02 RX ORDER — LIDOCAINE HYDROCHLORIDE 20 MG/ML
INJECTION, SOLUTION INFILTRATION; PERINEURAL
Status: COMPLETED
Start: 2023-11-02 | End: 2023-11-02

## 2023-11-02 RX ORDER — HEPARIN SODIUM 200 [USP'U]/100ML
INJECTION, SOLUTION INTRAVENOUS
Status: COMPLETED
Start: 2023-11-02 | End: 2023-11-02

## 2023-11-02 RX ORDER — HEPARIN SODIUM 1000 [USP'U]/ML
INJECTION, SOLUTION INTRAVENOUS; SUBCUTANEOUS
Status: COMPLETED
Start: 2023-11-02 | End: 2023-11-02

## 2023-11-02 RX ORDER — SODIUM CHLORIDE 9 MG/ML
1000 INJECTION, SOLUTION INTRAVENOUS CONTINUOUS
Status: DISCONTINUED | OUTPATIENT
Start: 2023-11-02 | End: 2023-11-02

## 2023-11-02 RX ADMIN — LIDOCAINE HYDROCHLORIDE: 20 INJECTION, SOLUTION INFILTRATION; PERINEURAL at 08:20

## 2023-11-02 RX ADMIN — HEPARIN SODIUM: 1000 INJECTION, SOLUTION INTRAVENOUS; SUBCUTANEOUS at 08:20

## 2023-11-02 RX ADMIN — HEPARIN SODIUM 2000 UNITS: 200 INJECTION, SOLUTION INTRAVENOUS at 08:20

## 2023-11-02 RX ADMIN — MIDAZOLAM HYDROCHLORIDE 0.5 MG: 1 INJECTION, SOLUTION INTRAMUSCULAR; INTRAVENOUS at 08:42

## 2023-11-02 ASSESSMENT — PAIN DESCRIPTION - PAIN TYPE
TYPE: SURGICAL PAIN

## 2023-11-02 ASSESSMENT — FIBROSIS 4 INDEX
FIB4 SCORE: 1.66
FIB4 SCORE: 1.66

## 2023-11-02 NOTE — OR NURSING
0910 from OR to PACU 3. Connected to monitor. Report received from anesthesia & RN. VSS.  Breaths calm, even, unlabored.  Right AC site wrapped with coban- clean, dry, and intact. Palpable radial pulses with capillary refill < 2 seconds.     0919: Wife brought back into recovery room.    0940: Removed top coban layer from dressing to right arm. Dressing underneath remains clean, dry, and intact.     1000: Discharge instructions reviewed with family member. All questions answered, verbalizes understanding.     1015: IV and ID bands removed, assisted to change into own clothing. All personal belongings & discharge instructions with patient.    1023: Escorted to car via wheelchair, accompanied by CNA and family.

## 2023-11-02 NOTE — PROCEDURES
Cardiac Catheterization Procedure Note    DATE: 11/2/2023    : Drake Ziegler MD    PROCEDURES PERFORMED:  Right heart catheterization  Moderate conscious sedation    INDICATIONS:  The patient is an 83-year-old gentleman referred for right heart catheterization to evaluate possible pulmonary hypertension by echocardiography.    CONSENT:  The complete alternatives, risks, and benefits of the procedure were explained to the patient. Signed informed consent was obtained and placed in the chart prior to the procedure.  A timeout was performed prior to beginning procedure.    MEDICATIONS:  Lidocaine  Fentanyl  Midazolam    MODERATE CONSCIOUS SEDATION:  I personally observed the administration of moderate conscious sedation by the nursing staff for 24 minutes.  Sedation start time: 8:18 AM  Sedation end time: 8:42 AM    CONTRAST: None    ACCESS: 6-Palestinian Glidesheath in the right brachial vein.    ESTIMATED BLOOD LOSS: 5 cc    COMPLICATIONS: None    PROCEDURE IN DETAIL:  The patient was brought to the cardiac catheterization laboratory in the fasting state.  Right brachial vein access was obtained by the cath lab staff using an IV catheter and sterile technique.  The IV catheter was then exchanged over a micro wire for a 6-Palestinian Glidesheath.  A 6-Palestinian balloon-tipped pulmonary artery catheter was then advanced into the right subclavian vein where the balloon was inflated.  The catheter was then advanced into the right atrium, right ventricle, pulmonary artery, and wedge position with sequential pressures measured.  The balloon was deflated and pulmonary artery saturation was obtained for estimation of cardiac output by the Michelle method.  Cardiac output was then estimated using the thermodilution method.  Following completion of right heart catheterization, all catheters and sheaths were removed.  Manual pressure was applied for hemostasis.    Mean right atrial pressure: 4 mmHg  Right ventricular pressure: 30/8  mmHg  Pulmonary artery pressure: 32/16/22 mmHg  Mean pulmonary capillary wedge pressure: 9 mmHg  Pulmonary artery saturation: 58%  Systemic arterial saturation: 92%  Michelle cardiac output: 3.4 L/min  Michelle cardiac index: 1.8 L/min/m2  Thermodilution cardiac output: 3.7 L/min  Thermodilution cardiac index: 1.9 L/min/m2  Pulmonary vascular resistance: 3.5 Wood units    IMPRESSION:  Normal right heart filling pressures.  Normal left heart filling pressures.  Mildly elevated pulmonary pressures.  Moderately reduced cardiac output.    RECOMMENDATIONS:  Recover in post procedure area.  Continue evaluation per primary service.

## 2023-11-02 NOTE — DISCHARGE INSTRUCTIONS
Please continue taking eliquis until you follow up with Dr. Amado.    Radial Catherization Discharge Instructions      Do not drive a car for 24 hours  You may remove the dressing tomorrow  You may shower on the day following your procedure.    Follow up with Dr. Amado  Increase fluids for 2 days post procedure.  Continue all previous medications unless otherwise instructed.    If bleeding should occur following discharge:  Sit down and apply firm pressure to site with your fingers for 10 minutes  If the bleeding stops, continue to sit quietly, keeping your wrist straight for 2 hours.  Notify physician as soon as possible ( [phone])  If bleeding does not stop after 10 minutes, or if there is a large amount of bleeding or spurting, call 911 immediately.  Do not drive yourself to the hospital.      What to Expect Post Anesthesia    Rest and take it easy for the first 24 hours.  A responsible adult is recommended to remain with you during that time.  It is normal to feel sleepy.  We encourage you to not do anything that requires balance, judgment or coordination.    FOR 24 HOURS DO NOT:  Drive, operate machinery or run household appliances.  Drink beer or alcoholic beverages.  Make important decisions or sign legal documents.    To avoid nausea, slowly advance diet as tolerated, avoiding spicy or greasy foods for the first day.  Add more substantial food to your diet according to your provider's instructions.  Babies can be fed formula or breast milk as soon as they are hungry.  INCREASE FLUIDS AND FIBER TO AVOID CONSTIPATION.    MILD FLU-LIKE SYMPTOMS ARE NORMAL.  YOU MAY EXPERIENCE GENERALIZED MUSCLE ACHES, THROAT IRRITATION, HEADACHE AND/OR SOME NAUSEA.

## 2023-11-07 ENCOUNTER — TELEPHONE (OUTPATIENT)
Dept: CARDIOLOGY | Facility: MEDICAL CENTER | Age: 83
End: 2023-11-07
Payer: MEDICARE

## 2023-11-07 DIAGNOSIS — I07.1 SEVERE TRICUSPID REGURGITATION: ICD-10-CM

## 2023-11-07 NOTE — TELEPHONE ENCOUNTER
Called patient and spoke with him about the results of his right heart catheterization.  Discussed Dr. Antonio's recommendation that patient be referred to [facility] for possible tricuspid clip.  Referral placed.  Answered patient's questions to the best of my ability.

## 2023-11-29 ENCOUNTER — OFFICE VISIT (OUTPATIENT)
Dept: CARDIOLOGY | Facility: MEDICAL CENTER | Age: 83
End: 2023-11-29
Payer: MEDICARE

## 2023-11-29 VITALS
WEIGHT: 178 LBS | SYSTOLIC BLOOD PRESSURE: 114 MMHG | HEART RATE: 74 BPM | BODY MASS INDEX: 26.36 KG/M2 | DIASTOLIC BLOOD PRESSURE: 64 MMHG | OXYGEN SATURATION: 93 % | HEIGHT: 69 IN | RESPIRATION RATE: 14 BRPM

## 2023-11-29 DIAGNOSIS — R93.1 AGATSTON CORONARY ARTERY CALCIUM SCORE BETWEEN 100 AND 400: ICD-10-CM

## 2023-11-29 DIAGNOSIS — D68.69 SECONDARY HYPERCOAGULABLE STATE (HCC): ICD-10-CM

## 2023-11-29 DIAGNOSIS — I34.0 MODERATE MITRAL REGURGITATION: ICD-10-CM

## 2023-11-29 DIAGNOSIS — I48.11 LONGSTANDING PERSISTENT ATRIAL FIBRILLATION (HCC): Chronic | ICD-10-CM

## 2023-11-29 DIAGNOSIS — E78.00 PURE HYPERCHOLESTEROLEMIA: ICD-10-CM

## 2023-11-29 DIAGNOSIS — I07.1 SEVERE TRICUSPID REGURGITATION: ICD-10-CM

## 2023-11-29 PROCEDURE — 99214 OFFICE O/P EST MOD 30 MIN: CPT

## 2023-11-29 PROCEDURE — 3074F SYST BP LT 130 MM HG: CPT

## 2023-11-29 PROCEDURE — 3078F DIAST BP <80 MM HG: CPT

## 2023-11-29 ASSESSMENT — ENCOUNTER SYMPTOMS
MUSCULOSKELETAL NEGATIVE: 1
SHORTNESS OF BREATH: 1
PND: 0
GASTROINTESTINAL NEGATIVE: 1
PALPITATIONS: 0
DEPRESSION: 0
EYES NEGATIVE: 1
NERVOUS/ANXIOUS: 0
NEUROLOGICAL NEGATIVE: 1
ORTHOPNEA: 0

## 2023-11-29 ASSESSMENT — FIBROSIS 4 INDEX: FIB4 SCORE: 1.63

## 2023-11-29 NOTE — PROGRESS NOTES
Chief Complaint   Patient presents with    Other     Severe tricuspid regurgitation       Subjective     David Owen is a 83 y.o. male who presents today for follow up of his RHC. They have a history of chronic atrial fibrillation, hyperlipidemia, hypothyroidism and diabetes     They are accompanied today by their wife Karly and daughter rtiu     Seen by Dr. Nagy on 11/14/2022, at that visit he was clinically stable and no major changes     Seen by myself on 6/27/2023 since their last visit they have been feeling shortness of breath with activity class II symptoms, getting worse over the past few months.  I ordered an echocardiogram and CT calcium score.  Echocardiogram on 10/18/2023 showed severe tricuspid regurgitation and moderate mitral regurgitation.  Coronary calcium score was 194.6     Visit with myself on 10/18/2023 some shortness of breath after a moderate amount of physical activity. No symptoms of chest pain, palpitations, or lower extremity edema.  Ordered a right heart cath for him    Today 11/29/2023 he underwent right heart catheterization on 11/2/2023 which showed normal pulmonary artery pressures.  Referral was placed to [facility] for management of his TR. One episode of HR in the low 100s, otherwise A-fib has been well-controlled.  Unfortunately patient has hit some roadblocks with insurance approval of him going to [facility].  He does report ongoing shortness of breath with activity, some mild lower extremity edema, but denies chest pain or palpitations.       Past Medical History:   Diagnosis Date    Arrhythmia 11/01/2023    a fib    Arthritis 11/01/2023    hands    Cataract 2022    bilateral IOL    Diabetes (HCC) 2023    Disorder of thyroid     High cholesterol     Hypertension      Past Surgical History:   Procedure Laterality Date    OTHER  2022    IOL right    OTHER  2020    kidney stones/stent    OTHER CARDIAC SURGERY  2020    angiogram    OTHER  2017    tubes in ears bilat.     OTHER ORTHOPEDIC SURGERY  2007    rt. shoulder rotator cuff repair    OTHER ORTHOPEDIC SURGERY  1980    rt. knee arthroscopy    OTHER ORTHOPEDIC SURGERY  1977    lt shoulder repair    OTHER ORTHOPEDIC SURGERY  1973    right hand tendon repair    OTHER ABDOMINAL SURGERY  1955    appy    OTHER ORTHOPEDIC SURGERY  1954    left index finger re-attached    OTHER      tonsills as child    OTHER ORTHOPEDIC SURGERY      left tendon repair hand     Family History   Problem Relation Age of Onset    Heart Attack Brother 38     Social History     Socioeconomic History    Marital status:      Spouse name: Not on file    Number of children: Not on file    Years of education: Not on file    Highest education level: Not on file   Occupational History    Not on file   Tobacco Use    Smoking status: Former    Smokeless tobacco: Never   Vaping Use    Vaping Use: Never used   Substance and Sexual Activity    Alcohol use: Not Currently    Drug use: Never    Sexual activity: Yes     Partners: Female   Other Topics Concern    Not on file   Social History Narrative    Not on file     Social Determinants of Health     Financial Resource Strain: Not on file   Food Insecurity: Not on file   Transportation Needs: Not on file   Physical Activity: Not on file   Stress: Not on file   Social Connections: Not on file   Intimate Partner Violence: Not on file   Housing Stability: Not on file     No Known Allergies  Outpatient Encounter Medications as of 11/29/2023   Medication Sig Dispense Refill    JARDIANCE 10 MG Tab tablet TAKE 1 TABLET BY MOUTH EVERY DAY 90 Tablet 1    levothyroxine (SYNTHROID) 300 MCG Tab Take 300 mcg by mouth every evening.      Non Formulary Request Joint supplement 2 tabs BID po.      rosuvastatin (CRESTOR) 20 MG Tab Take 1 Tablet by mouth every evening. 100 Tablet 3    metoprolol tartrate (LOPRESSOR) 50 MG Tab Take 1 Tablet by mouth 2 times a day. 200 Tablet 3    furosemide (LASIX) 20 MG Tab Take 1 Tablet by mouth  "every day. 100 Tablet 0    ELIQUIS 5 MG Tab Take 1 Tablet by mouth 2 times a day. 180 Tablet 1    FREESTYLE LITE strip USE AS DIRECTED TO CHECK BLOOD SUGAR ONCE DAILY      metformin (GLUCOPHAGE) 1000 MG tablet Take 1 Tablet by mouth 2 times a day with meals. 200 Tablet 1    ferrous sulfate 325 (65 Fe) MG tablet Take 1 Tablet by mouth every day. Only taking 3 times a week (Patient taking differently: Take 325 mg by mouth two times a week. Only taking 3 times a week) 100 Tablet 1    FreeStyle Lancets Misc Please dispense lancets for blood sugar checks 100 Each 3    Coral Calcium 1000 (390 Ca) MG Tab Take 1,000 mg by mouth 2 times a day.      thyroid (ARMOUR THYROID) 120 MG Tab Take 1 Tablet by mouth every day. 30 Tablet 2    zinc sulfate (ZINCATE) 220 (50 Zn) MG Cap Take 50 mg by mouth every evening.      ascorbic acid (VITAMIN C) 500 MG tablet Take 500 mg by mouth 2 times a day.      vitamin D3 (CHOLECALCIFEROL) 1000 Unit (25 mcg) Tab Take 5,000 Units by mouth 2 (two) times a day.       No facility-administered encounter medications on file as of 11/29/2023.     Review of Systems   Constitutional:  Positive for malaise/fatigue.   HENT: Negative.     Eyes: Negative.    Respiratory:  Positive for shortness of breath.    Cardiovascular:  Positive for leg swelling. Negative for chest pain, palpitations, orthopnea and PND.   Gastrointestinal: Negative.    Genitourinary: Negative.    Musculoskeletal: Negative.    Skin: Negative.    Neurological: Negative.    Endo/Heme/Allergies: Negative.    Psychiatric/Behavioral:  Negative for depression. The patient is not nervous/anxious.               Objective     /64 (BP Location: Left arm, Patient Position: Sitting, BP Cuff Size: Adult)   Pulse 74   Resp 14   Ht 1.753 m (5' 9\")   Wt 80.7 kg (178 lb)   SpO2 93%   BMI 26.29 kg/m²     Physical Exam       Lab Results   Component Value Date/Time    CHOLSTRLTOT 150 09/14/2023 09:15 AM    LDL 82 09/14/2023 09:15 AM    HDL 48 " 09/14/2023 09:15 AM    TRIGLYCERIDE 102 09/14/2023 09:15 AM       Lab Results   Component Value Date/Time    SODIUM 140 11/02/2023 06:35 AM    POTASSIUM 4.2 11/02/2023 06:35 AM    CHLORIDE 101 11/02/2023 06:35 AM    CO2 26 11/02/2023 06:35 AM    GLUCOSE 187 (H) 11/02/2023 06:35 AM    BUN 26 (H) 11/02/2023 06:35 AM    CREATININE 0.95 11/02/2023 06:35 AM     Lab Results   Component Value Date/Time    ALKPHOSPHAT 41 11/02/2023 06:35 AM    ASTSGOT 18 11/02/2023 06:35 AM    ALTSGPT 20 11/02/2023 06:35 AM    TBILIRUBIN 0.4 11/02/2023 06:35 AM      Right heart catheterization 11/2/2023  Mean right atrial pressure: 4 mmHg  Right ventricular pressure: 30/8 mmHg  Pulmonary artery pressure: 32/16/22 mmHg  Mean pulmonary capillary wedge pressure: 9 mmHg  Pulmonary artery saturation: 58%  Systemic arterial saturation: 92%  Michelle cardiac output: 3.4 L/min  Michelle cardiac index: 1.8 L/min/m2  Thermodilution cardiac output: 3.7 L/min  Thermodilution cardiac index: 1.9 L/min/m2  Pulmonary vascular resistance: 3.5 Wood units     IMPRESSION:  Normal right heart filling pressures.  Normal left heart filling pressures.  Mildly elevated pulmonary pressures.  Moderately reduced cardiac output.    Assessment & Plan     1. Longstanding persistent atrial fibrillation (HCC)        2. Severe tricuspid regurgitation        3. Agatston coronary artery calcium score between 100 and 400        4. Moderate mitral regurgitation        5. Pure hypercholesterolemia        6. Secondary hypercoagulable state (HCC)            Medical Decision Making: Today's Assessment/Status/Plan:        Atrial fibrillation  -Continue Eliquis 5 mg twice daily  -continue metoprolol 50 mg twice daily  -Patient will monitor his heart rate and let us know if heart rates are not well controlled on metoprolol alone     Severe tricuspid regurgitation and moderate mitral regurgitation  -Noted on echocardiogram on 10/18/2023  -Right heart catheterization showed normal pulmonary  pressures  -After his right heart cath I have placed referral to [facility] for tricuspid clip procedure  -Patient does not want to undergo open heart surgery at this time although I discussed with him that this may be an option he will want to consider in the future depending on the difficulty that we have with getting insurance approval  -For now I will reach out to [facility] and see if there is anything that we can do to get approval for the tricuspid clip     Hyperlipidemia with positive coronary calcium score  -Most recent LDL 82  -Change simvastatin to rosuvastatin  -Goal of less than 70  -Check lipid panel in 3 months        Follow-up with ANT Sweet in 1 month     This note was dictated using Dragon speech recognition software.

## 2024-01-03 ENCOUNTER — OFFICE VISIT (OUTPATIENT)
Dept: CARDIOLOGY | Facility: MEDICAL CENTER | Age: 84
End: 2024-01-03
Payer: MEDICARE

## 2024-01-03 VITALS
HEIGHT: 69 IN | SYSTOLIC BLOOD PRESSURE: 108 MMHG | BODY MASS INDEX: 25.53 KG/M2 | OXYGEN SATURATION: 96 % | HEART RATE: 66 BPM | DIASTOLIC BLOOD PRESSURE: 72 MMHG | WEIGHT: 172.4 LBS

## 2024-01-03 DIAGNOSIS — R93.1 AGATSTON CORONARY ARTERY CALCIUM SCORE BETWEEN 100 AND 400: ICD-10-CM

## 2024-01-03 DIAGNOSIS — I48.11 LONGSTANDING PERSISTENT ATRIAL FIBRILLATION (HCC): Chronic | ICD-10-CM

## 2024-01-03 DIAGNOSIS — I34.0 MODERATE MITRAL REGURGITATION: ICD-10-CM

## 2024-01-03 DIAGNOSIS — I07.1 SEVERE TRICUSPID REGURGITATION: ICD-10-CM

## 2024-01-03 DIAGNOSIS — D68.69 SECONDARY HYPERCOAGULABLE STATE (HCC): ICD-10-CM

## 2024-01-03 DIAGNOSIS — E78.00 PURE HYPERCHOLESTEROLEMIA: ICD-10-CM

## 2024-01-03 PROCEDURE — 99213 OFFICE O/P EST LOW 20 MIN: CPT

## 2024-01-03 PROCEDURE — 99214 OFFICE O/P EST MOD 30 MIN: CPT

## 2024-01-03 PROCEDURE — 3074F SYST BP LT 130 MM HG: CPT

## 2024-01-03 PROCEDURE — 3078F DIAST BP <80 MM HG: CPT

## 2024-01-03 ASSESSMENT — FIBROSIS 4 INDEX: FIB4 SCORE: 1.63

## 2024-01-03 NOTE — PROGRESS NOTES
Chief Complaint   Patient presents with    Atrial Fibrillation     Dx: Longstanding persistent atrial fibrillation (HCC)    Hyperlipidemia       Subjective     David Sravan Owen is a 83 y.o. male who presents today for 1 month follow up. They have a history of chronic atrial fibrillation, hyperlipidemia, hypothyroidism and diabetes     They are accompanied today by their wife Karly     Seen by myself on 6/27/2023 since their last visit they have been feeling shortness of breath with activity class II symptoms, getting worse over the past few months.  I ordered an echocardiogram and CT calcium score.  Echocardiogram on 10/18/2023 showed severe tricuspid regurgitation and moderate mitral regurgitation.  Coronary calcium score was 194.6     Visit with myself on 10/18/2023 some shortness of breath after a moderate amount of physical activity. No symptoms of chest pain, palpitations, or lower extremity edema.  Ordered a right heart cath for him     At my visit with him on 11/29/2023 he underwent right heart catheterization on 11/2/2023 which showed normal pulmonary artery pressures.  Referral was placed to [facility] for management of his TR. One episode of HR in the low 100s, otherwise A-fib has been well-controlled.  Unfortunately patient has hit some roadblocks with insurance approval of him going to [facility].  He does report ongoing shortness of breath with activity, some mild lower extremity edema, but denies chest pain or palpitations.    Today 1/3/2024 he is able to walk about 1 mile a day on the treadmill, overall he is feeling really stable.  He does still have occasional shortness of breath, denies lower extremity edema at this time, no chest pain or palpitations.        Past Medical History:   Diagnosis Date    Arrhythmia 11/01/2023    a fib    Arthritis 11/01/2023    hands    Cataract 2022    bilateral IOL    Diabetes (HCC) 2023    Disorder of thyroid     High cholesterol     Hypertension      Past  Surgical History:   Procedure Laterality Date    OTHER  2022    IOL right    OTHER  2020    kidney stones/stent    OTHER CARDIAC SURGERY  2020    angiogram    OTHER  2017    tubes in ears bilat.    OTHER ORTHOPEDIC SURGERY  2007    rt. shoulder rotator cuff repair    OTHER ORTHOPEDIC SURGERY  1980    rt. knee arthroscopy    OTHER ORTHOPEDIC SURGERY  1977    lt shoulder repair    OTHER ORTHOPEDIC SURGERY  1973    right hand tendon repair    OTHER ABDOMINAL SURGERY  1955    appy    OTHER ORTHOPEDIC SURGERY  1954    left index finger re-attached    OTHER      tonsills as child    OTHER ORTHOPEDIC SURGERY      left tendon repair hand     Family History   Problem Relation Age of Onset    Heart Attack Brother 38     Social History     Socioeconomic History    Marital status:      Spouse name: Not on file    Number of children: Not on file    Years of education: Not on file    Highest education level: Not on file   Occupational History    Not on file   Tobacco Use    Smoking status: Former    Smokeless tobacco: Never   Vaping Use    Vaping Use: Never used   Substance and Sexual Activity    Alcohol use: Not Currently    Drug use: Never    Sexual activity: Yes     Partners: Female   Other Topics Concern    Not on file   Social History Narrative    Not on file     Social Determinants of Health     Financial Resource Strain: Not on file   Food Insecurity: Not on file   Transportation Needs: Not on file   Physical Activity: Not on file   Stress: Not on file   Social Connections: Not on file   Intimate Partner Violence: Not on file   Housing Stability: Not on file     No Known Allergies  Outpatient Encounter Medications as of 1/3/2024   Medication Sig Dispense Refill    JARDIANCE 10 MG Tab tablet TAKE 1 TABLET BY MOUTH EVERY DAY 90 Tablet 1    levothyroxine (SYNTHROID) 300 MCG Tab Take 300 mcg by mouth every evening.      Non Formulary Request Joint supplement 2 tabs BID po.      rosuvastatin (CRESTOR) 20 MG Tab Take 1  "Tablet by mouth every evening. 100 Tablet 3    metoprolol tartrate (LOPRESSOR) 50 MG Tab Take 1 Tablet by mouth 2 times a day. 200 Tablet 3    furosemide (LASIX) 20 MG Tab Take 1 Tablet by mouth every day. 100 Tablet 0    ELIQUIS 5 MG Tab Take 1 Tablet by mouth 2 times a day. 180 Tablet 1    FREESTYLE LITE strip USE AS DIRECTED TO CHECK BLOOD SUGAR ONCE DAILY      metformin (GLUCOPHAGE) 1000 MG tablet Take 1 Tablet by mouth 2 times a day with meals. 200 Tablet 1    ferrous sulfate 325 (65 Fe) MG tablet Take 1 Tablet by mouth every day. Only taking 3 times a week (Patient taking differently: Take 325 mg by mouth two times a week. Only taking 3 times a week) 100 Tablet 1    FreeStyle Lancets Misc Please dispense lancets for blood sugar checks 100 Each 3    Coral Calcium 1000 (390 Ca) MG Tab Take 1,000 mg by mouth 2 times a day.      thyroid (ARMOUR THYROID) 120 MG Tab Take 1 Tablet by mouth every day. 30 Tablet 2    zinc sulfate (ZINCATE) 220 (50 Zn) MG Cap Take 50 mg by mouth every evening.      ascorbic acid (VITAMIN C) 500 MG tablet Take 500 mg by mouth 2 times a day.      vitamin D3 (CHOLECALCIFEROL) 1000 Unit (25 mcg) Tab Take 5,000 Units by mouth 2 (two) times a day.       No facility-administered encounter medications on file as of 1/3/2024.     ROS           Objective     /72 (BP Location: Left arm, Patient Position: Sitting, BP Cuff Size: Adult)   Pulse 66   Ht 1.753 m (5' 9\")   Wt 78.2 kg (172 lb 6.4 oz)   SpO2 96%   BMI 25.46 kg/m²     Physical Exam           Assessment & Plan     1. Longstanding persistent atrial fibrillation (HCC)        2. Severe tricuspid regurgitation        3. Agatston coronary artery calcium score between 100 and 400        4. Moderate mitral regurgitation        5. Pure hypercholesterolemia        6. Secondary hypercoagulable state (HCC)            Medical Decision Making: Today's Assessment/Status/Plan:        HCC Gap Form    Diagnosis to address: I48.11 - Longstanding " persistent atrial fibrillation (HCC)  Assessment and plan: Chronic, stable. Continue with current defined treatment plan: Continue Eliquis, metoprolol. Follow-up at least annually.  Last edited 01/03/24 15:36 PST by KARY Fernández.           Atrial fibrillation  -Continue Eliquis 5 mg twice daily  -continue metoprolol 50 mg twice daily  -Patient will monitor his heart rate and let us know if heart rates are not well controlled on metoprolol alone     Severe tricuspid regurgitation and moderate mitral regurgitation  -Noted on echocardiogram on 10/18/2023  -Right heart catheterization showed normal pulmonary pressures  -After his right heart cath I have placed referral to [facility] for tricuspid clip procedure, we are awaiting insurance approval for him to undergo this procedure at an out of [facility] hospital  -Patient does not want to undergo open heart surgery at this time although I discussed with him that this may be an option he will want to consider in the future depending on the difficulty that we have with getting insurance approval     Hyperlipidemia with positive coronary calcium score  -Most recent LDL 82  -Change simvastatin to rosuvastatin  -Goal of less than 70  -Check lipid panel         Follow-up with ANT Sweet in 3 months     This note was dictated using Dragon speech recognition software.

## 2024-01-05 ENCOUNTER — OFFICE VISIT (OUTPATIENT)
Dept: INTERNAL MEDICINE | Facility: OTHER | Age: 84
End: 2024-01-05
Payer: MEDICARE

## 2024-01-05 VITALS
HEIGHT: 70 IN | HEART RATE: 77 BPM | TEMPERATURE: 97.3 F | BODY MASS INDEX: 25 KG/M2 | DIASTOLIC BLOOD PRESSURE: 72 MMHG | WEIGHT: 174.6 LBS | SYSTOLIC BLOOD PRESSURE: 125 MMHG | OXYGEN SATURATION: 98 %

## 2024-01-05 DIAGNOSIS — I48.11 LONGSTANDING PERSISTENT ATRIAL FIBRILLATION (HCC): Chronic | ICD-10-CM

## 2024-01-05 DIAGNOSIS — B35.1 TOENAIL FUNGUS: ICD-10-CM

## 2024-01-05 DIAGNOSIS — I07.1 SEVERE TRICUSPID REGURGITATION: ICD-10-CM

## 2024-01-05 DIAGNOSIS — D64.9 ANEMIA, UNSPECIFIED TYPE: ICD-10-CM

## 2024-01-05 DIAGNOSIS — E11.9 TYPE 2 DIABETES MELLITUS WITHOUT COMPLICATION, UNSPECIFIED WHETHER LONG TERM INSULIN USE (HCC): ICD-10-CM

## 2024-01-05 DIAGNOSIS — E11.9 TYPE 2 DIABETES MELLITUS WITHOUT COMPLICATION (HCC): ICD-10-CM

## 2024-01-05 PROBLEM — D68.69 SECONDARY HYPERCOAGULABLE STATE (HCC): Status: RESOLVED | Noted: 2023-08-03 | Resolved: 2024-01-05

## 2024-01-05 LAB
HBA1C MFR BLD: 8.4 % (ref ?–5.8)
POCT INT CON NEG: NEGATIVE
POCT INT CON POS: POSITIVE

## 2024-01-05 PROCEDURE — 83036 HEMOGLOBIN GLYCOSYLATED A1C: CPT

## 2024-01-05 PROCEDURE — 99213 OFFICE O/P EST LOW 20 MIN: CPT | Mod: GE

## 2024-01-05 PROCEDURE — 3078F DIAST BP <80 MM HG: CPT

## 2024-01-05 PROCEDURE — 3074F SYST BP LT 130 MM HG: CPT

## 2024-01-05 RX ORDER — NYSTATIN 100000 U/G
1 CREAM TOPICAL 2 TIMES DAILY
Qty: 30 G | Refills: 1 | Status: CANCELLED | OUTPATIENT
Start: 2024-01-05

## 2024-01-05 RX ORDER — FERROUS SULFATE 325(65) MG
325 TABLET ORAL DAILY
Qty: 100 TABLET | Refills: 1 | Status: SHIPPED | OUTPATIENT
Start: 2024-01-05

## 2024-01-05 ASSESSMENT — ENCOUNTER SYMPTOMS
VOMITING: 0
EYE DISCHARGE: 0
SPUTUM PRODUCTION: 0
PALPITATIONS: 0
SHORTNESS OF BREATH: 0
NERVOUS/ANXIOUS: 0
ORTHOPNEA: 0
NECK PAIN: 0
WEAKNESS: 0
WEIGHT LOSS: 0
COUGH: 0
CLAUDICATION: 0
DIZZINESS: 0
DIARRHEA: 0
CONSTIPATION: 0
ABDOMINAL PAIN: 0
WHEEZING: 0
HEADACHES: 0
SORE THROAT: 0
DEPRESSION: 0
BLURRED VISION: 0
DOUBLE VISION: 0
BRUISES/BLEEDS EASILY: 0
SENSORY CHANGE: 0
HEARTBURN: 0
BLOOD IN STOOL: 0
FEVER: 0
CHILLS: 0
BACK PAIN: 0
INSOMNIA: 0
NAUSEA: 0

## 2024-01-05 ASSESSMENT — FIBROSIS 4 INDEX: FIB4 SCORE: 1.63

## 2024-01-05 ASSESSMENT — PATIENT HEALTH QUESTIONNAIRE - PHQ9: CLINICAL INTERPRETATION OF PHQ2 SCORE: 0

## 2024-01-05 NOTE — PROGRESS NOTES
Subjective:     CC: Follow-up on procedure    HPI:   David presents today for follow-up on his A-fib and diabetes.  Past medical history significant for type 2 diabetes without complication, hypothyroid, hyperlipidemia, and longstanding persistent A-fib on anticoagulation.    Patient underwent right heart catheterization on 11/2/2023 with Dr. Drake Ziegler.  He was found to have severe tricuspid regurgitation.  Follows up closely with cardiology for this.    For his diabetes, patient is on metformin 1000 mg twice daily, and Jardiance 10 mg daily.  Tolerating the medications well.  Hemoglobin A1c ordered at this visit.    Patient with history of toenail fungus.  Used to follow-up with podiatry in [place].  Requesting new referral for [place].  Referral was placed.    ROS:  Review of Systems   Constitutional:  Negative for chills, fever and weight loss.   HENT:  Negative for congestion, hearing loss and sore throat.    Eyes:  Negative for blurred vision, double vision and discharge.   Respiratory:  Negative for cough, sputum production, shortness of breath and wheezing.    Cardiovascular:  Negative for chest pain, palpitations, orthopnea, claudication and leg swelling.   Gastrointestinal:  Negative for abdominal pain, blood in stool, constipation, diarrhea, heartburn, melena, nausea and vomiting.   Genitourinary:  Negative for dysuria, frequency, hematuria and urgency.   Musculoskeletal:  Negative for back pain, joint pain and neck pain.   Skin:  Negative for rash.   Neurological:  Negative for dizziness, sensory change, weakness and headaches.   Endo/Heme/Allergies:  Negative for environmental allergies. Does not bruise/bleed easily.   Psychiatric/Behavioral:  Negative for depression. The patient is not nervous/anxious and does not have insomnia.        Objective:     Exam:  /72 (BP Location: Left arm, Patient Position: Sitting, BP Cuff Size: Adult)   Pulse 77   Temp 36.3 °C (97.3 °F) (Temporal)   Ht  "1.77 m (5' 9.69\")   Wt 79.2 kg (174 lb 9.6 oz)   SpO2 98%   BMI 25.28 kg/m²  Body mass index is 25.28 kg/m².    Physical Exam  Constitutional:       General: He is not in acute distress.     Appearance: Normal appearance. He is normal weight. He is not ill-appearing.   HENT:      Head: Normocephalic and atraumatic.      Right Ear: External ear normal.      Left Ear: External ear normal.      Nose: Nose normal. No rhinorrhea.      Mouth/Throat:      Mouth: Mucous membranes are moist.      Pharynx: Oropharynx is clear.   Eyes:      General:         Right eye: No discharge.         Left eye: No discharge.      Extraocular Movements: Extraocular movements intact.      Pupils: Pupils are equal, round, and reactive to light.   Neck:      Vascular: No carotid bruit.   Cardiovascular:      Rate and Rhythm: Normal rate and regular rhythm.      Pulses: Normal pulses.      Heart sounds: Normal heart sounds. No murmur heard.     No friction rub.   Pulmonary:      Effort: Pulmonary effort is normal. No respiratory distress.      Breath sounds: Normal breath sounds. No wheezing or rhonchi.   Abdominal:      General: Abdomen is flat. Bowel sounds are normal.      Palpations: Abdomen is soft.      Tenderness: There is no abdominal tenderness. There is no right CVA tenderness or left CVA tenderness.      Hernia: No hernia is present.   Musculoskeletal:         General: No tenderness. Normal range of motion.      Cervical back: Normal range of motion. No rigidity.      Right lower leg: No edema.      Left lower leg: No edema.   Skin:     General: Skin is warm and dry.      Capillary Refill: Capillary refill takes less than 2 seconds.      Findings: No lesion or rash.   Neurological:      General: No focal deficit present.      Mental Status: He is alert and oriented to person, place, and time. Mental status is at baseline.   Psychiatric:         Mood and Affect: Mood normal.         Behavior: Behavior normal.         Thought " Content: Thought content normal.         Judgment: Judgment normal.       Labs:   Previous lab was discussed with the patient, no follow-up questions.    Assessment & Plan:     83 y.o. male with the following -    1. Severe tricuspid regurgitation  Noted on echocardiogram on 10/18/2023 as well as right heart catheterization on 11/2/2023.  Normal pulmonary pressures per right heart cath.  Patient is following up pretty closely with cardiology, [facility], where he is currently in talks with being transferred to [facility] for tricuspid clip.    2. Longstanding persistent atrial fibrillation (HCC)  On apixaban 5 mg twice daily, and metoprolol 50 mg twice daily.  Continue medications as indicated.  Continue to monitor heart rate at home    1. Type 2 diabetes mellitus without complication (HCC)  For his diabetes, patient is on metformin 1000 mg twice daily, and Jardiance 10 mg daily.  Tolerating the medications well.  Hemoglobin A1c ordered at this visit.  - POCT  A1C    2. Toenail fungus  Patient with history of toenail fungus.  Used to follow-up with podiatry in [place].  Requesting new referral for [place].  Referral was placed.  - Referral to Podiatry    3. Anemia, unspecified type  - ferrous sulfate 325 (65 Fe) MG tablet; Take 1 Tablet by mouth every day. Only taking 3 times a week  Dispense: 100 Tablet; Refill: 1      I spent a total of 30 minutes with record review, exam, communication with the patient, communication with other providers, and documentation of this encounter.    Return in about 3 months (around 4/5/2024).    Please note that this dictation was created using voice recognition software. I have made every reasonable attempt to correct obvious errors, but I expect that there are errors of grammar and possibly content that I did not discover before finalizing the note.    Mary Munoz M.D.  Internal Medicine Resident  [facility]

## 2024-01-12 ENCOUNTER — TELEPHONE (OUTPATIENT)
Dept: HEALTH INFORMATION MANAGEMENT | Facility: OTHER | Age: 84
End: 2024-01-12
Payer: MEDICARE

## 2024-01-16 PROBLEM — D68.318 ACQUIRED CIRCULATING ANTICOAGULANTS (HCC): Status: ACTIVE | Noted: 2024-01-16

## 2024-01-30 ENCOUNTER — HOSPITAL ENCOUNTER (OUTPATIENT)
Dept: LAB | Facility: MEDICAL CENTER | Age: 84
End: 2024-01-30
Payer: MEDICARE

## 2024-01-30 DIAGNOSIS — E78.00 PURE HYPERCHOLESTEROLEMIA: ICD-10-CM

## 2024-01-30 LAB
CHOLEST SERPL-MCNC: 120 MG/DL (ref 100–199)
HDLC SERPL-MCNC: 51 MG/DL
LDLC SERPL CALC-MCNC: 54 MG/DL
TRIGL SERPL-MCNC: 75 MG/DL (ref 0–149)

## 2024-01-30 PROCEDURE — 80061 LIPID PANEL: CPT

## 2024-01-30 PROCEDURE — 36415 COLL VENOUS BLD VENIPUNCTURE: CPT

## 2024-02-06 NOTE — TELEPHONE ENCOUNTER
Received request via: Pharmacy    Was the patient seen in the last year in this department? Yes    Does the patient have an active prescription (recently filled or refills available) for medication(s) requested? No    Pharmacy Name: [facility]     Does the patient have [facility] and need 100 day supply (blood pressure, diabetes and cholesterol meds only)? Yes, quantity updated to 100 days

## 2024-02-07 RX ORDER — LANCETS 28 GAUGE
EACH MISCELLANEOUS
Qty: 100 EACH | Refills: 3 | Status: SHIPPED | OUTPATIENT
Start: 2024-02-07

## 2024-02-14 ENCOUNTER — TELEPHONE (OUTPATIENT)
Dept: INTERNAL MEDICINE | Facility: OTHER | Age: 84
End: 2024-02-14
Payer: MEDICARE

## 2024-02-15 NOTE — TELEPHONE ENCOUNTER
Incoming fax from [facility],    Jardiance    Patient may save money on a 100 day supply. Please review and make any changes if applicable.     Letter has been scanned

## 2024-02-27 DIAGNOSIS — E11.9 TYPE 2 DIABETES MELLITUS WITHOUT COMPLICATION, WITHOUT LONG-TERM CURRENT USE OF INSULIN (HCC): ICD-10-CM

## 2024-02-27 DIAGNOSIS — I48.11 LONGSTANDING PERSISTENT ATRIAL FIBRILLATION (HCC): ICD-10-CM

## 2024-02-27 DIAGNOSIS — E11.9 TYPE 2 DIABETES MELLITUS WITHOUT COMPLICATION, UNSPECIFIED WHETHER LONG TERM INSULIN USE (HCC): ICD-10-CM

## 2024-02-27 RX ORDER — EMPAGLIFLOZIN 10 MG/1
1 TABLET, FILM COATED ORAL
Qty: 100 TABLET | Refills: 1 | Status: SHIPPED | OUTPATIENT
Start: 2024-02-27

## 2024-03-05 DIAGNOSIS — E03.9 HYPOTHYROIDISM, UNSPECIFIED TYPE: ICD-10-CM

## 2024-03-05 DIAGNOSIS — E11.9 TYPE 2 DIABETES MELLITUS WITHOUT COMPLICATION, UNSPECIFIED WHETHER LONG TERM INSULIN USE (HCC): ICD-10-CM

## 2024-03-05 DIAGNOSIS — I48.11 LONGSTANDING PERSISTENT ATRIAL FIBRILLATION (HCC): ICD-10-CM

## 2024-03-06 DIAGNOSIS — I07.1 SEVERE TRICUSPID REGURGITATION: ICD-10-CM

## 2024-03-06 DIAGNOSIS — I48.11 LONGSTANDING PERSISTENT ATRIAL FIBRILLATION (HCC): ICD-10-CM

## 2024-03-06 DIAGNOSIS — E11.9 TYPE 2 DIABETES MELLITUS WITHOUT COMPLICATION, UNSPECIFIED WHETHER LONG TERM INSULIN USE (HCC): ICD-10-CM

## 2024-03-06 RX ORDER — FUROSEMIDE 20 MG/1
20 TABLET ORAL DAILY
Qty: 100 TABLET | Refills: 3 | Status: SHIPPED | OUTPATIENT
Start: 2024-03-06

## 2024-04-01 ENCOUNTER — APPOINTMENT (OUTPATIENT)
Dept: URGENT CARE | Facility: PHYSICIAN GROUP | Age: 84
End: 2024-04-01
Payer: MEDICARE

## 2024-04-03 ENCOUNTER — APPOINTMENT (OUTPATIENT)
Dept: CARDIOLOGY | Facility: MEDICAL CENTER | Age: 84
End: 2024-04-03
Payer: MEDICARE

## 2024-04-05 ENCOUNTER — OFFICE VISIT (OUTPATIENT)
Dept: INTERNAL MEDICINE | Facility: OTHER | Age: 84
End: 2024-04-05
Payer: MEDICARE

## 2024-04-05 VITALS
SYSTOLIC BLOOD PRESSURE: 118 MMHG | BODY MASS INDEX: 24.97 KG/M2 | HEART RATE: 66 BPM | OXYGEN SATURATION: 96 % | HEIGHT: 70 IN | DIASTOLIC BLOOD PRESSURE: 77 MMHG | WEIGHT: 174.4 LBS | TEMPERATURE: 99.2 F

## 2024-04-05 DIAGNOSIS — K40.90 RIGHT GROIN HERNIA: ICD-10-CM

## 2024-04-05 DIAGNOSIS — E11.9 TYPE 2 DIABETES MELLITUS WITHOUT COMPLICATION, WITHOUT LONG-TERM CURRENT USE OF INSULIN (HCC): Chronic | ICD-10-CM

## 2024-04-05 DIAGNOSIS — J02.9 SORE THROAT: ICD-10-CM

## 2024-04-05 DIAGNOSIS — R05.1 ACUTE COUGH: ICD-10-CM

## 2024-04-05 DIAGNOSIS — I48.11 LONGSTANDING PERSISTENT ATRIAL FIBRILLATION (HCC): Chronic | ICD-10-CM

## 2024-04-05 LAB
FLUAV RNA SPEC QL NAA+PROBE: NEGATIVE
FLUBV RNA SPEC QL NAA+PROBE: NEGATIVE
RSV RNA SPEC QL NAA+PROBE: NEGATIVE
S PYO DNA SPEC NAA+PROBE: NOT DETECTED
SARS-COV-2 RNA RESP QL NAA+PROBE: NEGATIVE

## 2024-04-05 PROCEDURE — 99214 OFFICE O/P EST MOD 30 MIN: CPT | Mod: GC

## 2024-04-05 PROCEDURE — 87651 STREP A DNA AMP PROBE: CPT

## 2024-04-05 PROCEDURE — 3078F DIAST BP <80 MM HG: CPT | Mod: GC

## 2024-04-05 PROCEDURE — 3074F SYST BP LT 130 MM HG: CPT | Mod: GC

## 2024-04-05 PROCEDURE — 0241U PR NFCT DS VIR RESP RNA 4 TRGT: CPT

## 2024-04-05 ASSESSMENT — ENCOUNTER SYMPTOMS
DOUBLE VISION: 0
EYE DISCHARGE: 0
SORE THROAT: 0
NAUSEA: 0
DIZZINESS: 0
SHORTNESS OF BREATH: 0
ORTHOPNEA: 0
BACK PAIN: 0
INSOMNIA: 0
VOMITING: 0
CONSTIPATION: 0
SPUTUM PRODUCTION: 0
HEARTBURN: 0
BRUISES/BLEEDS EASILY: 0
PALPITATIONS: 0
COUGH: 0
ABDOMINAL PAIN: 0
FEVER: 0
CLAUDICATION: 0
DIARRHEA: 0
SENSORY CHANGE: 0
NECK PAIN: 0
WEIGHT LOSS: 0
CHILLS: 0
BLOOD IN STOOL: 0
HEADACHES: 0
DEPRESSION: 0
BLURRED VISION: 0
WEAKNESS: 0
NERVOUS/ANXIOUS: 0
WHEEZING: 0

## 2024-04-05 ASSESSMENT — FIBROSIS 4 INDEX: FIB4 SCORE: 1.63

## 2024-04-05 NOTE — PROGRESS NOTES
Subjective:     CC: Follow-up, feeling sick    HPI:   David presents today for follow-up on his A-fib and diabetes.  Past medical history significant for type 2 diabetes without complication, hypothyroid, hyperlipidemia, and longstanding persistent A-fib on anticoagulation.  At this visit, he like to discuss an acute concern of upper respiratory infection and lump in his right groin.     Patient states that his wife got sick about 2 weeks ago, and for the last couple of days he has been having a cough, and sore throat.  Patient denies any shortness of breath or chest pain/palpitations.  No other sick contacts, no travel otherwise.  Will test the patient for COVID, RSV, flu, and strep throat.  Additionally, if any antibiotics antivirals are indicated, will prescribe this with the patient.  No other acute concerns at this time, patient is saturating well, blood pressure and pulse within normal limits.    Additionally, patient has concerns of a right groin lump.  On physical exam, it appears to be a right inguinal hernia.  This lump appeared about a week ago.  Patient denies any pain.  No concern for strangulation or incarceration, no gangrenous etiology.  Patient denies any lump in the left side.  The hernia gets worse when he walks quite a bit or stands for long peers of time.  Worsens on coughing.  Few days ago, patient states that he was uncomfortable due to slight tenderness/pain to the right groin.  This has not since recurred.  Given his hernia, patient will benefit from general surgery referral for further evaluation.    For his diabetes, this is a chronic condition, patient takes metformin 1000 mg twice daily, and empagliflozin 10 mg daily.  Patient is tolerating the medications very well.  Given his age and related risk factors, it is appropriate to have his hemoglobin A1c above 7.5%, since having low hemoglobin A1c levels will increase his risk of falls, and subsequent injury.  Continue current  "medications.    This is a chronic condition, patient currently on apixaban 5 mg twice daily and metoprolol 50 mg twice daily as well.  Tolerating the medications very well.  No concerns for chest pain or palpitations, no red flag symptoms.  No melena, hematochezia, or other signs of hemorrhage.  Continue current medication regimen.    ROS:  Review of Systems   Constitutional:  Negative for chills, fever and weight loss.   HENT:  Negative for congestion, hearing loss and sore throat.    Eyes:  Negative for blurred vision, double vision and discharge.   Respiratory:  Negative for cough, sputum production, shortness of breath and wheezing.    Cardiovascular:  Negative for chest pain, palpitations, orthopnea, claudication and leg swelling.   Gastrointestinal:  Negative for abdominal pain, blood in stool, constipation, diarrhea, heartburn, melena, nausea and vomiting.   Genitourinary:  Negative for dysuria, frequency, hematuria and urgency.   Musculoskeletal:  Negative for back pain, joint pain and neck pain.   Skin:  Negative for rash.   Neurological:  Negative for dizziness, sensory change, weakness and headaches.   Endo/Heme/Allergies:  Negative for environmental allergies. Does not bruise/bleed easily.   Psychiatric/Behavioral:  Negative for depression. The patient is not nervous/anxious and does not have insomnia.        Objective:     Exam:  /77 (BP Location: Left arm, Patient Position: Sitting, BP Cuff Size: Adult)   Pulse 66   Temp 37.3 °C (99.2 °F) (Temporal)   Ht 1.778 m (5' 10\")   Wt 79.1 kg (174 lb 6.4 oz)   SpO2 96%   BMI 25.02 kg/m²  Body mass index is 25.02 kg/m².    Physical Exam  Constitutional:       General: He is not in acute distress.     Appearance: Normal appearance. He is normal weight. He is not ill-appearing.   HENT:      Head: Normocephalic and atraumatic.      Right Ear: External ear normal.      Left Ear: External ear normal.      Nose: Nose normal. No rhinorrhea.      " Mouth/Throat:      Mouth: Mucous membranes are moist.      Pharynx: Oropharynx is clear.   Eyes:      General:         Right eye: No discharge.         Left eye: No discharge.      Extraocular Movements: Extraocular movements intact.      Pupils: Pupils are equal, round, and reactive to light.   Neck:      Vascular: No carotid bruit.   Cardiovascular:      Rate and Rhythm: Normal rate and regular rhythm.      Pulses: Normal pulses.      Heart sounds: Normal heart sounds. No murmur heard.     No friction rub.   Pulmonary:      Effort: Pulmonary effort is normal. No respiratory distress.      Breath sounds: Normal breath sounds. No wheezing or rhonchi.   Abdominal:      General: Abdomen is flat. Bowel sounds are normal.      Palpations: Abdomen is soft.      Tenderness: There is no abdominal tenderness. There is no right CVA tenderness or left CVA tenderness.      Hernia: No hernia is present.   Musculoskeletal:         General: No tenderness. Normal range of motion.      Cervical back: Normal range of motion. No rigidity.      Right lower leg: No edema.      Left lower leg: No edema.   Skin:     General: Skin is warm and dry.      Capillary Refill: Capillary refill takes less than 2 seconds.      Findings: No lesion or rash.   Neurological:      General: No focal deficit present.      Mental Status: He is alert and oriented to person, place, and time. Mental status is at baseline.   Psychiatric:         Mood and Affect: Mood normal.         Behavior: Behavior normal.         Thought Content: Thought content normal.         Judgment: Judgment normal.       Labs:   Previous labs were reviewed and discussed with patient, no follow-up questions.      Assessment & Plan:     83 y.o. male with the following -    1. Acute cough  2. Sore throat  Patient states that his wife got sick about 2 weeks ago, and for the last couple of days he has been having a cough, and sore throat.  Patient denies any shortness of breath or chest  pain/palpitations.  No other sick contacts, no travel otherwise.  Will test the patient for COVID, RSV, flu, and strep throat.  Additionally, if any antibiotics antivirals are indicated, will prescribe this with the patient.  No other acute concerns at this time, patient is saturating well, blood pressure and pulse within normal limits.  - POCT CoV-2, Flu A/B, RSV by PCR  - POCT CEPHEID GROUP A STREP - PCR    3. Right groin hernia  Additionally, patient has concerns of a right groin lump.  On physical exam, it appears to be a right inguinal hernia.  This lump appeared about a week ago.  Patient denies any pain.  No concern for strangulation or incarceration, no gangrenous etiology.  Patient denies any lump in the left side.  The hernia gets worse when he walks quite a bit or stands for long peers of time.  Worsens on coughing.  Few days ago, patient states that he was uncomfortable due to slight tenderness/pain to the right groin.  This has not since recurred.  Given his hernia, patient will benefit from general surgery referral for further evaluation.  - Referral to General Surgery    4. Type 2 diabetes mellitus without complication, without long-term current use of insulin (HCC)  For his diabetes, this is a chronic condition, patient takes metformin 1000 mg twice daily, and empagliflozin 10 mg daily.  Patient is tolerating the medications very well.  Given his age and related risk factors, it is appropriate to have his hemoglobin A1c above 7.5%, since having low hemoglobin A1c levels will increase his risk of falls, and subsequent injury.  Results concerning specially given patient is on anticoagulation with apixaban for his persistent A-fib. Continue current medications.  If hemoglobin A1c is elevated more than 8.5%, may increase empagliflozin to 25 mg.  - Comp Metabolic Panel; Future  - Hemoglobin A1c; Future  - Lipid Profile; Future  - Microalbumin Creat Ratio Urine - Lab Collect; Future  - Diabetic  Monofilament Lower Extremity Exam    5. Longstanding persistent atrial fibrillation (HCC)  This is a chronic condition, patient currently on apixaban 5 mg twice daily and metoprolol 50 mg twice daily as well.  Tolerating the medications very well.  No concerns for chest pain or palpitations, no red flag symptoms.  No melena, hematochezia, or other signs of hemorrhage.  Continue current medication regimen.      I spent a total of 20 minutes with record review, exam, communication with the patient, communication with other providers, and documentation of this encounter.    Return in about 3 months (around 7/5/2024).    Please note that this dictation was created using voice recognition software. I have made every reasonable attempt to correct obvious errors, but I expect that there are errors of grammar and possibly content that I did not discover before finalizing the note.    Mary Munoz M.D.  Internal Medicine Resident  [facility]

## 2024-04-11 ENCOUNTER — TELEPHONE (OUTPATIENT)
Dept: CARDIOLOGY | Facility: MEDICAL CENTER | Age: 84
End: 2024-04-11
Payer: MEDICARE

## 2024-04-11 ENCOUNTER — OFFICE VISIT (OUTPATIENT)
Dept: CARDIOLOGY | Facility: MEDICAL CENTER | Age: 84
End: 2024-04-11
Payer: MEDICARE

## 2024-04-11 VITALS
HEART RATE: 60 BPM | SYSTOLIC BLOOD PRESSURE: 112 MMHG | WEIGHT: 170 LBS | DIASTOLIC BLOOD PRESSURE: 70 MMHG | BODY MASS INDEX: 24.34 KG/M2 | OXYGEN SATURATION: 98 % | HEIGHT: 70 IN

## 2024-04-11 DIAGNOSIS — I48.11 LONGSTANDING PERSISTENT ATRIAL FIBRILLATION (HCC): Chronic | ICD-10-CM

## 2024-04-11 DIAGNOSIS — I34.0 MODERATE MITRAL REGURGITATION: ICD-10-CM

## 2024-04-11 DIAGNOSIS — D68.69 SECONDARY HYPERCOAGULABLE STATE (HCC): ICD-10-CM

## 2024-04-11 DIAGNOSIS — E78.00 PURE HYPERCHOLESTEROLEMIA: ICD-10-CM

## 2024-04-11 DIAGNOSIS — I07.1 SEVERE TRICUSPID REGURGITATION: ICD-10-CM

## 2024-04-11 DIAGNOSIS — R93.1 AGATSTON CORONARY ARTERY CALCIUM SCORE BETWEEN 100 AND 400: ICD-10-CM

## 2024-04-11 PROCEDURE — 99214 OFFICE O/P EST MOD 30 MIN: CPT

## 2024-04-11 PROCEDURE — 3078F DIAST BP <80 MM HG: CPT

## 2024-04-11 PROCEDURE — 3074F SYST BP LT 130 MM HG: CPT

## 2024-04-11 PROCEDURE — 99213 OFFICE O/P EST LOW 20 MIN: CPT

## 2024-04-11 ASSESSMENT — ENCOUNTER SYMPTOMS
DEPRESSION: 0
NEUROLOGICAL NEGATIVE: 1
GASTROINTESTINAL NEGATIVE: 1
PND: 0
PALPITATIONS: 0
EYES NEGATIVE: 1
ORTHOPNEA: 0
SHORTNESS OF BREATH: 1
NERVOUS/ANXIOUS: 0
MUSCULOSKELETAL NEGATIVE: 1

## 2024-04-11 ASSESSMENT — FIBROSIS 4 INDEX: FIB4 SCORE: 1.63

## 2024-04-11 NOTE — PROGRESS NOTES
Chief Complaint   Patient presents with    Atrial Fibrillation     F/V Dx: Longstanding persistent atrial fibrillation (HCC)    Hyperlipidemia    Other     F/V Dx: Severe tricuspid regurgitation       Subjective     David Owen is a 83 y.o. male who presents today for follow up.  They have a history of chronic atrial fibrillation, hyperlipidemia, hypothyroidism and diabetes     They are accompanied today by their wife Karly      Seen by myself on 6/27/2023 since their last visit they have been feeling shortness of breath with activity class II symptoms, getting worse over the past few months.  I ordered an echocardiogram and CT calcium score.  Echocardiogram on 10/18/2023 showed severe tricuspid regurgitation and moderate mitral regurgitation.  Coronary calcium score was 194.6     Visit with myself on 10/18/2023 some shortness of breath after a moderate amount of physical activity. No symptoms of chest pain, palpitations, or lower extremity edema.  Ordered a right heart cath for him     At my visit with him on 11/29/2023 he underwent right heart catheterization on 11/2/2023 which showed normal pulmonary artery pressures.  Referral was placed to [facility] for management of his TR. One episode of HR in the low 100s, otherwise A-fib has been well-controlled.  Unfortunately patient has hit some roadblocks with insurance approval of him going to [facility].  He does report ongoing shortness of breath with activity, some mild lower extremity edema, but denies chest pain or palpitations.     Seen by myself on 1/3/2024 he is able to walk about 1 mile a day on the treadmill, overall he is feeling really stable.  He does still have occasional shortness of breath, denies lower extremity edema at this time, no chest pain or palpitations.    Today 4/11/2024 stable with his shortness of breath. He does get fatigue in the afternoon.  Stable lower extremity edema, denies chest pain and palpitations.        Past Medical  History:   Diagnosis Date    Arrhythmia 11/01/2023    a fib    Arthritis 11/01/2023    hands    Cataract 2022    bilateral IOL    Diabetes (HCC) 2023    Disorder of thyroid     High cholesterol     Hypertension      Past Surgical History:   Procedure Laterality Date    OTHER  2022    IOL right    OTHER  2020    kidney stones/stent    OTHER CARDIAC SURGERY  2020    angiogram    OTHER  2017    tubes in ears bilat.    OTHER ORTHOPEDIC SURGERY  2007    rt. shoulder rotator cuff repair    OTHER ORTHOPEDIC SURGERY  1980    rt. knee arthroscopy    OTHER ORTHOPEDIC SURGERY  1977    lt shoulder repair    OTHER ORTHOPEDIC SURGERY  1973    right hand tendon repair    OTHER ABDOMINAL SURGERY  1955    appy    OTHER ORTHOPEDIC SURGERY  1954    left index finger re-attached    OTHER      tonsills as child    OTHER ORTHOPEDIC SURGERY      left tendon repair hand     Family History   Problem Relation Age of Onset    Heart Attack Brother 38     Social History     Socioeconomic History    Marital status:      Spouse name: Not on file    Number of children: Not on file    Years of education: Not on file    Highest education level: Not on file   Occupational History    Not on file   Tobacco Use    Smoking status: Former    Smokeless tobacco: Never   Vaping Use    Vaping Use: Never used   Substance and Sexual Activity    Alcohol use: Not Currently    Drug use: Never    Sexual activity: Yes     Partners: Female   Other Topics Concern    Not on file   Social History Narrative    Not on file     Social Determinants of Health     Financial Resource Strain: Not on file   Food Insecurity: Not on file   Transportation Needs: Not on file   Physical Activity: Not on file   Stress: Not on file   Social Connections: Not on file   Intimate Partner Violence: Not on file   Housing Stability: Not on file     No Known Allergies  Outpatient Encounter Medications as of 4/11/2024   Medication Sig Dispense Refill    furosemide (LASIX) 20 MG Tab TAKE  1 TABLET BY MOUTH EVERY  Tablet 3    Empagliflozin (JARDIANCE) 10 MG Tab tablet Take 1 Tablet by mouth every day. 100 Tablet 1    FreeStyle Lancets Misc USE AS DIRECTED ONCE DAILY 100 Each 3    ferrous sulfate 325 (65 Fe) MG tablet Take 1 Tablet by mouth every day. Only taking 3 times a week 100 Tablet 1    levothyroxine (SYNTHROID) 300 MCG Tab Take 300 mcg by mouth every evening.      Non Formulary Request Joint supplement 2 tabs BID po.      rosuvastatin (CRESTOR) 20 MG Tab Take 1 Tablet by mouth every evening. 100 Tablet 3    metoprolol tartrate (LOPRESSOR) 50 MG Tab Take 1 Tablet by mouth 2 times a day. 200 Tablet 3    ELIQUIS 5 MG Tab Take 1 Tablet by mouth 2 times a day. 180 Tablet 1    FREESTYLE LITE strip USE AS DIRECTED TO CHECK BLOOD SUGAR ONCE DAILY      metformin (GLUCOPHAGE) 1000 MG tablet Take 1 Tablet by mouth 2 times a day with meals. 200 Tablet 1    Coral Calcium 1000 (390 Ca) MG Tab Take 1,000 mg by mouth 2 times a day.      thyroid (ARMOUR THYROID) 120 MG Tab Take 1 Tablet by mouth every day. 30 Tablet 2    zinc sulfate (ZINCATE) 220 (50 Zn) MG Cap Take 50 mg by mouth every evening.      ascorbic acid (VITAMIN C) 500 MG tablet Take 500 mg by mouth 2 times a day.      vitamin D3 (CHOLECALCIFEROL) 1000 Unit (25 mcg) Tab Take 5,000 Units by mouth 2 (two) times a day.       No facility-administered encounter medications on file as of 4/11/2024.     Review of Systems   Constitutional:  Positive for malaise/fatigue.   HENT: Negative.     Eyes: Negative.    Respiratory:  Positive for shortness of breath.    Cardiovascular:  Negative for chest pain, palpitations, orthopnea, leg swelling and PND.   Gastrointestinal: Negative.    Genitourinary: Negative.    Musculoskeletal: Negative.    Skin: Negative.    Neurological: Negative.    Endo/Heme/Allergies: Negative.    Psychiatric/Behavioral:  Negative for depression. The patient is not nervous/anxious.               Objective     /70 (BP Location:  "Left arm, Patient Position: Sitting, BP Cuff Size: Adult)   Pulse 60   Ht 1.778 m (5' 10\")   Wt 77.1 kg (170 lb)   SpO2 98%   BMI 24.39 kg/m²     Physical Exam  Constitutional:       Appearance: Normal appearance.   HENT:      Head: Normocephalic.   Neck:      Vascular: No JVD.   Cardiovascular:      Rate and Rhythm: Normal rate. Rhythm irregular.      Pulses: Normal pulses.      Heart sounds: Murmur heard.      Systolic murmur is present with a grade of 2/6.      No friction rub.   Pulmonary:      Effort: Pulmonary effort is normal.      Breath sounds: Normal breath sounds.   Abdominal:      Palpations: Abdomen is soft.   Musculoskeletal:         General: Normal range of motion.      Right lower leg: No edema.      Left lower leg: No edema.   Skin:     General: Skin is warm and dry.   Neurological:      General: No focal deficit present.      Mental Status: He is alert and oriented to person, place, and time.   Psychiatric:         Mood and Affect: Mood normal.         Behavior: Behavior normal.            Lab Results   Component Value Date/Time    CHOLSTRLTOT 120 01/30/2024 10:27 AM    LDL 54 01/30/2024 10:27 AM    HDL 51 01/30/2024 10:27 AM    TRIGLYCERIDE 75 01/30/2024 10:27 AM       Lab Results   Component Value Date/Time    SODIUM 140 11/02/2023 06:35 AM    POTASSIUM 4.2 11/02/2023 06:35 AM    CHLORIDE 101 11/02/2023 06:35 AM    CO2 26 11/02/2023 06:35 AM    GLUCOSE 187 (H) 11/02/2023 06:35 AM    BUN 26 (H) 11/02/2023 06:35 AM    CREATININE 0.95 11/02/2023 06:35 AM     Lab Results   Component Value Date/Time    ALKPHOSPHAT 41 11/02/2023 06:35 AM    ASTSGOT 18 11/02/2023 06:35 AM    ALTSGPT 20 11/02/2023 06:35 AM    TBILIRUBIN 0.4 11/02/2023 06:35 AM      Echocardiogram 11/4/2022  CONCLUSIONS  Patient is in atrial fibrillation throughout the study.  Low normal left ventricular systolic function, visually estimated LVEF   of 50%.    Normal right ventricular systolic function.   Moderate left and mild right " atrial enlargement.  Mild mitral regurgitation.  Mild tricuspid regurgitation.  Estimted RVSP of 32 mmHg+ RA pressure.  No prior study is available for comparison.      Coronary calcium score 10/2/2023  FINDINGS:  Coronary calcification:  LMA - 0.0  LCX - 77.4  LAD - 0.0  RCA - 117.2     Total Calcium Score: 194.6     Percentile: Calcium score is above the 25th percentile for the patient's age and sex.     Echocardiogram 10/18/2023  CONCLUSIONS  The right ventricle is dilated.  Reduced right ventricular systolic function.  Severe tricuspid regurgitation.  Moderate mitral regurgitation  Normal left ventricular systolic function.    Right heart catheterization 11/2/2023  Mean right atrial pressure: 4 mmHg  Right ventricular pressure: 30/8 mmHg  Pulmonary artery pressure: 32/16/22 mmHg  Mean pulmonary capillary wedge pressure: 9 mmHg  Pulmonary artery saturation: 58%  Systemic arterial saturation: 92%  Michelle cardiac output: 3.4 L/min  Michelle cardiac index: 1.8 L/min/m2  Thermodilution cardiac output: 3.7 L/min  Thermodilution cardiac index: 1.9 L/min/m2  Pulmonary vascular resistance: 3.5 Wood units     IMPRESSION:  Normal right heart filling pressures.  Normal left heart filling pressures.  Mildly elevated pulmonary pressures.  Moderately reduced cardiac output.    Assessment & Plan     1. Severe tricuspid regurgitation  EC-GINETTE W/O CONT    CANCELED: EC-GINETTE W/O CONT      2. Longstanding persistent atrial fibrillation (HCC)        3. Agatston coronary artery calcium score between 100 and 400        4. Moderate mitral regurgitation        5. Pure hypercholesterolemia        6. Secondary hypercoagulable state (HCC)            Medical Decision Making: Today's Assessment/Status/Plan:        Atrial fibrillation  -Continue Eliquis 5 mg twice daily  -continue metoprolol 50 mg twice daily     Severe tricuspid regurgitation and moderate mitral regurgitation  -Noted on echocardiogram on 10/18/2023  -Right heart catheterization  showed normal pulmonary pressures  -Initially patient had been referred to [facility] to undergo Tri clip, due to insurance issues this was never able to be set up.  -We will get him further evaluation with GINETTE here and plan for possible Tri clip here at [facility] when we are able to provide it     Hyperlipidemia with positive coronary calcium score  -Most recent LDL 54  -continue rosuvastatin  -Goal of less than 70  -Check lipid panel annually         Follow-up with Dr. Antonio after GINETTE     This note was dictated using Dragon speech recognition software.

## 2024-04-16 DIAGNOSIS — D64.9 ANEMIA, UNSPECIFIED TYPE: ICD-10-CM

## 2024-04-16 NOTE — TELEPHONE ENCOUNTER
Received request via: Pharmacy    Was the patient seen in the last year in this department? Yes    Does the patient have an active prescription (recently filled or refills available) for medication(s) requested? No    [email or web address]    Does the patient have [facility] and need 100 day supply (blood pressure, diabetes and cholesterol meds only)? Patient does not have SCP

## 2024-04-16 NOTE — TELEPHONE ENCOUNTER
Patient is scheduled on 4-29-24 for a GINETTE w/cons sed with Dr. Fall. Patient was told to hold Jardiance 4 days prior, hold lasix and metformin AM day of. Patient to check in at 6:00 for an 8:00 procedure. H&P was  done on 4-11-24 by Ute Chandler. Pre admit to call patient.

## 2024-04-16 NOTE — TELEPHONE ENCOUNTER
Colin Fall M.D.  Estrella Liriano  Caller: Unspecified (5 days ago, 11:37 AM)  I'm OK with it    Thanks  BE

## 2024-04-18 ENCOUNTER — HOSPITAL ENCOUNTER (OUTPATIENT)
Dept: LAB | Facility: MEDICAL CENTER | Age: 84
End: 2024-04-18
Payer: MEDICARE

## 2024-04-18 ENCOUNTER — TELEPHONE (OUTPATIENT)
Dept: CARDIOLOGY | Facility: MEDICAL CENTER | Age: 84
End: 2024-04-18

## 2024-04-18 DIAGNOSIS — E11.9 TYPE 2 DIABETES MELLITUS WITHOUT COMPLICATION, WITHOUT LONG-TERM CURRENT USE OF INSULIN (HCC): Chronic | ICD-10-CM

## 2024-04-18 DIAGNOSIS — E11.9 TYPE 2 DIABETES MELLITUS WITHOUT COMPLICATION, UNSPECIFIED WHETHER LONG TERM INSULIN USE (HCC): ICD-10-CM

## 2024-04-18 DIAGNOSIS — I48.11 LONGSTANDING PERSISTENT ATRIAL FIBRILLATION (HCC): ICD-10-CM

## 2024-04-18 DIAGNOSIS — I07.1 SEVERE TRICUSPID REGURGITATION: ICD-10-CM

## 2024-04-18 LAB
ALBUMIN SERPL BCP-MCNC: 4.4 G/DL (ref 3.2–4.9)
ALBUMIN/GLOB SERPL: 1.4 G/DL
ALP SERPL-CCNC: 43 U/L (ref 30–99)
ALT SERPL-CCNC: 18 U/L (ref 2–50)
ANION GAP SERPL CALC-SCNC: 11 MMOL/L (ref 7–16)
AST SERPL-CCNC: 18 U/L (ref 12–45)
BILIRUB SERPL-MCNC: 0.5 MG/DL (ref 0.1–1.5)
BUN SERPL-MCNC: 19 MG/DL (ref 8–22)
CALCIUM ALBUM COR SERPL-MCNC: 9.6 MG/DL (ref 8.5–10.5)
CALCIUM SERPL-MCNC: 9.9 MG/DL (ref 8.5–10.5)
CHLORIDE SERPL-SCNC: 100 MMOL/L (ref 96–112)
CHOLEST SERPL-MCNC: 118 MG/DL (ref 100–199)
CO2 SERPL-SCNC: 26 MMOL/L (ref 20–33)
CREAT SERPL-MCNC: 0.77 MG/DL (ref 0.5–1.4)
CREAT UR-MCNC: 83.17 MG/DL
EST. AVERAGE GLUCOSE BLD GHB EST-MCNC: 209 MG/DL
FASTING STATUS PATIENT QL REPORTED: NORMAL
GFR SERPLBLD CREATININE-BSD FMLA CKD-EPI: 89 ML/MIN/1.73 M 2
GLOBULIN SER CALC-MCNC: 3.1 G/DL (ref 1.9–3.5)
GLUCOSE SERPL-MCNC: 158 MG/DL (ref 65–99)
HBA1C MFR BLD: 8.9 % (ref 4–5.6)
HDLC SERPL-MCNC: 47 MG/DL
LDLC SERPL CALC-MCNC: 49 MG/DL
MAGNESIUM SERPL-MCNC: 1.9 MG/DL (ref 1.5–2.5)
MICROALBUMIN UR-MCNC: 2.3 MG/DL
MICROALBUMIN/CREAT UR: 28 MG/G (ref 0–30)
POTASSIUM SERPL-SCNC: 4.8 MMOL/L (ref 3.6–5.5)
PROT SERPL-MCNC: 7.5 G/DL (ref 6–8.2)
SODIUM SERPL-SCNC: 137 MMOL/L (ref 135–145)
TRIGL SERPL-MCNC: 108 MG/DL (ref 0–149)

## 2024-04-18 PROCEDURE — 80053 COMPREHEN METABOLIC PANEL: CPT

## 2024-04-18 PROCEDURE — 83036 HEMOGLOBIN GLYCOSYLATED A1C: CPT

## 2024-04-18 PROCEDURE — 80061 LIPID PANEL: CPT

## 2024-04-18 PROCEDURE — 82043 UR ALBUMIN QUANTITATIVE: CPT

## 2024-04-18 PROCEDURE — 36415 COLL VENOUS BLD VENIPUNCTURE: CPT

## 2024-04-18 PROCEDURE — 83735 ASSAY OF MAGNESIUM: CPT

## 2024-04-18 PROCEDURE — 82570 ASSAY OF URINE CREATININE: CPT

## 2024-04-18 NOTE — TELEPHONE ENCOUNTER
Referral from: Renee CAIN for sev TR, mod MR. Patient is scheduled for GINETTE 4/29.    Patient called on 4/18/2024. Scheduled consult with Dr. Antonio for after the GINETTE.

## 2024-04-21 RX ORDER — APIXABAN 5 MG/1
5 TABLET, FILM COATED ORAL 2 TIMES DAILY
Qty: 180 TABLET | Refills: 1 | Status: SHIPPED | OUTPATIENT
Start: 2024-04-21

## 2024-04-21 RX ORDER — FERROUS SULFATE 325(65) MG
325 TABLET ORAL DAILY
Qty: 100 TABLET | Refills: 1 | Status: SHIPPED | OUTPATIENT
Start: 2024-04-21

## 2024-04-23 ENCOUNTER — APPOINTMENT (OUTPATIENT)
Dept: ADMISSIONS | Facility: MEDICAL CENTER | Age: 84
End: 2024-04-23
Attending: INTERNAL MEDICINE
Payer: MEDICARE

## 2024-04-24 ENCOUNTER — PRE-ADMISSION TESTING (OUTPATIENT)
Dept: ADMISSIONS | Facility: MEDICAL CENTER | Age: 84
End: 2024-04-24
Attending: INTERNAL MEDICINE
Payer: MEDICARE

## 2024-04-24 NOTE — OR NURSING
RN pre admit tele appointment complete.  Medication and fasting instructions given per cardiology.  Patient stated understanding of all instructions and had no further questions.

## 2024-04-29 ENCOUNTER — HOSPITAL ENCOUNTER (OUTPATIENT)
Facility: MEDICAL CENTER | Age: 84
End: 2024-04-29
Attending: INTERNAL MEDICINE | Admitting: INTERNAL MEDICINE
Payer: MEDICARE

## 2024-04-29 ENCOUNTER — APPOINTMENT (OUTPATIENT)
Dept: CARDIOLOGY | Facility: MEDICAL CENTER | Age: 84
End: 2024-04-29
Attending: INTERNAL MEDICINE
Payer: MEDICARE

## 2024-04-29 VITALS
WEIGHT: 171.74 LBS | HEIGHT: 69 IN | SYSTOLIC BLOOD PRESSURE: 116 MMHG | BODY MASS INDEX: 25.44 KG/M2 | DIASTOLIC BLOOD PRESSURE: 75 MMHG | HEART RATE: 73 BPM | OXYGEN SATURATION: 93 % | TEMPERATURE: 96.8 F | RESPIRATION RATE: 16 BRPM

## 2024-04-29 DIAGNOSIS — I07.1 SEVERE TRICUSPID REGURGITATION: ICD-10-CM

## 2024-04-29 PROCEDURE — 93325 DOPPLER ECHO COLOR FLOW MAPG: CPT | Mod: 26 | Performed by: INTERNAL MEDICINE

## 2024-04-29 PROCEDURE — 99153 MOD SED SAME PHYS/QHP EA: CPT

## 2024-04-29 PROCEDURE — 160046 HCHG PACU - 1ST 60 MINS PHASE II

## 2024-04-29 PROCEDURE — 99152 MOD SED SAME PHYS/QHP 5/>YRS: CPT | Mod: DUPCHRG | Performed by: INTERNAL MEDICINE

## 2024-04-29 PROCEDURE — 700111 HCHG RX REV CODE 636 W/ 250 OVERRIDE (IP): Performed by: INTERNAL MEDICINE

## 2024-04-29 PROCEDURE — 160002 HCHG RECOVERY MINUTES (STAT)

## 2024-04-29 PROCEDURE — 99152 MOD SED SAME PHYS/QHP 5/>YRS: CPT | Performed by: INTERNAL MEDICINE

## 2024-04-29 PROCEDURE — 160035 HCHG PACU - 1ST 60 MINS PHASE I

## 2024-04-29 PROCEDURE — 93312 ECHO TRANSESOPHAGEAL: CPT | Mod: 26 | Performed by: INTERNAL MEDICINE

## 2024-04-29 RX ORDER — MIDAZOLAM HYDROCHLORIDE 1 MG/ML
.5-2 INJECTION INTRAMUSCULAR; INTRAVENOUS PRN
Status: DISCONTINUED | OUTPATIENT
Start: 2024-04-29 | End: 2024-04-29 | Stop reason: HOSPADM

## 2024-04-29 RX ORDER — HYDRALAZINE HYDROCHLORIDE 20 MG/ML
10 INJECTION INTRAMUSCULAR; INTRAVENOUS EVERY 6 HOURS PRN
Status: DISCONTINUED | OUTPATIENT
Start: 2024-04-29 | End: 2024-04-29 | Stop reason: HOSPADM

## 2024-04-29 RX ORDER — ONDANSETRON 2 MG/ML
4 INJECTION INTRAMUSCULAR; INTRAVENOUS PRN
Status: DISCONTINUED | OUTPATIENT
Start: 2024-04-29 | End: 2024-04-29 | Stop reason: HOSPADM

## 2024-04-29 RX ORDER — SODIUM CHLORIDE 9 MG/ML
500 INJECTION, SOLUTION INTRAVENOUS
Status: DISCONTINUED | OUTPATIENT
Start: 2024-04-29 | End: 2024-04-29 | Stop reason: HOSPADM

## 2024-04-29 RX ADMIN — FENTANYL CITRATE 50 MCG: 50 INJECTION, SOLUTION INTRAMUSCULAR; INTRAVENOUS at 08:22

## 2024-04-29 RX ADMIN — FENTANYL CITRATE 25 MCG: 50 INJECTION, SOLUTION INTRAMUSCULAR; INTRAVENOUS at 08:26

## 2024-04-29 RX ADMIN — MIDAZOLAM HYDROCHLORIDE 1 MG: 2 INJECTION, SOLUTION INTRAMUSCULAR; INTRAVENOUS at 08:25

## 2024-04-29 RX ADMIN — MIDAZOLAM HYDROCHLORIDE 2 MG: 2 INJECTION, SOLUTION INTRAMUSCULAR; INTRAVENOUS at 08:22

## 2024-04-29 ASSESSMENT — FIBROSIS 4 INDEX: FIB4 SCORE: 1.72

## 2024-04-29 NOTE — DISCHARGE INSTRUCTIONS
Discharge Instructions:    Transesophageal Echocardiogram  Transesophageal echocardiogram (GINETTE) is a test that uses sound waves to take pictures of your heart. GINETTE is done by passing a small probe attached to a flexible tube down the part of the body that moves food from your mouth to your stomach (esophagus). The pictures give clear images of your heart. This can help your doctor see if there are problems with your heart.  Tell a doctor about:  Any allergies you have.  All medicines you are taking. This includes vitamins, herbs, eye drops, creams, and over-the-counter medicines.  Any problems you or family members have had with anesthetic medicines.  Any blood disorders you have.  Any surgeries you have had.  Any medical conditions you have.  Any swallowing problems.  Whether you have or have had a blockage in the part of the body that moves food from your mouth to your stomach.  Whether you are pregnant or may be pregnant.  What are the risks?  In general, this is a safe procedure. But, problems may occur, such as:  Damage to nearby structures or organs.  A tear in the part of the body that moves food from your mouth to your stomach.  Irregular heartbeat.  Hoarse voice or trouble swallowing.  Bleeding.  What happens before the procedure?  Medicines  Ask your doctor about changing or stopping:  Your normal medicines.  Vitamins, herbs, and supplements.  Over-the-counter medicines.  Do not take aspirin or ibuprofen unless you are told to.  General instructions  Follow instructions from your doctor about what you cannot eat or drink.  You will take out any dentures or dental retainers.  Plan to have a responsible adult take you home from the hospital or clinic.  Plan to have a responsible adult care for you for the time you are told after you leave the hospital or clinic. This is important.  What happens during the procedure?  An IV will be put into one of your veins.  You may be given:  A sedative. This medicine  helps you relax.  A medicine to numb the back of your throat. This may be sprayed or gargled.  Your blood pressure, heart rate, and breathing will be watched.  You may be asked to lie on your left side.  A bite block will be placed in your mouth. This keeps you from biting the tube.  The tip of the probe will be placed into the back of your mouth.  You will be asked to swallow.  Your doctor will take pictures of your heart.  The probe and bite block will be taken out after the test is done.  The procedure may vary among doctors and hospitals.  What can I expect after the procedure?  You will be monitored until you leave the hospital or clinic. This includes checking your blood pressure, heart rate, breathing rate, and blood oxygen level.  Your throat may feel sore and numb. This will get better over time. You will not be allowed to eat or drink until the numbness has gone away.  It is common to have a sore throat for a day or two.  It is up to you to get the results of your procedure. Ask how to get your results when they are ready.  Follow these instructions at home:  If you were given a sedative during your procedure, do not drive or use machines until your doctor says that it is safe.  Return to your normal activities when your doctor says that it is safe.  Keep all follow-up visits.  Summary  GINETTE is a test that uses sound waves to take pictures of your heart.  You will be given a medicine to help you relax.  Do not drive or use machines until your doctor says that it is safe.  This information is not intended to replace advice given to you by your health care provider. Make sure you discuss any questions you have with your health care provider.  Document Revised: 08/31/2022 Document Reviewed: 08/10/2021  Elsevier Patient Education © 2023 [employer]

## 2024-04-29 NOTE — PROCEDURES
Conscious sedation was supervised by myself and administered by trained personnel using fentanyl and versed between 822 and 845. The patient tolerated sedation without complication.

## 2024-04-29 NOTE — OR NURSING
0850: Pt arrived via gurney with RN. Report received. See flowsheet for VS. Orders reviewed and initiated.   0910: Family at bedside.   0950: Discharge education provided. Patient and family express understanding. No patient distress. VSS. Pt ambulated with steady gait. Tolerating fluids. PIV removed. Pt discharged via wheelchair with RN. All personal belongings present.

## 2024-05-08 ENCOUNTER — OFFICE VISIT (OUTPATIENT)
Dept: CARDIOLOGY | Facility: MEDICAL CENTER | Age: 84
End: 2024-05-08
Attending: INTERNAL MEDICINE
Payer: MEDICARE

## 2024-05-08 VITALS
BODY MASS INDEX: 24.48 KG/M2 | HEIGHT: 70 IN | OXYGEN SATURATION: 96 % | WEIGHT: 171 LBS | DIASTOLIC BLOOD PRESSURE: 72 MMHG | HEART RATE: 78 BPM | SYSTOLIC BLOOD PRESSURE: 124 MMHG | RESPIRATION RATE: 16 BRPM

## 2024-05-08 DIAGNOSIS — I34.0 NONRHEUMATIC MITRAL VALVE REGURGITATION: ICD-10-CM

## 2024-05-08 DIAGNOSIS — I48.11 LONGSTANDING PERSISTENT ATRIAL FIBRILLATION (HCC): Chronic | ICD-10-CM

## 2024-05-08 DIAGNOSIS — E11.9 TYPE 2 DIABETES MELLITUS WITHOUT COMPLICATION, WITHOUT LONG-TERM CURRENT USE OF INSULIN (HCC): Chronic | ICD-10-CM

## 2024-05-08 PROCEDURE — 3078F DIAST BP <80 MM HG: CPT | Performed by: INTERNAL MEDICINE

## 2024-05-08 PROCEDURE — 99204 OFFICE O/P NEW MOD 45 MIN: CPT | Performed by: INTERNAL MEDICINE

## 2024-05-08 PROCEDURE — 3074F SYST BP LT 130 MM HG: CPT | Performed by: INTERNAL MEDICINE

## 2024-05-08 ASSESSMENT — FIBROSIS 4 INDEX: FIB4 SCORE: 1.72

## 2024-05-08 NOTE — PROGRESS NOTES
Interventional cardiology Initial Consultation Note      Chief Complaint: Mitral, tricuspid regurgitation    David Owen is a 83 y.o. male  patient presented today to establish care for valvular regurgitation.  He has longstanding atrial fibrillation, previous echocardiogram showed severe tricuspid regurgitation, moderate mitral regurgitation.  More recently he was having dyspnea on exertion, he underwent transesophageal echocardiogram, right heart catheterization, he is here to discuss the results.      Past Medical History:   Diagnosis Date    Arrhythmia 11/01/2023    a fib    Arthritis 11/01/2023    hands    Breath shortness     Cataract 2022    bilateral IOL    Diabetes (HCC) 2023    Disorder of thyroid     High cholesterol     Hypertension     Pneumonia              Current Outpatient Medications   Medication Sig Dispense Refill    ELIQUIS 5 MG Tab Take 1 Tablet by mouth 2 times a day. 180 Tablet 1    ferrous sulfate 325 (65 Fe) MG tablet Take 1 Tablet by mouth every day. Only taking 3 times a week 100 Tablet 1    furosemide (LASIX) 20 MG Tab TAKE 1 TABLET BY MOUTH EVERY  Tablet 3    Empagliflozin (JARDIANCE) 10 MG Tab tablet Take 1 Tablet by mouth every day. 100 Tablet 1    FreeStyle Lancets Misc USE AS DIRECTED ONCE DAILY 100 Each 3    levothyroxine (SYNTHROID) 300 MCG Tab Take 300 mcg by mouth every evening.      Non Formulary Request Joint supplement 2 tabs BID po.      rosuvastatin (CRESTOR) 20 MG Tab Take 1 Tablet by mouth every evening. 100 Tablet 3    metoprolol tartrate (LOPRESSOR) 50 MG Tab Take 1 Tablet by mouth 2 times a day. 200 Tablet 3    FREESTYLE LITE strip USE AS DIRECTED TO CHECK BLOOD SUGAR ONCE DAILY      metformin (GLUCOPHAGE) 1000 MG tablet Take 1 Tablet by mouth 2 times a day with meals. 200 Tablet 1    Coral Calcium 1000 (390 Ca) MG Tab Take 1,000 mg by mouth 2 times a day.      thyroid (ARMOUR THYROID) 120 MG Tab Take 1 Tablet by mouth every day. 30 Tablet 2     "zinc sulfate (ZINCATE) 220 (50 Zn) MG Cap Take 50 mg by mouth every evening.      ascorbic acid (VITAMIN C) 500 MG tablet Take 500 mg by mouth 2 times a day.      vitamin D3 (CHOLECALCIFEROL) 1000 Unit (25 mcg) Tab Take 5,000 Units by mouth 2 (two) times a day.       No current facility-administered medications for this visit.             Physical Exam:  Ambulatory Vitals  /72 (BP Location: Left arm, Patient Position: Sitting, BP Cuff Size: Adult)   Pulse 78   Resp 16   Ht 1.778 m (5' 10\")   Wt 77.6 kg (171 lb)   SpO2 96%    Oxygen Therapy:  Pulse Oximetry: 96 %  BP Readings from Last 4 Encounters:   05/08/24 124/72   04/29/24 116/75   04/11/24 112/70   04/05/24 118/77       Weight/BMI: Body mass index is 24.54 kg/m².  Wt Readings from Last 4 Encounters:   05/08/24 77.6 kg (171 lb)   04/29/24 77.9 kg (171 lb 11.8 oz)   04/11/24 77.1 kg (170 lb)   04/05/24 79.1 kg (174 lb 6.4 oz)           General: Well appearing and in no apparent distress  Neck: carotid bruits absent  Lungs: respiratory sounds  normal  Heart: Irregularly irregular  No palpable thrills on palpation, murmurs present, no rubs,   Lower extremity edema absent.       Recent right heart catheterization numbers reviewed, normal filling pressures.    GINETTE performed in April 2024 reviewed, independently interpreted, moderate tricuspid regurgitation, 3+ moderate to severe mitral regurgitation.      Medical Decision Making:  Problem List Items Addressed This Visit       Longstanding persistent atrial fibrillation (HCC) (Chronic)    Type 2 diabetes mellitus without complication (HCC) (Chronic)     Other Visit Diagnoses       Nonrheumatic mitral valve regurgitation        Relevant Orders    EC-ECHOCARDIOGRAM COMPLETE W/O CONT          Most recent GINETTE his tricuspid regurgitation improved, does not need intervention at this time.  However mitral regurgitation is moderate to severe.  He has been feeling decently well, his dyspnea on exertion is episodic, " he has good days most of the time.  His activity is limited by his hernia which is painful when he walks.  He is scheduled to have that fixed later this month.  At this time we decided to continue medical therapy, wait full watching.  Symptomatology of valvular regurgitation discussed, we will plan to follow-up in 6 months, advised to get in touch with me sooner if he has more symptoms.    6-month follow-up.    This note was dictated using Dragon speech recognition software.    Jamir SILVA  Interventional cardiologist  [facility] [[email or web address], [facility]  [address] [address]  Phone: [phone]  Fax: [phone]

## 2024-05-23 ENCOUNTER — APPOINTMENT (OUTPATIENT)
Dept: DERMATOLOGY | Facility: IMAGING CENTER | Age: 84
End: 2024-05-23
Payer: MEDICARE

## 2024-05-23 DIAGNOSIS — D18.01 CHERRY ANGIOMA: ICD-10-CM

## 2024-05-23 DIAGNOSIS — L57.0 ACTINIC KERATOSIS: ICD-10-CM

## 2024-05-23 DIAGNOSIS — Z12.83 SKIN CANCER SCREENING: ICD-10-CM

## 2024-05-23 DIAGNOSIS — L81.4 LENTIGINES: ICD-10-CM

## 2024-05-23 DIAGNOSIS — D22.9 MULTIPLE NEVI: ICD-10-CM

## 2024-05-23 DIAGNOSIS — L82.1 SK (SEBORRHEIC KERATOSIS): ICD-10-CM

## 2024-05-23 PROCEDURE — 17003 DESTRUCT PREMALG LES 2-14: CPT | Performed by: NURSE PRACTITIONER

## 2024-05-23 PROCEDURE — 17000 DESTRUCT PREMALG LESION: CPT | Performed by: NURSE PRACTITIONER

## 2024-05-23 PROCEDURE — 99213 OFFICE O/P EST LOW 20 MIN: CPT | Mod: 25 | Performed by: NURSE PRACTITIONER

## 2024-05-23 NOTE — PROGRESS NOTES
DERMATOLOGY NOTE  Follow up VISIT       Chief complaint: Follow-Up (CRISTY )     No new concerns today     History of skin cancer: No  History of precancers/actinic keratoses: Yes, Details: PDT tx 10/22  History of biopsies:No  History of blistering/severe sunburns:Yes, Details: child  Family history of skin cancer:No  Family history of atypical moles:No    No Known Allergies     MEDICATIONS:  Medications relevant to specialty reviewed.     REVIEW OF SYSTEMS:   Positive for skin (see HPI)  Negative for fevers and chills    EXAM:  There were no vitals taken for this visit.  Constitutional: Well-developed, well-nourished, and in no distress.     A total body skin exam was performed excluding the genitals per patient preference and including the following areas: head (including face), neck, chest, abdomen, groin/buttocks, back, bilateral upper extremities, and bilateral lower extremities with the following pertinent findings listed below and/or in assessment/plan.       Ill-defined erythematous gritty/scaly papules over the scalp/vertex and crown, forehead, temples and cheeks    -sun exposed skin of trunk and b/l upper, lower extremities and face with scattered clinically benign light brown reticulated macules all of which were morphologically similar and none of which were suspicious for skin cancer today on exam    -Several scattered 1-3mm bright red macules and thin papules on the trunk and extremities    -Multiple tan light brown skin-colored macules papules scattered over the trunk >> extremities--All with benign-appearing pigment network patterns on dermoscopy    -Several tan stuck-on waxy papules scattered on the trunk and extremities       IMPRESSION / PLAN:    1. Actinic keratosis  CRYOTHERAPY:  Risks (including, but not limited to: skin discoloration, redness, blister, blood blister, recurrence, need for further treatment, infection, scar) and benefits of cryotherapy discussed. Patient verbally agreed to proceed  with treatment. 1 cryotherapy freeze thaw cycles of 10 seconds were applied to 3 lesions on scalp as noted on exam with cryac. Patient tolerated procedure well. Aftercare instructions given--no specific care needed unless irritated during healing process, can apply Vaseline with small band-aid if needed.  Will return at his convenience for PDT on face    2. Lentigines  - Benign-appearing nature of lesions discussed during exam.   - Advised to continue to monitor for any return to clinic for new or concerning changes.      3. Cherry angioma  - Benign-appearing nature of lesions discussed during exam.   - Advised to continue to monitor for any return to clinic for new or concerning changes.      4. SK (seborrheic keratosis)  - Benign-appearing nature of lesions discussed during exam.   - Advised to continue to monitor for any return to clinic for new or concerning changes.      5. Multiple nevi  - Benign-appearing nature of lesions discussed during exam.   - Advised to continue to monitor for any return to clinic for new or concerning changes.      6. Skin cancer screening  Skin cancer education  discussed importance of sun protective clothing, eyewear in addition to the use of broad spectrum sunscreen with SPF 30 or greater, as well as need for reapplication ~every 2 hours when exposed to UVR/handout given  discussed importance following up for any new or changing lesions as noted in handout given, but every 12 months exams in clinic in the setting of dermatologic history  ABCDE's of melanoma discussed/handout given          Pt understands risks associated with LN2, Patient verbalized understanding and agrees with plan regarding the above        Please note that this dictation was created using voice recognition software. I have made every reasonable attempt to correct obvious errors, but I expect that there are errors of grammar and possibly content that I did not discover before finalizing the note.      Return to  clinic in: Return in about 1 year (around 5/23/2025) for CRISTY, PDT face at pt's convenience. and as needed for any new or changing skin lesions.

## 2024-06-18 ENCOUNTER — APPOINTMENT (OUTPATIENT)
Dept: ADMISSIONS | Facility: MEDICAL CENTER | Age: 84
End: 2024-06-18
Attending: SURGERY
Payer: MEDICARE

## 2024-06-21 ENCOUNTER — PRE-ADMISSION TESTING (OUTPATIENT)
Dept: ADMISSIONS | Facility: MEDICAL CENTER | Age: 84
End: 2024-06-21
Attending: SURGERY
Payer: MEDICARE

## 2024-06-21 ENCOUNTER — TELEPHONE (OUTPATIENT)
Dept: INTERNAL MEDICINE | Facility: OTHER | Age: 84
End: 2024-06-21
Payer: MEDICARE

## 2024-06-21 DIAGNOSIS — E11.9 TYPE 2 DIABETES MELLITUS WITHOUT COMPLICATION, UNSPECIFIED WHETHER LONG TERM INSULIN USE (HCC): ICD-10-CM

## 2024-06-21 NOTE — TELEPHONE ENCOUNTER
Patient left a vm on the front machine asking if he could get some advice. He has surgery scheduled on 06/28/24 and he wants to know if he needs to stop taking the Eliquest before the surgery?

## 2024-06-24 NOTE — TELEPHONE ENCOUNTER
Phone Number Called: [phone]    Call outcome: Left detailed message for patient. Informed to call back with any additional questions.    Message: LVM with Dr. Bauman's message. Sending pt a mychart message as well to try to see if we can contact him prior to surgery. 1st attempt

## 2024-06-25 ENCOUNTER — TELEPHONE (OUTPATIENT)
Dept: CARDIOLOGY | Facility: MEDICAL CENTER | Age: 84
End: 2024-06-25

## 2024-06-25 ENCOUNTER — PRE-ADMISSION TESTING (OUTPATIENT)
Dept: ADMISSIONS | Facility: MEDICAL CENTER | Age: 84
End: 2024-06-25
Attending: SURGERY
Payer: MEDICARE

## 2024-06-25 DIAGNOSIS — Z01.810 PRE-OPERATIVE CARDIOVASCULAR EXAMINATION: ICD-10-CM

## 2024-06-25 DIAGNOSIS — E11.9 TYPE 2 DIABETES MELLITUS WITHOUT COMPLICATION, UNSPECIFIED WHETHER LONG TERM INSULIN USE (HCC): ICD-10-CM

## 2024-06-25 DIAGNOSIS — Z01.812 PRE-OPERATIVE LABORATORY EXAMINATION: ICD-10-CM

## 2024-06-25 LAB
ANION GAP SERPL CALC-SCNC: 11 MMOL/L (ref 7–16)
BUN SERPL-MCNC: 20 MG/DL (ref 8–22)
CALCIUM SERPL-MCNC: 9.7 MG/DL (ref 8.5–10.5)
CHLORIDE SERPL-SCNC: 96 MMOL/L (ref 96–112)
CO2 SERPL-SCNC: 28 MMOL/L (ref 20–33)
CREAT SERPL-MCNC: 0.95 MG/DL (ref 0.5–1.4)
EKG IMPRESSION: NORMAL
ERYTHROCYTE [DISTWIDTH] IN BLOOD BY AUTOMATED COUNT: 61.6 FL (ref 35.9–50)
GFR SERPLBLD CREATININE-BSD FMLA CKD-EPI: 80 ML/MIN/1.73 M 2
GLUCOSE SERPL-MCNC: 306 MG/DL (ref 65–99)
HCT VFR BLD AUTO: 42.8 % (ref 42–52)
HGB BLD-MCNC: 13.6 G/DL (ref 14–18)
MCH RBC QN AUTO: 29.8 PG (ref 27–33)
MCHC RBC AUTO-ENTMCNC: 31.8 G/DL (ref 32.3–36.5)
MCV RBC AUTO: 93.9 FL (ref 81.4–97.8)
PLATELET # BLD AUTO: 190 K/UL (ref 164–446)
PMV BLD AUTO: 10.1 FL (ref 9–12.9)
POTASSIUM SERPL-SCNC: 4.8 MMOL/L (ref 3.6–5.5)
RBC # BLD AUTO: 4.56 M/UL (ref 4.7–6.1)
SODIUM SERPL-SCNC: 135 MMOL/L (ref 135–145)
WBC # BLD AUTO: 6.1 K/UL (ref 4.8–10.8)

## 2024-06-25 PROCEDURE — 80048 BASIC METABOLIC PNL TOTAL CA: CPT

## 2024-06-25 PROCEDURE — 93005 ELECTROCARDIOGRAM TRACING: CPT

## 2024-06-25 PROCEDURE — 36415 COLL VENOUS BLD VENIPUNCTURE: CPT

## 2024-06-25 PROCEDURE — 93010 ELECTROCARDIOGRAM REPORT: CPT | Performed by: INTERNAL MEDICINE

## 2024-06-25 PROCEDURE — 85027 COMPLETE CBC AUTOMATED: CPT

## 2024-06-25 NOTE — OR NURSING
Pre Admit BMP and EKG results faxed to Dr. Watson on 6/25/24 at 13:22.  Fax confirmation received.

## 2024-06-25 NOTE — TELEPHONE ENCOUNTER
CODY lambert/ [place]/:   Pt needs Metformin; has been requesting it from Cardiology office. Please refill. Thanks!      ====================  Phone Number Called: [phone]    Call outcome: Spoke to patient regarding message below.    Message: Called to advise medication to be refilled by PCP and message being sent to the team. Pt appreciative of phone call

## 2024-06-25 NOTE — TELEPHONE ENCOUNTER
Please advise patient that metformin was refilled for him.  He will also need to reestablish with a new provider in our clinic.  Seems like he has an appointment coming up on 6/28 with Dr. Anderson.

## 2024-06-25 NOTE — TELEPHONE ENCOUNTER
AK    Caller: David Owen    Topic/issue: Patient calling about his refill request for metformin (GLUCOPHAGE) 1000 MG tablet . Wants to make sure we take care of it today if possible, patient has been out of the medication for over a week.     Callback Number: [phone]    Thank you,  Radha VENEGAS

## 2024-06-26 NOTE — TELEPHONE ENCOUNTER
Phone Number Called.[phone] (home)      Call outcome: Did not leave a detailed message. Requested patient to call back.    Message: I called patient line busy.

## 2024-06-28 ENCOUNTER — HOSPITAL ENCOUNTER (OUTPATIENT)
Facility: MEDICAL CENTER | Age: 84
End: 2024-06-28
Attending: SURGERY | Admitting: SURGERY
Payer: MEDICARE

## 2024-06-28 ENCOUNTER — ANESTHESIA (OUTPATIENT)
Dept: SURGERY | Facility: MEDICAL CENTER | Age: 84
End: 2024-06-28
Payer: MEDICARE

## 2024-06-28 ENCOUNTER — PHARMACY VISIT (OUTPATIENT)
Dept: PHARMACY | Facility: MEDICAL CENTER | Age: 84
End: 2024-06-28
Payer: COMMERCIAL

## 2024-06-28 ENCOUNTER — APPOINTMENT (OUTPATIENT)
Dept: RADIOLOGY | Facility: MEDICAL CENTER | Age: 84
End: 2024-06-28
Attending: STUDENT IN AN ORGANIZED HEALTH CARE EDUCATION/TRAINING PROGRAM
Payer: MEDICARE

## 2024-06-28 ENCOUNTER — ANESTHESIA EVENT (OUTPATIENT)
Dept: SURGERY | Facility: MEDICAL CENTER | Age: 84
End: 2024-06-28
Payer: MEDICARE

## 2024-06-28 VITALS
SYSTOLIC BLOOD PRESSURE: 120 MMHG | DIASTOLIC BLOOD PRESSURE: 72 MMHG | WEIGHT: 169.09 LBS | HEART RATE: 110 BPM | BODY MASS INDEX: 25.04 KG/M2 | TEMPERATURE: 96.8 F | OXYGEN SATURATION: 92 % | HEIGHT: 69 IN | RESPIRATION RATE: 16 BRPM

## 2024-06-28 DIAGNOSIS — R10.31 RIGHT INGUINAL PAIN: ICD-10-CM

## 2024-06-28 LAB
BASE EXCESS BLDA CALC-SCNC: -3 MMOL/L (ref -4–3)
BODY TEMPERATURE: 36.1 CENTIGRADE
GLUCOSE BLD STRIP.AUTO-MCNC: 178 MG/DL (ref 65–99)
HCO3 BLDA-SCNC: 21 MMOL/L (ref 17–25)
INHALED O2 FLOW RATE: 0.5 L/MIN
PCO2 BLDA: 34.8 MMHG (ref 26–37)
PCO2 TEMP ADJ BLDA: 33.5 MMHG (ref 26–37)
PH BLDA: 7.4 [PH] (ref 7.4–7.5)
PH TEMP ADJ BLDA: 7.41 [PH] (ref 7.4–7.5)
PO2 BLDA: 79 MMHG (ref 64–87)
PO2 TEMP ADJ BLDA: 74.4 MMHG (ref 64–87)
SAO2 % BLDA: 94.2 % (ref 93–99)

## 2024-06-28 PROCEDURE — 160035 HCHG PACU - 1ST 60 MINS PHASE I: Performed by: SURGERY

## 2024-06-28 PROCEDURE — 160036 HCHG PACU - EA ADDL 30 MINS PHASE I: Performed by: SURGERY

## 2024-06-28 PROCEDURE — 160047 HCHG PACU  - EA ADDL 30 MINS PHASE II: Performed by: SURGERY

## 2024-06-28 PROCEDURE — 700101 HCHG RX REV CODE 250: Performed by: SURGERY

## 2024-06-28 PROCEDURE — 82962 GLUCOSE BLOOD TEST: CPT

## 2024-06-28 PROCEDURE — 71045 X-RAY EXAM CHEST 1 VIEW: CPT

## 2024-06-28 PROCEDURE — 82803 BLOOD GASES ANY COMBINATION: CPT

## 2024-06-28 PROCEDURE — 502714 HCHG ROBOTIC SURGERY SERVICES: Performed by: SURGERY

## 2024-06-28 PROCEDURE — 160046 HCHG PACU - 1ST 60 MINS PHASE II: Performed by: SURGERY

## 2024-06-28 PROCEDURE — 700111 HCHG RX REV CODE 636 W/ 250 OVERRIDE (IP): Mod: JZ | Performed by: STUDENT IN AN ORGANIZED HEALTH CARE EDUCATION/TRAINING PROGRAM

## 2024-06-28 PROCEDURE — 160042 HCHG SURGERY MINUTES - EA ADDL 1 MIN LEVEL 5: Performed by: SURGERY

## 2024-06-28 PROCEDURE — RXMED WILLOW AMBULATORY MEDICATION CHARGE: Performed by: SURGERY

## 2024-06-28 PROCEDURE — A9270 NON-COVERED ITEM OR SERVICE: HCPCS | Performed by: STUDENT IN AN ORGANIZED HEALTH CARE EDUCATION/TRAINING PROGRAM

## 2024-06-28 PROCEDURE — 160048 HCHG OR STATISTICAL LEVEL 1-5: Performed by: SURGERY

## 2024-06-28 PROCEDURE — C1781 MESH (IMPLANTABLE): HCPCS | Performed by: SURGERY

## 2024-06-28 PROCEDURE — 160009 HCHG ANES TIME/MIN: Performed by: SURGERY

## 2024-06-28 PROCEDURE — 700105 HCHG RX REV CODE 258: Performed by: SURGERY

## 2024-06-28 PROCEDURE — 700111 HCHG RX REV CODE 636 W/ 250 OVERRIDE (IP): Performed by: SURGERY

## 2024-06-28 PROCEDURE — 700101 HCHG RX REV CODE 250: Performed by: STUDENT IN AN ORGANIZED HEALTH CARE EDUCATION/TRAINING PROGRAM

## 2024-06-28 PROCEDURE — 700102 HCHG RX REV CODE 250 W/ 637 OVERRIDE(OP): Performed by: STUDENT IN AN ORGANIZED HEALTH CARE EDUCATION/TRAINING PROGRAM

## 2024-06-28 PROCEDURE — 160031 HCHG SURGERY MINUTES - 1ST 30 MINS LEVEL 5: Performed by: SURGERY

## 2024-06-28 PROCEDURE — 160025 RECOVERY II MINUTES (STATS): Performed by: SURGERY

## 2024-06-28 PROCEDURE — 160002 HCHG RECOVERY MINUTES (STAT): Performed by: SURGERY

## 2024-06-28 DEVICE — MESH 3D MAX RIGHT 10.8 X 16CM - LARGE (1EA/CA): Type: IMPLANTABLE DEVICE | Site: ABDOMEN | Status: FUNCTIONAL

## 2024-06-28 RX ORDER — BUPIVACAINE HYDROCHLORIDE AND EPINEPHRINE 5; 5 MG/ML; UG/ML
INJECTION, SOLUTION PERINEURAL
Status: DISCONTINUED | OUTPATIENT
Start: 2024-06-28 | End: 2024-06-28 | Stop reason: HOSPADM

## 2024-06-28 RX ORDER — POLYETHYLENE GLYCOL 3350 17 G/17G
17 POWDER, FOR SOLUTION ORAL
Qty: 14 EACH | Refills: 0 | Status: SHIPPED | OUTPATIENT
Start: 2024-06-28 | End: 2024-07-12

## 2024-06-28 RX ORDER — ROCURONIUM BROMIDE 10 MG/ML
INJECTION, SOLUTION INTRAVENOUS PRN
Status: DISCONTINUED | OUTPATIENT
Start: 2024-06-28 | End: 2024-06-28 | Stop reason: SURG

## 2024-06-28 RX ORDER — ACETAMINOPHEN 500 MG
1000 TABLET ORAL EVERY 6 HOURS PRN
COMMUNITY

## 2024-06-28 RX ORDER — METHOCARBAMOL 500 MG/1
1000 TABLET, FILM COATED ORAL 3 TIMES DAILY PRN
Qty: 60 TABLET | Refills: 0 | Status: SHIPPED | OUTPATIENT
Start: 2024-06-28

## 2024-06-28 RX ORDER — ONDANSETRON 2 MG/ML
INJECTION INTRAMUSCULAR; INTRAVENOUS PRN
Status: DISCONTINUED | OUTPATIENT
Start: 2024-06-28 | End: 2024-06-28 | Stop reason: SURG

## 2024-06-28 RX ORDER — OXYCODONE HCL 5 MG/5 ML
5 SOLUTION, ORAL ORAL
Status: COMPLETED | OUTPATIENT
Start: 2024-06-28 | End: 2024-06-28

## 2024-06-28 RX ORDER — EPHEDRINE SULFATE 50 MG/ML
5 INJECTION, SOLUTION INTRAVENOUS
Status: DISCONTINUED | OUTPATIENT
Start: 2024-06-28 | End: 2024-06-28 | Stop reason: HOSPADM

## 2024-06-28 RX ORDER — CEFAZOLIN SODIUM 1 G/3ML
INJECTION, POWDER, FOR SOLUTION INTRAMUSCULAR; INTRAVENOUS PRN
Status: DISCONTINUED | OUTPATIENT
Start: 2024-06-28 | End: 2024-06-28 | Stop reason: SURG

## 2024-06-28 RX ORDER — HYDRALAZINE HYDROCHLORIDE 20 MG/ML
5 INJECTION INTRAMUSCULAR; INTRAVENOUS
Status: DISCONTINUED | OUTPATIENT
Start: 2024-06-28 | End: 2024-06-28 | Stop reason: HOSPADM

## 2024-06-28 RX ORDER — ONDANSETRON 2 MG/ML
4 INJECTION INTRAMUSCULAR; INTRAVENOUS
Status: DISCONTINUED | OUTPATIENT
Start: 2024-06-28 | End: 2024-06-28 | Stop reason: HOSPADM

## 2024-06-28 RX ORDER — IBUPROFEN 200 MG
200 TABLET ORAL EVERY 8 HOURS PRN
COMMUNITY

## 2024-06-28 RX ORDER — HYDROMORPHONE HYDROCHLORIDE 1 MG/ML
0.1 INJECTION, SOLUTION INTRAMUSCULAR; INTRAVENOUS; SUBCUTANEOUS
Status: DISCONTINUED | OUTPATIENT
Start: 2024-06-28 | End: 2024-06-28 | Stop reason: HOSPADM

## 2024-06-28 RX ORDER — IPRATROPIUM BROMIDE AND ALBUTEROL SULFATE 2.5; .5 MG/3ML; MG/3ML
3 SOLUTION RESPIRATORY (INHALATION)
Status: DISCONTINUED | OUTPATIENT
Start: 2024-06-28 | End: 2024-06-28 | Stop reason: HOSPADM

## 2024-06-28 RX ORDER — HALOPERIDOL 5 MG/ML
1 INJECTION INTRAMUSCULAR
Status: DISCONTINUED | OUTPATIENT
Start: 2024-06-28 | End: 2024-06-28 | Stop reason: HOSPADM

## 2024-06-28 RX ORDER — HYDROMORPHONE HYDROCHLORIDE 1 MG/ML
0.4 INJECTION, SOLUTION INTRAMUSCULAR; INTRAVENOUS; SUBCUTANEOUS
Status: DISCONTINUED | OUTPATIENT
Start: 2024-06-28 | End: 2024-06-28 | Stop reason: HOSPADM

## 2024-06-28 RX ORDER — LABETALOL HYDROCHLORIDE 5 MG/ML
5 INJECTION, SOLUTION INTRAVENOUS
Status: DISCONTINUED | OUTPATIENT
Start: 2024-06-28 | End: 2024-06-28 | Stop reason: HOSPADM

## 2024-06-28 RX ORDER — HYDROMORPHONE HYDROCHLORIDE 1 MG/ML
0.2 INJECTION, SOLUTION INTRAMUSCULAR; INTRAVENOUS; SUBCUTANEOUS
Status: DISCONTINUED | OUTPATIENT
Start: 2024-06-28 | End: 2024-06-28 | Stop reason: HOSPADM

## 2024-06-28 RX ORDER — SODIUM CHLORIDE, SODIUM LACTATE, POTASSIUM CHLORIDE, CALCIUM CHLORIDE 600; 310; 30; 20 MG/100ML; MG/100ML; MG/100ML; MG/100ML
INJECTION, SOLUTION INTRAVENOUS CONTINUOUS
Status: DISCONTINUED | OUTPATIENT
Start: 2024-06-28 | End: 2024-06-28 | Stop reason: HOSPADM

## 2024-06-28 RX ORDER — PHENYLEPHRINE HYDROCHLORIDE 10 MG/ML
INJECTION, SOLUTION INTRAMUSCULAR; INTRAVENOUS; SUBCUTANEOUS PRN
Status: DISCONTINUED | OUTPATIENT
Start: 2024-06-28 | End: 2024-06-28 | Stop reason: SURG

## 2024-06-28 RX ORDER — DEXAMETHASONE SODIUM PHOSPHATE 4 MG/ML
INJECTION, SOLUTION INTRA-ARTICULAR; INTRALESIONAL; INTRAMUSCULAR; INTRAVENOUS; SOFT TISSUE PRN
Status: DISCONTINUED | OUTPATIENT
Start: 2024-06-28 | End: 2024-06-28 | Stop reason: SURG

## 2024-06-28 RX ORDER — DIPHENHYDRAMINE HYDROCHLORIDE 50 MG/ML
12.5 INJECTION INTRAMUSCULAR; INTRAVENOUS
Status: DISCONTINUED | OUTPATIENT
Start: 2024-06-28 | End: 2024-06-28 | Stop reason: HOSPADM

## 2024-06-28 RX ORDER — HEPARIN SODIUM 5000 [USP'U]/ML
5000 INJECTION, SOLUTION INTRAVENOUS; SUBCUTANEOUS
Status: COMPLETED | OUTPATIENT
Start: 2024-06-28 | End: 2024-06-28

## 2024-06-28 RX ORDER — OXYCODONE HYDROCHLORIDE AND ACETAMINOPHEN 5; 325 MG/1; MG/1
1 TABLET ORAL EVERY 6 HOURS PRN
Qty: 25 TABLET | Refills: 0 | Status: SHIPPED | OUTPATIENT
Start: 2024-06-28 | End: 2024-07-05

## 2024-06-28 RX ORDER — OXYCODONE HCL 5 MG/5 ML
10 SOLUTION, ORAL ORAL
Status: COMPLETED | OUTPATIENT
Start: 2024-06-28 | End: 2024-06-28

## 2024-06-28 RX ORDER — SODIUM CHLORIDE, SODIUM LACTATE, POTASSIUM CHLORIDE, CALCIUM CHLORIDE 600; 310; 30; 20 MG/100ML; MG/100ML; MG/100ML; MG/100ML
INJECTION, SOLUTION INTRAVENOUS CONTINUOUS
Status: DISCONTINUED | OUTPATIENT
Start: 2024-06-28 | End: 2024-06-28

## 2024-06-28 RX ORDER — LIDOCAINE HYDROCHLORIDE 20 MG/ML
INJECTION, SOLUTION EPIDURAL; INFILTRATION; INTRACAUDAL; PERINEURAL PRN
Status: DISCONTINUED | OUTPATIENT
Start: 2024-06-28 | End: 2024-06-28 | Stop reason: SURG

## 2024-06-28 RX ADMIN — PHENYLEPHRINE HYDROCHLORIDE 100 MCG: 10 INJECTION INTRAVENOUS at 08:15

## 2024-06-28 RX ADMIN — FENTANYL CITRATE 25 MCG: 50 INJECTION, SOLUTION INTRAMUSCULAR; INTRAVENOUS at 10:47

## 2024-06-28 RX ADMIN — SODIUM CHLORIDE, POTASSIUM CHLORIDE, SODIUM LACTATE AND CALCIUM CHLORIDE: 600; 310; 30; 20 INJECTION, SOLUTION INTRAVENOUS at 07:35

## 2024-06-28 RX ADMIN — OXYCODONE HYDROCHLORIDE 5 MG: 5 SOLUTION ORAL at 09:49

## 2024-06-28 RX ADMIN — FENTANYL CITRATE 25 MCG: 50 INJECTION, SOLUTION INTRAMUSCULAR; INTRAVENOUS at 09:02

## 2024-06-28 RX ADMIN — FENTANYL CITRATE 50 MCG: 50 INJECTION, SOLUTION INTRAMUSCULAR; INTRAVENOUS at 10:24

## 2024-06-28 RX ADMIN — FENTANYL CITRATE 25 MCG: 50 INJECTION, SOLUTION INTRAMUSCULAR; INTRAVENOUS at 10:10

## 2024-06-28 RX ADMIN — PROPOFOL 50 MG: 10 INJECTION, EMULSION INTRAVENOUS at 09:21

## 2024-06-28 RX ADMIN — PROPOFOL 100 MG: 10 INJECTION, EMULSION INTRAVENOUS at 08:15

## 2024-06-28 RX ADMIN — HEPARIN SODIUM 5000 UNITS: 5000 INJECTION, SOLUTION INTRAVENOUS; SUBCUTANEOUS at 07:35

## 2024-06-28 RX ADMIN — FENTANYL CITRATE 50 MCG: 50 INJECTION, SOLUTION INTRAMUSCULAR; INTRAVENOUS at 08:12

## 2024-06-28 RX ADMIN — ONDANSETRON 4 MG: 2 INJECTION INTRAMUSCULAR; INTRAVENOUS at 09:21

## 2024-06-28 RX ADMIN — SUGAMMADEX 200 MG: 100 INJECTION, SOLUTION INTRAVENOUS at 09:21

## 2024-06-28 RX ADMIN — FENTANYL CITRATE 25 MCG: 50 INJECTION, SOLUTION INTRAMUSCULAR; INTRAVENOUS at 08:43

## 2024-06-28 RX ADMIN — LIDOCAINE HYDROCHLORIDE 100 MG: 20 INJECTION, SOLUTION EPIDURAL; INFILTRATION; INTRACAUDAL at 08:15

## 2024-06-28 RX ADMIN — ROCURONIUM BROMIDE 70 MG: 50 INJECTION, SOLUTION INTRAVENOUS at 08:17

## 2024-06-28 RX ADMIN — DEXAMETHASONE SODIUM PHOSPHATE 4 MG: 4 INJECTION INTRA-ARTICULAR; INTRALESIONAL; INTRAMUSCULAR; INTRAVENOUS; SOFT TISSUE at 08:19

## 2024-06-28 RX ADMIN — CEFAZOLIN 2 G: 1 INJECTION, POWDER, FOR SOLUTION INTRAMUSCULAR; INTRAVENOUS at 08:15

## 2024-06-28 ASSESSMENT — PAIN DESCRIPTION - PAIN TYPE
TYPE: ACUTE PAIN;SURGICAL PAIN
TYPE: SURGICAL PAIN
TYPE: ACUTE PAIN;SURGICAL PAIN
TYPE: SURGICAL PAIN

## 2024-06-28 ASSESSMENT — FIBROSIS 4 INDEX: FIB4 SCORE: 1.83

## 2024-06-28 NOTE — ANESTHESIA POSTPROCEDURE EVALUATION
Patient: David Hinson Hirschwanrmarah    Procedure Summary       Date: 06/28/24 Room / Location: [place] [address] / SURGERY [facility]    Anesthesia Start: 0812 Anesthesia Stop: 0945    Procedure: ROBOTIC RIGHT INGUINAL HERNIA REPAIR WITH MESH (Right: Abdomen) Diagnosis: (RIGHT INGUINAL HERNIA)    Surgeons: Barrie Watson M.D. Responsible Provider: Delvis Groves D.O.    Anesthesia Type: general ASA Status: 3            Final Anesthesia Type: general  Last vitals  BP   Blood Pressure : 130/64    Temp   35.8 °C (96.5 °F)    Pulse   73   Resp   14    SpO2   100 %      Anesthesia Post Evaluation    Patient location during evaluation: PACU  Patient participation: complete - patient participated  Level of consciousness: awake and alert    Airway patency: patent  Anesthetic complications: no  Cardiovascular status: hemodynamically stable  Respiratory status: acceptable  Hydration status: euvolemic    PONV: none          No notable events documented.     Nurse Pain Score: 5 (NPRS)

## 2024-06-28 NOTE — PROGRESS NOTES
1134 Patient to stage two from PACU, AOx$, states pain is tolerable, in the 70's on RA. Put on 2L. Patient wife Karly at bedside. RN instructed patient to take deep breaths.       1215 All DC instructions given to patient and spouse, they demonstrated understanding, DC packet given.     1230 Patent dressed and wants to go home, unable to ween from 02. Using incentive spirometer getting to 2000.     1330 RN notified MD of patient status and unable to ween 02.      Patient in the 70s-80's on room air, Rn has walked patient, he  works with incentive spirometer and gets to 2000, deep breathing exercises in place, no narcotics given in phase 2. Patient unable to get sats up. Rn called MD, and received an order or a chest xray and duo neb treatment.     1444 patient given duo neb treatment, sating 90-91% on RA. Chest xray looks good. MD aware. ABG drawn. VSS.     1452 Patient sating in the 90's. Ready to DC. Waiting for the ABG results.

## 2024-06-28 NOTE — OR NURSING
Patient recovered well in post-op. AAOx4. VSS, on RA. Surgical sites CDI, pink x4 sites. Abdomen soft, tender. Surgical pain managed through PO and IV medications. Patient tolerating oral fluids without nausea. Spouse updated and discussed POC. No belongings in pacu. Report called to KIRBY Lam. Patient transported to phase II w/ RN.

## 2024-06-28 NOTE — ANESTHESIA PROCEDURE NOTES
Airway    Date/Time: 6/28/2024 8:18 AM    Performed by: Delvis Groves D.O.  Authorized by: Delvis Groves D.O.    Location:  OR  Urgency:  Elective  Difficult Airway: No    Indications for Airway Management:  Anesthesia      Spontaneous Ventilation: absent    Sedation Level:  Deep  Preoxygenated: Yes    Patient Position:  Sniffing  Final Airway Type:  Endotracheal airway  Final Endotracheal Airway:  ETT  Cuffed: Yes    Technique Used for Successful ETT Placement:  Direct laryngoscopy    Insertion Site:  Oral  Blade Type:  Spencer  Laryngoscope Blade/Videolaryngoscope Blade Size:  3  ETT Size (mm):  7.5  Measured from:  Teeth  ETT to Teeth (cm):  21  Placement Verified by: auscultation and capnometry    Cormack-Lehane Classification:  Grade IIb - view of arytenoids or posterior of glottis only  Number of Attempts at Approach:  1

## 2024-06-28 NOTE — ANESTHESIA TIME REPORT
Anesthesia Start and Stop Event Times       Date Time Event    6/28/2024 0725 Ready for Procedure     0812 Anesthesia Start     0945 Anesthesia Stop          Responsible Staff  06/28/24      Name Role Begin End    Delvis Groves D.O. Anesth 0812 0945          Overtime Reason:  no overtime (within assigned shift)    Comments:

## 2024-06-28 NOTE — OP REPORT
[facility]    OPERATIVE REPORT         DATE OF OPERATION: 6/28/2024      SURGEON: Barrie Watson MD MPH FACS [facility]      ASSISTANT(S): Juan J Dalal PA-C    ANESTHESIOLOGIST(S): Delvis Groves DO      ANESTHESIA: General with local anesthetic      PREOPERATIVE DIAGNOSIS: Symptomatic right inguinal hernia      POSTOPERATIVE DIAGNOSIS: Symptomatic large, right, indirect inguinal hernia      OPERATION(S) PERFORMED: Robotic right inguinal hernia repair with mesh (rTAPP)      ESTIMATED BLOOD LOSS: 10 ml      SPECIMEN(S): None      COMPLICATIONS: None      BACKGROUND: The patient is a 83 y.o. male with a symptomatic right inguinal hernia, associated with a groin bulge and pain. Mr. Owen is scheduled to undergo a robotic vs laparoscopic right inguinal hernia repair with mesh (HOLLY procedure). The procedure and its associated risks, benefits, and alternatives were discussed with the patient. All his questions were answered and he agreed to proceed with the operation.      OPERATIVE FINDINGS: During this procedure, a large-size, right, indirect inguinal hernia was identified, containing a substantial amount of incarcerated pre-peritoneal fat. There was no evidence of any direct/indirect/femoral hernias on the contralateral side. Robotic HOLLY repair was performed in a standard fashion, with mesh.     OPERATIVE PROCEDURE: The site of surgery was marked by the operating surgeon in the preoperative holding area. The patient was brought into the operating room (OR) and positioned on the operating table in the supine position. Care was taken to make sure that all the extremities and bony prominences were adequately padded. Intermittent compression devices were placed on bilateral lower extremities. The patient was given Ancef 2 g IV prior to induction of anesthesia. After successful initiation of general endotracheal anesthesia, patient’s abdominal wall was shaved, and then prepped and draped in  the usual sterile fashion. A time out was completed verifying correct patient, procedure, site, positioning, and implant prior to beginning of the operation.      A small skin incision was created just superior to the umbilicus, and the skin and umbilicus were elevated with a sharp towel clamp. A Veress needle was inserted without incident and the saline drip test was negative. Pneumoperitoneum was created with CO2 to 15 mmHg pressure without any physiologic derangements. The abdomen was then entered by using the Impermium First Entry 5-mm trocar and a 5-mm, 0-degree scope in the same location, without incident. No injuries were noted from initial or subsequent trocar placement. Two 8-mm robotic trocars were placed laterally to the rectus muscle, on the patient's right and left. Next, Mr. Owen was placed in the Trendelenburg position. Diagnostic laparoscopy demonstrated a large-size, right, indirect inguinal hernia, containing a substantial amount of incarcerated pre-peritoneal fat. There was no evidence of any direct/indirect/femoral hernias on the contralateral side. The da Kendall Xi robotic system was then successfully docked to the patient's pelvis and the remainder of this operation was performed with assistance of the robot.     Both inguinal regions were visualized and the median umbilical ligament, medial umbilical ligament, and lateral umbilical folds were identified on each side. Next, the preperitoneal space was entered about 4 cm superior to the anterior superior iliac spine on the right and the peritoneal flap was developed medially towards the medial umbilical ligament and then inferiorly towards the myopectineal space. It was further developed by exposing the inferior epigastric vessels and keeping them anterior to the dissection plane. The pubic symphysis was identified and the Roberto’s ligament was dissected laterally to its junction with the iliac vein. The dissection was continued inferiorly  to the iliopubic tract, with care taken to avoid injury to the femoral branch of the genitofemoral nerve and the lateral femoral cutaneous nerve. The cord structures were carefully dissected out. Findings were as above.      All incarcerated pre-peritoneal fat was carefully reduced out of the inguinal canal and the indirect hernia sac was mobilized from the cord structures and then reduced into the peritoneal cavity. A large (16 x 12 cm) piece of Bard 3D Max mesh was rolled longitudinally into a compact cylinder, and then passed through the camera trocar. The mesh was placed along the inferior aspect of the working space and unrolled into place to completely cover the direct, indirect, and femoral spaces. Next, it was secured into position by using interrupted 3-0 Vicryl sutures in the following locations: into the Roberto's ligament, medial to inferior epigastric bundle on the rectus muscle (superior to the iliopubic tract) and lateral to the inferior epigastric bundle on the transverse abdominis muscle. Care was taken to avoid the inferior 'triangles', containing the iliac vessels and genital nerves. The peritoneal flap was closed over the mesh and secured in place with a running 3-0 Stratafix (PDS) suture.       After ensuring adequate hemostasis using electrocautery, the insufflation was stopped and the robotic system was undocked. All trocars were removed and their skin incisions were closed using 4-0 Monocryl sutures. Dermabond was applied over each incision. The patient was then awakened from anesthesia and transferred to the recovery room in stable condition. At the conclusion of the case, the sponge, needle, and instrument counts were correct x 2.      Dr. Watson was present and scrubbed throughout the entirety of this case.

## 2024-06-28 NOTE — ANESTHESIA PREPROCEDURE EVALUATION
Case: [id number] Date/Time: 06/28/24 0800    Procedure: ROBOTIC RIGHT INGUINAL HERNIA REPAIR WITH MESH    Pre-op diagnosis: RIGHT INGUINAL HERNIA    Location: [facility] OR 15 / SURGERY [facility]    Surgeons: Barrie Watson M.D.            Relevant Problems   No relevant active problems       Physical Exam    Airway   Mallampati: II  TM distance: >3 FB  Neck ROM: full       Cardiovascular - normal exam  Rhythm: irregular  Rate: normal  (-) murmur     Dental - normal exam           Pulmonary - normal exam  Breath sounds clear to auscultation     Abdominal    Neurological - normal exam                   Anesthesia Plan    ASA 3   ASA physical status 3 criteria: a thrombophilic disease requiring anticoagulation    Plan - general       Airway plan will be ETT          Induction: intravenous    Postoperative Plan: Postoperative administration of opioids is intended.    Pertinent diagnostic labs and testing reviewed    Informed Consent:    Anesthetic plan and risks discussed with patient.    Use of blood products discussed with: patient whom consented to blood products.

## 2024-06-28 NOTE — DISCHARGE INSTRUCTIONS
HOME CARE INSTRUCTIONS    ACTIVITY: Rest and take it easy for the first 24 hours.  A responsible adult is recommended to remain with you during that time.  It is normal to feel sleepy.  We encourage you to not do anything that requires balance, judgment or coordination.    FOR 24 HOURS DO NOT:  Drive, operate machinery or run household appliances.  Drink beer or alcoholic beverages.  Make important decisions or sign legal documents.    SPECIAL INSTRUCTIONS: [facility]  Discharge Instructions for Inguinal Hernia Surgery      You just had surgery to repair an inguinal hernia caused by a weakness in the abdominal wall of your groin area.    Now that you are going home, follow the surgeon's instructions on self-care at home.    When You're in the Hospital    During surgery, you had anesthesia. This may have been general (asleep and pain-free) or spinal or epidural (numb from the waist down) anesthesia. If the hernia was small, it may have been repaired under local anesthesia (awake but pain-free).    The nurse will give you pain medicine and help you begin to move around. Rest and gentle movement are important for recovery.    You may go home the same day as surgery. Or the hospital stay may be 1 to 2 days. It will depend on the procedure that was done.    What to Expect at Home    After hernia repair:    If there are visible stitches on the skin, they will need to be removed at a follow-up visit with the surgeon. If stitches under the skin were used, they will dissolve on their own. The incisions are covered with a bandage or with a liquid adhesive (skin glue).    You may have pain, soreness, and stiffness at first, especially when moving about. This is normal.    You will also feel tired after surgery. This can last for a few weeks.    You will most likely return to normal activities in just a few weeks.    Men may have swelling and pain in their testicles.    There may be some bruising around the  groin and testicular area.    You may have trouble passing urine for the first few days.    Make sure you get plenty of rest the first 2 to 3 days after going home. Ask family and friends for help with daily activities while your movements are limited.    Managing Pain    Use any pain medicines as instructed by the surgeon or nurse. You may be given a prescription for a narcotic pain medicine. Over-the-counter pain medicine (ibuprofen, acetaminophen) can be used if the narcotic medicine is too strong.    Apply an ice pack or cold compress to the groin area for 30 to 45 minutes at a time for the first three days. This will help significantly with your pain and swelling. Wrap the compress or ice in a towel. This helps prevent cold injury to the skin.    Wound Care    There may be a bandage over the incision. Follow the surgeon's instructions for how long to leave it on and when to change it. If skin glue was used, a bandage may not have been used.    A little bleeding and drainage is normal for the first few days. Apply antibiotic ointment (bacitracin, polysporin) or another solution to the incision area if the surgeon or nurse told you to.    Wash the area with mild soap and water when the surgeon says it is OK to do so. Gently pat it dry. Do not take a bath, soak in a hot tub, or go swimming for the first week after surgery.    Diet During Recovery    Pain medicines can cause constipation. Eating some high-fiber foods and drinking plenty of water can help keep the bowels moving. Use over the counter fiber products if constipation does not improve.    Antibiotics can cause diarrhea. If this happens, try eating yogurt with live cultures or taking psyllium (Metamucil). Call the surgeon if the diarrhea does not get better.    Activity for Adults    Give yourself time to heal. You may gradually resume normal activities, such as walking, driving, and sexual activity, when you are ready. But you probably will not feel like  doing anything strenuous for a few weeks.    Do not drive if you are taking narcotic pain medicines.    Do not lift anything over 10 pounds (about a gallon of milk) for 4 weeks, or until your doctor tells you it is OK. If possible avoid doing any activity that causes pain, or pulls on the area of surgery. Older boys and men may want to wear an athletic supporter if they have swelling or pain in the testicles.    Check with the surgeon before returning to sports or other high-impact activities. Protect the incision area from the sun for 1 year to prevent noticeable scarring.    When to Call the Doctor    Call the surgeon if you have any of the following:    Difficulty breathing or chest pain (CALL 911)  Light headedness that does not go away after a few days  Severe pain or soreness that does not improve with time  Incisions are bleeding, red or warm to the touch, or have a thick, yellow, green, or pus-like drainage  Chills, or fever of 101°F (38.3°C), or higher  Swelling or pain in the testicles that is getting worse  Trouble urinating    Follow up Instructions:    Please call the [facility] (Dr. Watson's office) at [phone] to schedule your follow up appointment in 3 weeks.    Thank you for this opportunity to provide excellent surgical care for you today.      DIET: To avoid nausea, slowly advance diet as tolerated, avoiding spicy or greasy foods for the first day.  Add more substantial food to your diet according to your physician's instructions. INCREASE FLUIDS AND FIBER TO AVOID CONSTIPATION.      MEDICATIONS: Resume taking daily medication.  Take prescribed pain medication with food.  If no medication is prescribed, you may take non-aspirin pain medication if needed.  PAIN MEDICATION CAN BE VERY CONSTIPATING.  Take a stool softener or laxative such as senokot, pericolace, or milk of magnesia if needed.    Prescription given for Percocet, Robaxin, Miralax.  Last pain medication given at  9:45am (oxycodone).    A follow-up appointment should be arranged with your doctor in 1-2 weeks; call to schedule.    You should CALL YOUR PHYSICIAN if you develop:  Fever greater than 101 degrees F.  Pain not relieved by medication, or persistent nausea or vomiting.  Excessive bleeding (blood soaking through dressing) or unexpected drainage from the wound.  Extreme redness or swelling around the incision site, drainage of pus or foul smelling drainage.  Inability to urinate or empty your bladder within 8 hours.  Problems with breathing or chest pain.    You should call 911 if you develop problems with breathing or chest pain.  If you are unable to contact your doctor or surgical center, you should go to the nearest emergency room or urgent care center.  Physician's telephone #: [phone]    MILD FLU-LIKE SYMPTOMS ARE NORMAL.  YOU MAY EXPERIENCE GENERALIZED MUSCLE ACHES, THROAT IRRITATION, HEADACHE AND/OR SOME NAUSEA.    If any questions arise, call your doctor.  If your doctor is not available, please feel free to call the Surgical Center at [phone].  The Center is open Monday through Friday from 7AM to 7PM.      A registered nurse may call you a few days after your surgery to see how you are doing after your procedure.    You may also receive a survey in the mail within the next two weeks and we ask that you take a few moments to complete the survey and return it to us.  Our goal is to provide you with very good care and we value your comments.     Depression / Suicide Risk    As you are discharged from this [facility] facility, it is important to learn how to keep safe from harming yourself.    Recognize the warning signs:  Abrupt changes in personality, positive or negative- including increase in energy   Giving away possessions  Change in eating patterns- significant weight changes-  positive or negative  Change in sleeping patterns- unable to sleep or sleeping all the time   Unwillingness or  inability to communicate  Depression  Unusual sadness, discouragement and loneliness  Talk of wanting to die  Neglect of personal appearance   Rebelliousness- reckless behavior  Withdrawal from people/activities they love  Confusion- inability to concentrate     If you or a loved one observes any of these behaviors or has concerns about self-harm, here's what you can do:  Talk about it- your feelings and reasons for harming yourself  Remove any means that you might use to hurt yourself (examples: pills, rope, extension cords, firearm)  Get professional help from the community (Mental Health, Substance Abuse, psychological counseling)  Do not be alone:Call your Safe Contact- someone whom you trust who will be there for you.  Call your local CRISIS HOTLINE 059-1285 or [phone]  Call your local Children's Mobile Crisis Response Team [place] (300) 810-4860 or www.Recorded Future  Call the toll free National Suicide Prevention Hotlines   National Suicide Prevention Lifeline 514-268-NBGK (2358)  National Hope Line Network 800-SUICIDE (994-6853)    I acknowledge receipt and understanding of these Home Care instructions.

## 2024-07-25 ENCOUNTER — TELEPHONE (OUTPATIENT)
Dept: CARDIOTHORACIC SURGERY | Facility: MEDICAL CENTER | Age: 84
End: 2024-07-25

## 2024-07-25 ENCOUNTER — OFFICE VISIT (OUTPATIENT)
Dept: CARDIOLOGY | Facility: MEDICAL CENTER | Age: 84
End: 2024-07-25
Attending: INTERNAL MEDICINE
Payer: MEDICARE

## 2024-07-25 ENCOUNTER — DOCUMENTATION (OUTPATIENT)
Dept: CARDIOLOGY | Facility: MEDICAL CENTER | Age: 84
End: 2024-07-25

## 2024-07-25 VITALS
SYSTOLIC BLOOD PRESSURE: 112 MMHG | DIASTOLIC BLOOD PRESSURE: 74 MMHG | WEIGHT: 162 LBS | HEIGHT: 69 IN | OXYGEN SATURATION: 96 % | BODY MASS INDEX: 23.99 KG/M2 | RESPIRATION RATE: 18 BRPM | HEART RATE: 77 BPM

## 2024-07-25 DIAGNOSIS — I34.0 SEVERE MITRAL REGURGITATION: ICD-10-CM

## 2024-07-25 DIAGNOSIS — I48.11 LONGSTANDING PERSISTENT ATRIAL FIBRILLATION (HCC): Chronic | ICD-10-CM

## 2024-07-25 PROCEDURE — 99215 OFFICE O/P EST HI 40 MIN: CPT | Performed by: INTERNAL MEDICINE

## 2024-07-25 PROCEDURE — G2211 COMPLEX E/M VISIT ADD ON: HCPCS | Performed by: INTERNAL MEDICINE

## 2024-07-25 PROCEDURE — 99213 OFFICE O/P EST LOW 20 MIN: CPT | Performed by: INTERNAL MEDICINE

## 2024-07-25 PROCEDURE — 3078F DIAST BP <80 MM HG: CPT | Performed by: INTERNAL MEDICINE

## 2024-07-25 PROCEDURE — 3074F SYST BP LT 130 MM HG: CPT | Performed by: INTERNAL MEDICINE

## 2024-07-25 ASSESSMENT — FIBROSIS 4 INDEX: FIB4 SCORE: 1.83

## 2024-07-25 ASSESSMENT — PATIENT HEALTH QUESTIONNAIRE - PHQ9: CLINICAL INTERPRETATION OF PHQ2 SCORE: 0

## 2024-07-26 ENCOUNTER — DOCUMENTATION (OUTPATIENT)
Dept: CARDIOLOGY | Facility: MEDICAL CENTER | Age: 84
End: 2024-07-26
Payer: MEDICARE

## 2024-08-01 NOTE — PROGRESS NOTES
REFERRING PHYSICIAN: Jamir Antonio MD    CONSULTING PHYSICIAN: Laurie Anderson MD, FACS    CHIEF COMPLAINT: Shortness of breath    HISTORY OF PRESENT ILLNESS: The patient is an 83 y.o. male with past medical history of hypothyroidism, diabetes mellitus, hyperlipidemia, atrial fibrillation, moderate tricuspid regurgitation, and severe mitral regurgitation. Today, he states he has increasing shortness of breath with exertion. He has occasional lower extremity edema. He denies dizziness, chest pain, orthopnea. He can walk about 1 block before having to stop and rest.     PAST MEDICAL HISTORY:   Active Ambulatory Problems     Diagnosis Date Noted    Longstanding persistent atrial fibrillation (HCC) 07/13/2022    Type 2 diabetes mellitus without complication (HCC) 07/13/2022    Hypothyroidism 07/13/2022    BMI 25.0-25.9,adult 08/02/2023    Secondary hypercoagulable state (HCC) 08/03/2023    Moderate tricuspid regurgitation 10/25/2023    Severe mitral regurgitation 10/25/2023    Pure hypercholesterolemia 10/25/2023    Agatston coronary artery calcium score between 100 and 400 10/25/2023    Acquired circulating anticoagulants (HCC) 01/16/2024     Resolved Ambulatory Problems     Diagnosis Date Noted    Paroxysmal atrial fibrillation (HCC) 07/13/2022    Facial rash 07/13/2022    Other hyperlipidemia 06/27/2023    Nonspecific abnormal function study, cardiovascular 08/02/2023     Past Medical History:   Diagnosis Date    Arrhythmia 06/21/2024    Arthritis 11/01/2023    Breath shortness 06/21/2024    Cataract 06/21/2024    Diabetes (HCC) 06/21/2024    Disorder of thyroid 06/21/2024    Heart valve disease 06/21/2024    Hemorrhagic disorder ([facility]) 06/21/2024    High cholesterol 06/21/2024    Hypertension 06/21/2024    Pneumonia 06/21/2024     PAST SURGICAL HISTORY:   Past Surgical History:   Procedure Laterality Date    INGUINAL HERNIA REPAIR ROBOTIC XI Right 6/28/2024    Procedure: ROBOTIC RIGHT INGUINAL HERNIA REPAIR  WITH MESH;  Surgeon: Barrie Watson M.D.;  Location: SURGERY [facility];  Service: Gen Robotic    OTHER  2022    IOL right    OTHER  2020    kidney stones/stent    OTHER CARDIAC SURGERY  2020    angiogram    OTHER  2017    tubes in ears bilat.    OTHER ORTHOPEDIC SURGERY  2007    rt. shoulder rotator cuff repair    OTHER ORTHOPEDIC SURGERY  1980    rt. knee arthroscopy    OTHER ORTHOPEDIC SURGERY  1977    lt shoulder repair    OTHER ORTHOPEDIC SURGERY  1973    right hand tendon repair    OTHER ABDOMINAL SURGERY  1955    appy    OTHER ORTHOPEDIC SURGERY  1954    left index finger re-attached    OTHER      tonsills as child    OTHER ORTHOPEDIC SURGERY      left tendon repair hand     ALLERGIES: No Known Allergies     CURRENT MEDICATIONS:   Current Outpatient Medications:     acetaminophen (TYLENOL) 500 MG Tab, Take 1,000 mg by mouth every 6 hours as needed for Moderate Pain., Disp: , Rfl:     ibuprofen (MOTRIN) 200 MG Tab, Take 200 mg by mouth every 8 hours as needed for Mild Pain., Disp: , Rfl:     methocarbamol (ROBAXIN) 500 MG Tab, Take 2 Tablets by mouth 3 times a day as needed (For abdominal or inguinal pain)., Disp: 60 Tablet, Rfl: 0    metformin (GLUCOPHAGE) 1000 MG tablet, Take 1 Tablet by mouth 2 times a day with meals., Disp: 200 Tablet, Rfl: 1    Non Formulary Request, Take 390 mg by mouth every day. Raw thyroid synagistics complex, Disp: , Rfl:     ELIQUIS 5 MG Tab, Take 1 Tablet by mouth 2 times a day., Disp: 180 Tablet, Rfl: 1    ferrous sulfate 325 (65 Fe) MG tablet, Take 1 Tablet by mouth every day. Only taking 3 times a week (Patient taking differently: Take 325 mg by mouth see administration instructions. 3 times a week), Disp: 100 Tablet, Rfl: 1    furosemide (LASIX) 20 MG Tab, TAKE 1 TABLET BY MOUTH EVERY DAY, Disp: 100 Tablet, Rfl: 3    Empagliflozin (JARDIANCE) 10 MG Tab tablet, Take 1 Tablet by mouth every day., Disp: 100 Tablet, Rfl: 1    FreeStyle Lancets Misc, USE AS DIRECTED ONCE  DAILY, Disp: 100 Each, Rfl: 3    Non Formulary Request, Take 2 Tablets by mouth 2 times a day. Joint supplement, Disp: , Rfl:     rosuvastatin (CRESTOR) 20 MG Tab, Take 1 Tablet by mouth every evening., Disp: 100 Tablet, Rfl: 3    metoprolol tartrate (LOPRESSOR) 50 MG Tab, Take 1 Tablet by mouth 2 times a day., Disp: 200 Tablet, Rfl: 3    FREESTYLE LITE strip, USE AS DIRECTED TO CHECK BLOOD SUGAR ONCE DAILY, Disp: , Rfl:     Coral Calcium 1000 (390 Ca) MG Tab, Take 1,000 mg by mouth 2 times a day., Disp: , Rfl:     zinc sulfate (ZINCATE) 220 (50 Zn) MG Cap, Take 50 mg by mouth every evening., Disp: , Rfl:     ascorbic acid (VITAMIN C) 500 MG tablet, Take 500 mg by mouth 2 times a day., Disp: , Rfl:     vitamin D3 (CHOLECALCIFEROL) 1000 Unit (25 mcg) Tab, Take 5,000 Units by mouth 2 (two) times a day., Disp: , Rfl:     FAMILY HISTORY:   Family History   Problem Relation Age of Onset    No Known Problems Mother     Cancer Father     Heart Attack Brother 38      SOCIAL HISTORY:   Social History     Socioeconomic History    Marital status:      Spouse name: Not on file    Number of children: Not on file    Years of education: Not on file    Highest education level: Not on file   Occupational History    Not on file   Tobacco Use    Smoking status: Former     Current packs/day: 0.00     Average packs/day: 1 pack/day for 20.0 years (20.0 ttl pk-yrs)     Types: Cigarettes     Start date:      Quit date:      Years since quittin.6    Smokeless tobacco: Never   Vaping Use    Vaping status: Never Used   Substance and Sexual Activity    Alcohol use: Not Currently     Comment: quit     Drug use: Never    Sexual activity: Yes     Partners: Female   Other Topics Concern    Not on file   Social History Narrative    Not on file     Social Determinants of Health     Financial Resource Strain: Not on file   Food Insecurity: Not on file   Transportation Needs: Not on file   Physical Activity: Not on file  "  Stress: Not on file   Social Connections: Not on file   Intimate Partner Violence: Not on file   Housing Stability: Not on file     REVIEW OF SYSTEMS:  Review of Systems   Constitutional:  Positive for malaise/fatigue.   HENT:  Positive for hearing loss.    Eyes: Negative.    Respiratory:  Positive for shortness of breath.    Cardiovascular: Negative.    Gastrointestinal: Negative.    Genitourinary: Negative.    Musculoskeletal: Negative.    Skin: Negative.    Neurological: Negative.    Endo/Heme/Allergies: Negative.    Psychiatric/Behavioral: Negative.       PHYSICAL EXAMINATION:    /64 (BP Location: Left arm, Patient Position: Sitting, BP Cuff Size: Adult)   Pulse 89   Temp 36.7 °C (98.1 °F) (Temporal)   Ht 1.753 m (5' 9\")   Wt 74.7 kg (164 lb 9.6 oz)   SpO2 94%   BMI 24.31 kg/m²      Physical Exam  Constitutional:       General: He is not in acute distress.     Comments: frail   HENT:      Head: Normocephalic.   Eyes:      Pupils: Pupils are equal, round, and reactive to light.   Cardiovascular:      Rate and Rhythm: Normal rate and regular rhythm.      Heart sounds: Murmur heard.      Systolic murmur is present with a grade of 3/6.      No gallop.   Pulmonary:      Effort: Pulmonary effort is normal. No respiratory distress.      Breath sounds: Normal breath sounds. No wheezing or rales.   Abdominal:      General: Bowel sounds are normal. There is no distension.      Palpations: Abdomen is soft.      Tenderness: There is no abdominal tenderness.   Musculoskeletal:         General: Normal range of motion.      Cervical back: Neck supple.   Skin:     General: Skin is warm and dry.   Neurological:      Mental Status: He is alert and oriented to person, place, and time.   Psychiatric:         Mood and Affect: Mood and affect normal.         Cognition and Memory: Memory normal.         Judgment: Judgment normal.     LABS REVIEWED:  Lab Results   Component Value Date/Time    SODIUM 135 06/25/2024 11:34 " AM    POTASSIUM 4.8 06/25/2024 11:34 AM    CHLORIDE 96 06/25/2024 11:34 AM    CO2 28 06/25/2024 11:34 AM    GLUCOSE 306 (H) 06/25/2024 11:34 AM    BUN 20 06/25/2024 11:34 AM    CREATININE 0.95 06/25/2024 11:34 AM      Lab Results   Component Value Date/Time    PROTHROMBTM 14.6 11/02/2023 06:35 AM    INR 1.13 11/02/2023 06:35 AM      Lab Results   Component Value Date/Time    WBC 6.1 06/25/2024 11:34 AM    RBC 4.56 (L) 06/25/2024 11:34 AM    HEMOGLOBIN 13.6 (L) 06/25/2024 11:34 AM    HEMATOCRIT 42.8 06/25/2024 11:34 AM    MCV 93.9 06/25/2024 11:34 AM    MCH 29.8 06/25/2024 11:34 AM    MCHC 31.8 (L) 06/25/2024 11:34 AM    MPV 10.1 06/25/2024 11:34 AM    NEUTSPOLYS 59.40 07/19/2022 11:58 AM    LYMPHOCYTES 29.10 07/19/2022 11:58 AM    MONOCYTES 8.60 07/19/2022 11:58 AM    EOSINOPHILS 1.90 07/19/2022 11:58 AM    BASOPHILS 0.40 07/19/2022 11:58 AM      IMAGING REVIEWED AND INTERPRETED:    TRANSESOPHAGEAL ECHOCARDIOGRAM 4/29/24 [facility]:  Mild to moderate tricuspid regurgitation.  Moderate mitral regurgitation - central  Estimated right ventricular systolic pressure is 50 mmHg.    RIGHT HEART CARDIAC CATHETERIZATION [facility] 11/2/24:  Mean right atrial pressure: 4 mmHg  2.   Right ventricular pressure: 30/8 mmHg  3.   Pulmonary artery pressure: 32/16/22 mmHg  4.   Mean pulmonary capillary wedge pressure: 9 mmHg  5.   Pulmonary artery saturation: 58%  6.   Systemic arterial saturation: 92%  7.   Michelle cardiac output: 3.4 L/min  8.   Michelle cardiac index: 1.8 L/min/m2  9.   Thermodilution cardiac output: 3.7 L/min  10. Thermodilution cardiac index: 1.9 L/min/m2  11.  Pulmonary vascular resistance: 3.5 Wood units     IMPRESSION:  Normal right heart filling pressures.  Normal left heart filling pressures.  Mildly elevated pulmonary pressures.  Moderately reduced cardiac output.    IMPRESSION:  Severe symptomatic mitral regurgitation, permanent atrial fibrillation, moderate tricuspid regurgitation, diabetes mellitus,  dyslipidemia    PLAN:  I do not recommend surgical mitral valve repair or replacement and possible Maze procedure due to excessive operative risk.  I estimate this to be greater than 8% due to his age and comorbidities. The STS mortality risk score is 5.6% and the morbidity and mortality risk score is 31% for MVR. The scores were discussed with patient.  He is a good candidate for transcatheter [id number] mitral valve repair (MARTIN, MitraClip) and I recommend completing his workup. The procedure, its risks, benefits, potential complications and alternative treatments were discussed with the patient and his wife in detail.    Findings and recommendations have been discussed with the patient’s cardiologist, Jamir Antonio MD.  Thank you for this very challenging consultation and participation in the patient’s care.  I will keep you apprised of all future developments.    Sincerely,    Laurie Anderson MD, FACS

## 2024-08-14 ENCOUNTER — OFFICE VISIT (OUTPATIENT)
Dept: CARDIOTHORACIC SURGERY | Facility: MEDICAL CENTER | Age: 84
End: 2024-08-14
Payer: MEDICARE

## 2024-08-14 VITALS
WEIGHT: 164.6 LBS | HEIGHT: 69 IN | HEART RATE: 89 BPM | DIASTOLIC BLOOD PRESSURE: 64 MMHG | SYSTOLIC BLOOD PRESSURE: 104 MMHG | TEMPERATURE: 98.1 F | BODY MASS INDEX: 24.38 KG/M2 | OXYGEN SATURATION: 94 %

## 2024-08-14 DIAGNOSIS — I34.0 SEVERE MITRAL REGURGITATION: ICD-10-CM

## 2024-08-14 PROCEDURE — 3078F DIAST BP <80 MM HG: CPT | Performed by: THORACIC SURGERY (CARDIOTHORACIC VASCULAR SURGERY)

## 2024-08-14 PROCEDURE — 3074F SYST BP LT 130 MM HG: CPT | Performed by: THORACIC SURGERY (CARDIOTHORACIC VASCULAR SURGERY)

## 2024-08-14 PROCEDURE — 99205 OFFICE O/P NEW HI 60 MIN: CPT | Performed by: THORACIC SURGERY (CARDIOTHORACIC VASCULAR SURGERY)

## 2024-08-14 ASSESSMENT — ENCOUNTER SYMPTOMS
PSYCHIATRIC NEGATIVE: 1
NEUROLOGICAL NEGATIVE: 1
CARDIOVASCULAR NEGATIVE: 1
GASTROINTESTINAL NEGATIVE: 1
EYES NEGATIVE: 1
SHORTNESS OF BREATH: 1
MUSCULOSKELETAL NEGATIVE: 1

## 2024-08-14 ASSESSMENT — FIBROSIS 4 INDEX: FIB4 SCORE: 1.83

## 2024-08-21 ENCOUNTER — TELEPHONE (OUTPATIENT)
Dept: CARDIOLOGY | Facility: MEDICAL CENTER | Age: 84
End: 2024-08-21
Payer: MEDICARE

## 2024-08-21 NOTE — TELEPHONE ENCOUNTER
Called patient to inform change of tentative Mitral MARTIN procedure date from 9/3/2024 to 9/17/2024. Patient agreeable to the plan. Advised I would call him the week prior to review check in time and pre-op instructions.

## 2024-09-04 ENCOUNTER — DOCUMENTATION (OUTPATIENT)
Dept: CARDIOLOGY | Facility: MEDICAL CENTER | Age: 84
End: 2024-09-04
Payer: MEDICARE

## 2024-09-04 DIAGNOSIS — I34.0 NONRHEUMATIC MITRAL VALVE REGURGITATION: ICD-10-CM

## 2024-09-04 DIAGNOSIS — Z00.6 EXAMINATION OF PARTICIPANT IN CLINICAL TRIAL: ICD-10-CM

## 2024-09-04 DIAGNOSIS — I34.0 SEVERE MITRAL REGURGITATION: ICD-10-CM

## 2024-09-04 NOTE — PROGRESS NOTES
ALLERGY & IMMUNOLOGY CLINIC -  NEW  PATIENT     HISTORY OF PRESENT ILLNESS     Patient ID: João Ham is a 42 y.o. male    CC: Concern for food allergy    HPI: 42 year old presents for evaluation of mushroom allergy. States that on three separate occasion (Last episode more than 5 years ago), he has experienced throat tightness, difficulty breathing and tongue swelling after consumption of mushroom containing products. Two of the episodes occurred when he was consuming Cream of Mushroom soup, and he does not recall the other episode. He additionally experienced nausea, lightheadedness and a headache and all symptoms started within 10 minutes of consumption. Was administered Benadryl and symptoms resolved within the hour. Denies hives, denies wheezing, shortness of breath, vomiting, diarrhea and stomach cramps. He has strictly avoided mushrooms since that time    Does not have issues with milk, egg, peanuts, tree nuts, fish and shellfish.      H/o Asthma: denies  H/o Eczema: denies  H/o Rhinitis: Seasonal Allergy, worsened during the spring season; Does not take allergy medications regularly. No worsening any time of day or indoors vs outdoors  Oral Allergy:  denies  Food Allergy: denies  Venom Allergy: denies  Latex Allergy: denies  Env/Occ: denies any environmental or occupational exposures     REVIEW OF SYSTEMS     Balance of review of systems negative except as mentioned above     MEDICAL HISTORY     MedHx: active problems reviewed  SurgHx: History reviewed. No pertinent surgical history.    SocHx:   -Denies smoking history and illicit drug use  -Alcohol Use: Denies  -Pets: Denies  -Mold/Water Damage: denies    FamHx:   -Asthma:denies   -Atopic Dermatitis: denies  -Rhinitis: Daughter   -Food Allergy: denies    Otherwise no Family History of asthma, allergic rhinitis, atopic dermatitis, drug allergy, food allergy, venom allergy or immune deficiency.     Allergies: see below  Medications: MAR reviewed        Abbott Mitral MARTIN review: Suitable for Implant: Yes  Any Additional Comments: SMR with a wide central jet across A2/P2. Leafleats adequate length to grasp. Mitral leaflets appear clippable. Recommed a 17mm NTW with the possibility of a 2nd NTW if gradient allows. MV gradient measured at 2 mmHg. TR noted, consider screening for TriClip therapy   "PHYSICAL EXAM     VS: Ht 6' 2" (1.88 m)   Wt 130.9 kg (288 lb 9.3 oz)   BMI 37.05 kg/m²   GENERAL: awake, alert, cooperative with exam  EYES: PERRL, EOMI, no conjunctival injection, no discharge, no infraorbital shiners  EARS: external auditory canals normal B/L, TM normal B/L  NOSE: NT 2+ and pink B/L, no stringing mucous, no polyps  ORAL: MMM, no ulcers, no thrush, no cobblestoning  LUNGS: CTAB, no w/r/c, no increased WOB  HEART: Normal Rate and regular rhythm, normal S1/S2, no m/g/r  ABDOMEN: Normoactive bowel sounds, soft, non-tender, non-distended, no HSM  EXTREMITIES: +2 distal pulses, no c/c/e  DERM: no rashes, no skin breaks  NEURO: normal gait, no facial asymmetry     LABORATORY STUDIES     reviewed     ALLERGEN TESTING     Immunocaps: Ordered Today       CHART REVIEW     Previous Notes     ASSESSMENT & PLAN     João Ham is a 42 y.o. male with     Food allergy  -     Misc Sendout Test, Blood Mushroom IgE; Future; Expected date: 11/30/2022    Rhinitis, unspecified type  -     Dermatophagoides [place]; Future; Expected date: 11/30/2022  -     Dermatophagoides Pteronyssinus; Future; Expected date: 11/30/2022  -     [place]; Future; Expected date: 11/30/2022  -     Jayson; Future; Expected date: 11/30/2022  -     Forrest; Future; Expected date: 11/30/2022  -     English Plantain; Future; Expected date: 11/30/2022  -     [place]; Future; Expected date: 11/30/2022  -     Pecan; Future; Expected date: 11/30/2022  -     Morgan Elder; Future; Expected date: 11/30/2022  -     Ragweed; Future; Expected date: 11/30/2022  -     Alternaria; Future; Expected date: 11/30/2022  -     Aspergillus; Future; Expected date: 11/30/2022  -     Cat; Future; Expected date: 11/30/2022  -     Cockroach; Future; Expected date: 11/30/2022  -     IgE; Future; Expected date: 11/30/2022  -     RAST Single Allergen Dog IgE; Future; Expected date: 11/30/2022    MDM: 42 year old presents with 3 distinct episodes of acute onset angioedema " without urticaria and lightheadedness, suspicious for hypotension following consumption of mushrooms. Unable to identify additional food culprits at this time, but common food appears to be mushrooms. Will check IgE level today, patient is not interested in pursuing an observed challenge or re-introducing.If positive, will need EpiPen. Will additionally screen for indoor and outdoor aeroallergen as well     Follow up: Pending lab results      Tyrese Kim MD

## 2024-09-11 ENCOUNTER — APPOINTMENT (OUTPATIENT)
Dept: ADMISSIONS | Facility: MEDICAL CENTER | Age: 84
DRG: 267 | End: 2024-09-11
Attending: INTERNAL MEDICINE
Payer: MEDICARE

## 2024-09-11 ENCOUNTER — TELEPHONE (OUTPATIENT)
Dept: CARDIOLOGY | Facility: MEDICAL CENTER | Age: 84
End: 2024-09-11
Payer: MEDICARE

## 2024-09-11 DIAGNOSIS — I34.0 SEVERE MITRAL REGURGITATION: ICD-10-CM

## 2024-09-11 NOTE — TELEPHONE ENCOUNTER
Valve Conference Plan of Care: 9/11/2024 for Mitral MARTIN 9/17/2024           Mitral MARTIN Candidate: yes  General Anesthesia or MAC: GA with GINETTE  Unit: telemetry  Clearance needed prior to procedure: no    Check in time of 0530 9/17/2024.     Pre-op appointment completed: no, message from preadmit RN that they have been unable to reach the patient    NPO after midnight. Hold vitamins/supplements x7 days, hold ibuprofen x5 days, hold Jardiance x4 days, hold Eliquis x48hrs, hold metformin/furosemide AM of procedure.

## 2024-09-11 NOTE — TELEPHONE ENCOUNTER
Patient notified of procedure date/time and check in process.     All questions were answered. Patient has direct extension for any further questions/concerns prior to the procedure.    Assisted patient in transferring him to pre-admit department to schedule pre-admit appt.    7

## 2024-09-13 ENCOUNTER — PRE-ADMISSION TESTING (OUTPATIENT)
Dept: ADMISSIONS | Facility: MEDICAL CENTER | Age: 84
DRG: 267 | End: 2024-09-13
Attending: INTERNAL MEDICINE
Payer: MEDICARE

## 2024-09-13 DIAGNOSIS — Z01.812 PRE-OPERATIVE LABORATORY EXAMINATION: ICD-10-CM

## 2024-09-13 DIAGNOSIS — Z01.810 PRE-OPERATIVE CARDIOVASCULAR EXAMINATION: ICD-10-CM

## 2024-09-13 LAB
ABO GROUP BLD: NORMAL
ALBUMIN SERPL BCP-MCNC: 4.5 G/DL (ref 3.2–4.9)
ALBUMIN/GLOB SERPL: 1.4 G/DL
ALP SERPL-CCNC: 50 U/L (ref 30–99)
ALT SERPL-CCNC: 22 U/L (ref 2–50)
ANION GAP SERPL CALC-SCNC: 15 MMOL/L (ref 7–16)
APTT PPP: 37.9 SEC (ref 24.7–36)
AST SERPL-CCNC: 27 U/L (ref 12–45)
BASOPHILS # BLD AUTO: 0.6 % (ref 0–1.8)
BASOPHILS # BLD: 0.05 K/UL (ref 0–0.12)
BILIRUB SERPL-MCNC: 0.3 MG/DL (ref 0.1–1.5)
BLD GP AB SCN SERPL QL: NORMAL
BUN SERPL-MCNC: 18 MG/DL (ref 8–22)
CALCIUM ALBUM COR SERPL-MCNC: 9.2 MG/DL (ref 8.5–10.5)
CALCIUM SERPL-MCNC: 9.6 MG/DL (ref 8.4–10.2)
CHLORIDE SERPL-SCNC: 99 MMOL/L (ref 96–112)
CO2 SERPL-SCNC: 23 MMOL/L (ref 20–33)
CREAT SERPL-MCNC: 0.76 MG/DL (ref 0.5–1.4)
EKG IMPRESSION: NORMAL
EOSINOPHIL # BLD AUTO: 0.03 K/UL (ref 0–0.51)
EOSINOPHIL NFR BLD: 0.3 % (ref 0–6.9)
ERYTHROCYTE [DISTWIDTH] IN BLOOD BY AUTOMATED COUNT: 59.9 FL (ref 35.9–50)
EST. AVERAGE GLUCOSE BLD GHB EST-MCNC: 194 MG/DL
GFR SERPLBLD CREATININE-BSD FMLA CKD-EPI: 89 ML/MIN/1.73 M 2
GLOBULIN SER CALC-MCNC: 3.2 G/DL (ref 1.9–3.5)
GLUCOSE SERPL-MCNC: 311 MG/DL (ref 65–99)
HBA1C MFR BLD: 8.4 % (ref 4–5.6)
HCT VFR BLD AUTO: 43.5 % (ref 42–52)
HGB BLD-MCNC: 14.1 G/DL (ref 14–18)
IMM GRANULOCYTES # BLD AUTO: 0.09 K/UL (ref 0–0.11)
IMM GRANULOCYTES NFR BLD AUTO: 1 % (ref 0–0.9)
INR PPP: 1.28 (ref 0.87–1.13)
LYMPHOCYTES # BLD AUTO: 2.32 K/UL (ref 1–4.8)
LYMPHOCYTES NFR BLD: 25.5 % (ref 22–41)
MCH RBC QN AUTO: 30.9 PG (ref 27–33)
MCHC RBC AUTO-ENTMCNC: 32.4 G/DL (ref 32.3–36.5)
MCV RBC AUTO: 95.2 FL (ref 81.4–97.8)
MONOCYTES # BLD AUTO: 1.26 K/UL (ref 0–0.85)
MONOCYTES NFR BLD AUTO: 13.9 % (ref 0–13.4)
NEUTROPHILS # BLD AUTO: 5.34 K/UL (ref 1.82–7.42)
NEUTROPHILS NFR BLD: 58.7 % (ref 44–72)
NRBC # BLD AUTO: 0 K/UL
NRBC BLD-RTO: 0 /100 WBC (ref 0–0.2)
NT-PROBNP SERPL IA-MCNC: 1249 PG/ML (ref 0–125)
PLATELET # BLD AUTO: 186 K/UL (ref 164–446)
PMV BLD AUTO: 10.4 FL (ref 9–12.9)
POTASSIUM SERPL-SCNC: 4.7 MMOL/L (ref 3.6–5.5)
PROT SERPL-MCNC: 7.7 G/DL (ref 6–8.2)
PROTHROMBIN TIME: 16.1 SEC (ref 12–14.6)
RBC # BLD AUTO: 4.57 M/UL (ref 4.7–6.1)
RH BLD: NORMAL
SODIUM SERPL-SCNC: 137 MMOL/L (ref 135–145)
WBC # BLD AUTO: 9.1 K/UL (ref 4.8–10.8)

## 2024-09-13 PROCEDURE — 36415 COLL VENOUS BLD VENIPUNCTURE: CPT

## 2024-09-13 PROCEDURE — 86900 BLOOD TYPING SEROLOGIC ABO: CPT

## 2024-09-13 PROCEDURE — 83880 ASSAY OF NATRIURETIC PEPTIDE: CPT

## 2024-09-13 PROCEDURE — 83036 HEMOGLOBIN GLYCOSYLATED A1C: CPT

## 2024-09-13 PROCEDURE — 85025 COMPLETE CBC W/AUTO DIFF WBC: CPT

## 2024-09-13 PROCEDURE — 85610 PROTHROMBIN TIME: CPT

## 2024-09-13 PROCEDURE — 80053 COMPREHEN METABOLIC PANEL: CPT

## 2024-09-13 PROCEDURE — 85730 THROMBOPLASTIN TIME PARTIAL: CPT

## 2024-09-13 PROCEDURE — 86901 BLOOD TYPING SEROLOGIC RH(D): CPT

## 2024-09-13 PROCEDURE — 93005 ELECTROCARDIOGRAM TRACING: CPT

## 2024-09-13 PROCEDURE — 86850 RBC ANTIBODY SCREEN: CPT

## 2024-09-13 PROCEDURE — 93010 ELECTROCARDIOGRAM REPORT: CPT | Performed by: INTERNAL MEDICINE

## 2024-09-16 NOTE — OR NURSING
@4005 Notified Thu ORR at Dr. Antonio's office of pt's preop testing results including glucose, BNP and A1C.

## 2024-09-17 ENCOUNTER — ANESTHESIA EVENT (OUTPATIENT)
Dept: SURGERY | Facility: MEDICAL CENTER | Age: 84
DRG: 267 | End: 2024-09-17
Payer: MEDICARE

## 2024-09-17 ENCOUNTER — HOSPITAL ENCOUNTER (INPATIENT)
Facility: MEDICAL CENTER | Age: 84
LOS: 1 days | DRG: 267 | End: 2024-09-18
Attending: INTERNAL MEDICINE | Admitting: INTERNAL MEDICINE
Payer: MEDICARE

## 2024-09-17 ENCOUNTER — APPOINTMENT (OUTPATIENT)
Dept: CARDIOLOGY | Facility: MEDICAL CENTER | Age: 84
DRG: 267 | End: 2024-09-17
Attending: ANESTHESIOLOGY
Payer: MEDICARE

## 2024-09-17 ENCOUNTER — APPOINTMENT (OUTPATIENT)
Dept: RADIOLOGY | Facility: MEDICAL CENTER | Age: 84
DRG: 267 | End: 2024-09-17
Payer: MEDICARE

## 2024-09-17 ENCOUNTER — ANESTHESIA (OUTPATIENT)
Dept: SURGERY | Facility: MEDICAL CENTER | Age: 84
DRG: 267 | End: 2024-09-17
Payer: MEDICARE

## 2024-09-17 DIAGNOSIS — Z95.2 S/P TRANSCATHETER MITRAL VALVE REPLACEMENT (TMVR): ICD-10-CM

## 2024-09-17 DIAGNOSIS — I48.11 LONGSTANDING PERSISTENT ATRIAL FIBRILLATION (HCC): Chronic | ICD-10-CM

## 2024-09-17 DIAGNOSIS — I47.29 NSVT (NONSUSTAINED VENTRICULAR TACHYCARDIA) (HCC): ICD-10-CM

## 2024-09-17 LAB
ABO + RH BLD: NORMAL
ACT BLD: 226 SEC (ref 74–137)
ACT BLD: 226 SEC (ref 74–137)
ALBUMIN SERPL BCP-MCNC: 3.8 G/DL (ref 3.2–4.9)
ALBUMIN/GLOB SERPL: 1.5 G/DL
ALP SERPL-CCNC: 42 U/L (ref 30–99)
ALT SERPL-CCNC: 21 U/L (ref 2–50)
ANION GAP SERPL CALC-SCNC: 11 MMOL/L (ref 7–16)
AST SERPL-CCNC: 21 U/L (ref 12–45)
BILIRUB SERPL-MCNC: 0.3 MG/DL (ref 0.1–1.5)
BUN SERPL-MCNC: 15 MG/DL (ref 8–22)
CALCIUM ALBUM COR SERPL-MCNC: 8.6 MG/DL (ref 8.5–10.5)
CALCIUM SERPL-MCNC: 8.4 MG/DL (ref 8.5–10.5)
CHLORIDE SERPL-SCNC: 102 MMOL/L (ref 96–112)
CO2 SERPL-SCNC: 23 MMOL/L (ref 20–33)
CREAT SERPL-MCNC: 0.66 MG/DL (ref 0.5–1.4)
EKG IMPRESSION: NORMAL
ERYTHROCYTE [DISTWIDTH] IN BLOOD BY AUTOMATED COUNT: 58.4 FL (ref 35.9–50)
GFR SERPLBLD CREATININE-BSD FMLA CKD-EPI: 93 ML/MIN/1.73 M 2
GLOBULIN SER CALC-MCNC: 2.5 G/DL (ref 1.9–3.5)
GLUCOSE BLD STRIP.AUTO-MCNC: 214 MG/DL (ref 65–99)
GLUCOSE BLD STRIP.AUTO-MCNC: 217 MG/DL (ref 65–99)
GLUCOSE BLD STRIP.AUTO-MCNC: 217 MG/DL (ref 65–99)
GLUCOSE BLD STRIP.AUTO-MCNC: 260 MG/DL (ref 65–99)
GLUCOSE BLD STRIP.AUTO-MCNC: 275 MG/DL (ref 65–99)
GLUCOSE SERPL-MCNC: 247 MG/DL (ref 65–99)
HCT VFR BLD AUTO: 37.6 % (ref 42–52)
HGB BLD-MCNC: 12.1 G/DL (ref 14–18)
MCH RBC QN AUTO: 29.9 PG (ref 27–33)
MCHC RBC AUTO-ENTMCNC: 32.2 G/DL (ref 32.3–36.5)
MCV RBC AUTO: 92.8 FL (ref 81.4–97.8)
NT-PROBNP SERPL IA-MCNC: 1256 PG/ML (ref 0–125)
PLATELET # BLD AUTO: 136 K/UL (ref 164–446)
PMV BLD AUTO: 10.3 FL (ref 9–12.9)
POTASSIUM SERPL-SCNC: 4.3 MMOL/L (ref 3.6–5.5)
PROT SERPL-MCNC: 6.3 G/DL (ref 6–8.2)
RBC # BLD AUTO: 4.05 M/UL (ref 4.7–6.1)
SODIUM SERPL-SCNC: 136 MMOL/L (ref 135–145)
WBC # BLD AUTO: 7.2 K/UL (ref 4.8–10.8)

## 2024-09-17 PROCEDURE — C1887 CATHETER, GUIDING: HCPCS | Performed by: INTERNAL MEDICINE

## 2024-09-17 PROCEDURE — 82962 GLUCOSE BLOOD TEST: CPT

## 2024-09-17 PROCEDURE — 93355 ECHO TRANSESOPHAGEAL (TEE): CPT

## 2024-09-17 PROCEDURE — 160009 HCHG ANES TIME/MIN: Performed by: INTERNAL MEDICINE

## 2024-09-17 PROCEDURE — 160042 HCHG SURGERY MINUTES - EA ADDL 1 MIN LEVEL 5: Performed by: INTERNAL MEDICINE

## 2024-09-17 PROCEDURE — 02UG3JZ SUPPLEMENT MITRAL VALVE WITH SYNTHETIC SUBSTITUTE, PERCUTANEOUS APPROACH: ICD-10-PCS | Performed by: THORACIC SURGERY (CARDIOTHORACIC VASCULAR SURGERY)

## 2024-09-17 PROCEDURE — 700111 HCHG RX REV CODE 636 W/ 250 OVERRIDE (IP): Performed by: ANESTHESIOLOGY

## 2024-09-17 PROCEDURE — 83880 ASSAY OF NATRIURETIC PEPTIDE: CPT

## 2024-09-17 PROCEDURE — 160002 HCHG RECOVERY MINUTES (STAT): Performed by: INTERNAL MEDICINE

## 2024-09-17 PROCEDURE — 700102 HCHG RX REV CODE 250 W/ 637 OVERRIDE(OP)

## 2024-09-17 PROCEDURE — 85347 COAGULATION TIME ACTIVATED: CPT

## 2024-09-17 PROCEDURE — 86900 BLOOD TYPING SEROLOGIC ABO: CPT

## 2024-09-17 PROCEDURE — C1751 CATH, INF, PER/CENT/MIDLINE: HCPCS | Performed by: INTERNAL MEDICINE

## 2024-09-17 PROCEDURE — C1894 INTRO/SHEATH, NON-LASER: HCPCS | Performed by: INTERNAL MEDICINE

## 2024-09-17 PROCEDURE — 93010 ELECTROCARDIOGRAM REPORT: CPT | Mod: 59 | Performed by: INTERNAL MEDICINE

## 2024-09-17 PROCEDURE — A9270 NON-COVERED ITEM OR SERVICE: HCPCS

## 2024-09-17 PROCEDURE — 700111 HCHG RX REV CODE 636 W/ 250 OVERRIDE (IP): Mod: JZ

## 2024-09-17 PROCEDURE — 160035 HCHG PACU - 1ST 60 MINS PHASE I: Performed by: INTERNAL MEDICINE

## 2024-09-17 PROCEDURE — 33418 REPAIR TCAT MITRAL VALVE: CPT | Mod: 62,Q0 | Performed by: INTERNAL MEDICINE

## 2024-09-17 PROCEDURE — 770020 HCHG ROOM/CARE - TELE (206)

## 2024-09-17 PROCEDURE — 33418 REPAIR TCAT MITRAL VALVE: CPT | Mod: 62,Q0 | Performed by: THORACIC SURGERY (CARDIOTHORACIC VASCULAR SURGERY)

## 2024-09-17 PROCEDURE — 700101 HCHG RX REV CODE 250: Performed by: ANESTHESIOLOGY

## 2024-09-17 PROCEDURE — 36415 COLL VENOUS BLD VENIPUNCTURE: CPT

## 2024-09-17 PROCEDURE — 160031 HCHG SURGERY MINUTES - 1ST 30 MINS LEVEL 5: Performed by: INTERNAL MEDICINE

## 2024-09-17 PROCEDURE — 86901 BLOOD TYPING SEROLOGIC RH(D): CPT

## 2024-09-17 PROCEDURE — 700111 HCHG RX REV CODE 636 W/ 250 OVERRIDE (IP): Performed by: INTERNAL MEDICINE

## 2024-09-17 PROCEDURE — 85027 COMPLETE CBC AUTOMATED: CPT

## 2024-09-17 PROCEDURE — 80053 COMPREHEN METABOLIC PANEL: CPT

## 2024-09-17 PROCEDURE — 700105 HCHG RX REV CODE 258: Performed by: INTERNAL MEDICINE

## 2024-09-17 PROCEDURE — 71045 X-RAY EXAM CHEST 1 VIEW: CPT

## 2024-09-17 PROCEDURE — 93005 ELECTROCARDIOGRAM TRACING: CPT

## 2024-09-17 PROCEDURE — C1760 CLOSURE DEV, VASC: HCPCS | Performed by: INTERNAL MEDICINE

## 2024-09-17 PROCEDURE — 160048 HCHG OR STATISTICAL LEVEL 1-5: Performed by: INTERNAL MEDICINE

## 2024-09-17 PROCEDURE — 700101 HCHG RX REV CODE 250: Performed by: INTERNAL MEDICINE

## 2024-09-17 PROCEDURE — 700105 HCHG RX REV CODE 258

## 2024-09-17 DEVICE — KIT MITRACLIP G4 SYSTEM CATHETER SGC0701 (1/EA): Type: IMPLANTABLE DEVICE | Site: GROIN | Status: FUNCTIONAL

## 2024-09-17 RX ORDER — ONDANSETRON 2 MG/ML
INJECTION INTRAMUSCULAR; INTRAVENOUS PRN
Status: DISCONTINUED | OUTPATIENT
Start: 2024-09-17 | End: 2024-09-17 | Stop reason: SURG

## 2024-09-17 RX ORDER — BISACODYL 10 MG
10 SUPPOSITORY, RECTAL RECTAL
Status: DISCONTINUED | OUTPATIENT
Start: 2024-09-17 | End: 2024-09-18 | Stop reason: HOSPADM

## 2024-09-17 RX ORDER — POTASSIUM CHLORIDE 1500 MG/1
20 TABLET, EXTENDED RELEASE ORAL DAILY
Status: DISCONTINUED | OUTPATIENT
Start: 2024-09-17 | End: 2024-09-18 | Stop reason: HOSPADM

## 2024-09-17 RX ORDER — AMOXICILLIN 250 MG
1 CAPSULE ORAL
Status: DISCONTINUED | OUTPATIENT
Start: 2024-09-17 | End: 2024-09-18 | Stop reason: HOSPADM

## 2024-09-17 RX ORDER — FUROSEMIDE 10 MG/ML
40 INJECTION INTRAMUSCULAR; INTRAVENOUS
Status: DISCONTINUED | OUTPATIENT
Start: 2024-09-17 | End: 2024-09-18 | Stop reason: HOSPADM

## 2024-09-17 RX ORDER — MEPERIDINE HYDROCHLORIDE 25 MG/ML
12.5 INJECTION INTRAMUSCULAR; INTRAVENOUS; SUBCUTANEOUS
Status: DISCONTINUED | OUTPATIENT
Start: 2024-09-17 | End: 2024-09-17 | Stop reason: HOSPADM

## 2024-09-17 RX ORDER — OXYCODONE HCL 5 MG/5 ML
10 SOLUTION, ORAL ORAL
Status: DISCONTINUED | OUTPATIENT
Start: 2024-09-17 | End: 2024-09-17 | Stop reason: HOSPADM

## 2024-09-17 RX ORDER — HYDROMORPHONE HYDROCHLORIDE 1 MG/ML
0.2 INJECTION, SOLUTION INTRAMUSCULAR; INTRAVENOUS; SUBCUTANEOUS
Status: DISCONTINUED | OUTPATIENT
Start: 2024-09-17 | End: 2024-09-17 | Stop reason: HOSPADM

## 2024-09-17 RX ORDER — LIDOCAINE HYDROCHLORIDE 20 MG/ML
INJECTION, SOLUTION INFILTRATION; PERINEURAL
Status: DISCONTINUED | OUTPATIENT
Start: 2024-09-17 | End: 2024-09-17 | Stop reason: HOSPADM

## 2024-09-17 RX ORDER — OXYCODONE HCL 5 MG/5 ML
5 SOLUTION, ORAL ORAL
Status: DISCONTINUED | OUTPATIENT
Start: 2024-09-17 | End: 2024-09-17 | Stop reason: HOSPADM

## 2024-09-17 RX ORDER — HYDROMORPHONE HYDROCHLORIDE 1 MG/ML
0.1 INJECTION, SOLUTION INTRAMUSCULAR; INTRAVENOUS; SUBCUTANEOUS
Status: DISCONTINUED | OUTPATIENT
Start: 2024-09-17 | End: 2024-09-17 | Stop reason: HOSPADM

## 2024-09-17 RX ORDER — METOPROLOL TARTRATE 50 MG
50 TABLET ORAL 2 TIMES DAILY
Status: DISCONTINUED | OUTPATIENT
Start: 2024-09-17 | End: 2024-09-18 | Stop reason: HOSPADM

## 2024-09-17 RX ORDER — LABETALOL HYDROCHLORIDE 5 MG/ML
5 INJECTION, SOLUTION INTRAVENOUS
Status: DISCONTINUED | OUTPATIENT
Start: 2024-09-17 | End: 2024-09-17 | Stop reason: HOSPADM

## 2024-09-17 RX ORDER — ALBUTEROL SULFATE 5 MG/ML
2.5 SOLUTION RESPIRATORY (INHALATION)
Status: DISCONTINUED | OUTPATIENT
Start: 2024-09-17 | End: 2024-09-17 | Stop reason: HOSPADM

## 2024-09-17 RX ORDER — DIPHENHYDRAMINE HCL 25 MG
25 TABLET ORAL NIGHTLY PRN
Status: DISCONTINUED | OUTPATIENT
Start: 2024-09-17 | End: 2024-09-18 | Stop reason: HOSPADM

## 2024-09-17 RX ORDER — ONDANSETRON 2 MG/ML
4 INJECTION INTRAMUSCULAR; INTRAVENOUS
Status: DISCONTINUED | OUTPATIENT
Start: 2024-09-17 | End: 2024-09-17 | Stop reason: HOSPADM

## 2024-09-17 RX ORDER — SODIUM CHLORIDE 9 MG/ML
INJECTION, SOLUTION INTRAVENOUS CONTINUOUS
Status: ACTIVE | OUTPATIENT
Start: 2024-09-17 | End: 2024-09-17

## 2024-09-17 RX ORDER — AMOXICILLIN 250 MG
1 CAPSULE ORAL NIGHTLY
Status: DISCONTINUED | OUTPATIENT
Start: 2024-09-17 | End: 2024-09-18 | Stop reason: HOSPADM

## 2024-09-17 RX ORDER — HYDROMORPHONE HYDROCHLORIDE 1 MG/ML
0.4 INJECTION, SOLUTION INTRAMUSCULAR; INTRAVENOUS; SUBCUTANEOUS
Status: DISCONTINUED | OUTPATIENT
Start: 2024-09-17 | End: 2024-09-17 | Stop reason: HOSPADM

## 2024-09-17 RX ORDER — ROSUVASTATIN CALCIUM 20 MG/1
20 TABLET, COATED ORAL EVERY EVENING
Status: DISCONTINUED | OUTPATIENT
Start: 2024-09-17 | End: 2024-09-18 | Stop reason: HOSPADM

## 2024-09-17 RX ORDER — BUPIVACAINE HYDROCHLORIDE 2.5 MG/ML
INJECTION, SOLUTION EPIDURAL; INFILTRATION; INTRACAUDAL
Status: DISCONTINUED | OUTPATIENT
Start: 2024-09-17 | End: 2024-09-17 | Stop reason: HOSPADM

## 2024-09-17 RX ORDER — DIPHENHYDRAMINE HYDROCHLORIDE 50 MG/ML
12.5 INJECTION INTRAMUSCULAR; INTRAVENOUS
Status: DISCONTINUED | OUTPATIENT
Start: 2024-09-17 | End: 2024-09-17 | Stop reason: HOSPADM

## 2024-09-17 RX ORDER — POLYETHYLENE GLYCOL 3350 17 G/17G
1 POWDER, FOR SOLUTION ORAL 2 TIMES DAILY PRN
Status: DISCONTINUED | OUTPATIENT
Start: 2024-09-17 | End: 2024-09-18 | Stop reason: HOSPADM

## 2024-09-17 RX ORDER — HALOPERIDOL 5 MG/ML
1 INJECTION INTRAMUSCULAR
Status: DISCONTINUED | OUTPATIENT
Start: 2024-09-17 | End: 2024-09-17 | Stop reason: HOSPADM

## 2024-09-17 RX ORDER — DEXAMETHASONE SODIUM PHOSPHATE 4 MG/ML
INJECTION, SOLUTION INTRA-ARTICULAR; INTRALESIONAL; INTRAMUSCULAR; INTRAVENOUS; SOFT TISSUE PRN
Status: DISCONTINUED | OUTPATIENT
Start: 2024-09-17 | End: 2024-09-17 | Stop reason: SURG

## 2024-09-17 RX ORDER — DOCUSATE SODIUM 100 MG/1
100 CAPSULE, LIQUID FILLED ORAL 2 TIMES DAILY
Status: DISCONTINUED | OUTPATIENT
Start: 2024-09-17 | End: 2024-09-18 | Stop reason: HOSPADM

## 2024-09-17 RX ORDER — ONDANSETRON 2 MG/ML
4 INJECTION INTRAMUSCULAR; INTRAVENOUS EVERY 4 HOURS PRN
Status: DISCONTINUED | OUTPATIENT
Start: 2024-09-17 | End: 2024-09-18 | Stop reason: HOSPADM

## 2024-09-17 RX ORDER — SODIUM PHOSPHATE,MONO-DIBASIC 19G-7G/118
1 ENEMA (ML) RECTAL
Status: DISCONTINUED | OUTPATIENT
Start: 2024-09-17 | End: 2024-09-18 | Stop reason: HOSPADM

## 2024-09-17 RX ORDER — HEPARIN SODIUM,PORCINE 1000/ML
VIAL (ML) INJECTION PRN
Status: DISCONTINUED | OUTPATIENT
Start: 2024-09-17 | End: 2024-09-17 | Stop reason: SURG

## 2024-09-17 RX ORDER — DIPHENHYDRAMINE HYDROCHLORIDE 50 MG/ML
25 INJECTION INTRAMUSCULAR; INTRAVENOUS EVERY 6 HOURS PRN
Status: DISCONTINUED | OUTPATIENT
Start: 2024-09-17 | End: 2024-09-18 | Stop reason: HOSPADM

## 2024-09-17 RX ORDER — HYDRALAZINE HYDROCHLORIDE 20 MG/ML
10 INJECTION INTRAMUSCULAR; INTRAVENOUS
Status: DISCONTINUED | OUTPATIENT
Start: 2024-09-17 | End: 2024-09-18 | Stop reason: HOSPADM

## 2024-09-17 RX ORDER — PROTAMINE SULFATE 10 MG/ML
INJECTION, SOLUTION INTRAVENOUS PRN
Status: DISCONTINUED | OUTPATIENT
Start: 2024-09-17 | End: 2024-09-17 | Stop reason: SURG

## 2024-09-17 RX ORDER — DEXTROSE MONOHYDRATE 25 G/50ML
25 INJECTION, SOLUTION INTRAVENOUS
Status: DISCONTINUED | OUTPATIENT
Start: 2024-09-17 | End: 2024-09-18 | Stop reason: HOSPADM

## 2024-09-17 RX ORDER — CEFAZOLIN SODIUM 1 G/3ML
INJECTION, POWDER, FOR SOLUTION INTRAMUSCULAR; INTRAVENOUS PRN
Status: DISCONTINUED | OUTPATIENT
Start: 2024-09-17 | End: 2024-09-17 | Stop reason: SURG

## 2024-09-17 RX ORDER — ACETAMINOPHEN 325 MG/1
650 TABLET ORAL EVERY 6 HOURS PRN
Status: DISCONTINUED | OUTPATIENT
Start: 2024-09-17 | End: 2024-09-18 | Stop reason: HOSPADM

## 2024-09-17 RX ADMIN — PROPOFOL 150 MG: 10 INJECTION, EMULSION INTRAVENOUS at 07:46

## 2024-09-17 RX ADMIN — HEPARIN SODIUM 2000 UNITS: 1000 INJECTION, SOLUTION INTRAVENOUS; SUBCUTANEOUS at 08:28

## 2024-09-17 RX ADMIN — HEPARIN SODIUM 10000 UNITS: 1000 INJECTION, SOLUTION INTRAVENOUS; SUBCUTANEOUS at 08:11

## 2024-09-17 RX ADMIN — FENTANYL CITRATE 100 MCG: 50 INJECTION, SOLUTION INTRAMUSCULAR; INTRAVENOUS at 07:50

## 2024-09-17 RX ADMIN — ONDANSETRON 4 MG: 2 INJECTION INTRAMUSCULAR; INTRAVENOUS at 07:46

## 2024-09-17 RX ADMIN — INSULIN HUMAN 3 UNITS: 100 INJECTION, SOLUTION PARENTERAL at 11:13

## 2024-09-17 RX ADMIN — ACETAMINOPHEN 650 MG: 325 TABLET ORAL at 15:05

## 2024-09-17 RX ADMIN — INSULIN HUMAN 5 UNITS: 100 INJECTION, SOLUTION PARENTERAL at 20:38

## 2024-09-17 RX ADMIN — INSULIN HUMAN 5 UNITS: 100 INJECTION, SOLUTION PARENTERAL at 17:11

## 2024-09-17 RX ADMIN — SODIUM CHLORIDE: 9 INJECTION, SOLUTION INTRAVENOUS at 12:15

## 2024-09-17 RX ADMIN — PROTAMINE SULFATE 30 MG: 10 INJECTION, SOLUTION INTRAVENOUS at 08:50

## 2024-09-17 RX ADMIN — SUGAMMADEX 200 MG: 100 INJECTION, SOLUTION INTRAVENOUS at 09:03

## 2024-09-17 RX ADMIN — SENNOSIDES AND DOCUSATE SODIUM 1 TABLET: 50; 8.6 TABLET ORAL at 20:35

## 2024-09-17 RX ADMIN — ROSUVASTATIN CALCIUM 20 MG: 20 TABLET, FILM COATED ORAL at 17:08

## 2024-09-17 RX ADMIN — CEFAZOLIN 2 G: 1 INJECTION, POWDER, FOR SOLUTION INTRAMUSCULAR; INTRAVENOUS at 07:45

## 2024-09-17 RX ADMIN — ROCURONIUM BROMIDE 100 MG: 10 INJECTION, SOLUTION INTRAVENOUS at 07:46

## 2024-09-17 RX ADMIN — DEXAMETHASONE SODIUM PHOSPHATE 8 MG: 4 INJECTION INTRA-ARTICULAR; INTRALESIONAL; INTRAMUSCULAR; INTRAVENOUS; SOFT TISSUE at 07:46

## 2024-09-17 RX ADMIN — POTASSIUM CHLORIDE 20 MEQ: 1500 TABLET, EXTENDED RELEASE ORAL at 12:15

## 2024-09-17 RX ADMIN — METOPROLOL TARTRATE 50 MG: 50 TABLET, FILM COATED ORAL at 17:07

## 2024-09-17 RX ADMIN — FUROSEMIDE 40 MG: 10 INJECTION INTRAMUSCULAR; INTRAVENOUS at 12:15

## 2024-09-17 RX ADMIN — ACETAMINOPHEN 650 MG: 325 TABLET ORAL at 22:44

## 2024-09-17 ASSESSMENT — CHA2DS2 SCORE
CHF OR LEFT VENTRICULAR DYSFUNCTION: NO
SEX: MALE
AGE 75 OR GREATER: YES
CHA2DS2 VASC SCORE: 3
AGE 65 TO 74: NO
VASCULAR DISEASE: NO
DIABETES: YES
PRIOR STROKE OR TIA OR THROMBOEMBOLISM: NO
HYPERTENSION: NO

## 2024-09-17 ASSESSMENT — PAIN DESCRIPTION - PAIN TYPE
TYPE: ACUTE PAIN
TYPE: ACUTE PAIN

## 2024-09-17 ASSESSMENT — FIBROSIS 4 INDEX
FIB4 SCORE: 2.54
FIB4 SCORE: 2.54

## 2024-09-17 NOTE — ANESTHESIA PROCEDURE NOTES
GINETTE    Date/Time: 9/17/2024 7:45 AM    Performed by: Stalin Jack M.D.  Authorized by: Stalin Jack M.D.    Start Time:9/17/2024 7:45 AM  Preanesthetic Checklist: patient identified, IV checked, site marked, risks and benefits discussed, surgical consent, monitors and equipment checked, pre-op evaluation and timeout performed    Indication for GINETTE: diagnostic     Intubated: Yes  Bite Block: Yes  Heart Visualized: Yes  Insertion: atraumatic    **See FULL GINETTE report in patient's chart via CV Synapse**

## 2024-09-17 NOTE — OR SURGEON
OPERATIVE REPORT    Operation date: 9/17/2024    PreOp Diagnosis: Severe symptomatic mitral regurgitation    PostOp Diagnosis: Severe symptomatic mitral regurgitation    Procedure(s):  TRANSCATHETER [id number] MITRAL VALVE REPAIR (MARTIN, MitraClip XTW x 1), transseptal puncture (Enid catheter) and intraoperative transesophageal echocardiography    Surgeon(s):  MANDI Kemp M.D.    Anesthesiologist/Type of Anesthesia:  Anesthesiologist: Stalin Jack M.D.  Anesthesia Technician: Levon Potter/General    Surgical Staff:  Circulator: Misha Szymanski R.N.; Nishant Perez R.N.  Relief Circulator: Johana Shay R.N.  Scrub Person: Nallely Hooper  Cath Lab Tech: Yecenia Gibson  Cardiology Nurse: Thu Monreal R.N.    Specimens removed if any: None    Estimated Blood Loss: Minimal    Findings: Severe mitral regurgitation    Complications: None    Indications:  Mr. Owen is an 83-year-old male with severe symptomatic mitral regurgitation.    Procedure:  The patient was brought to the operating room and placed on the operating room table in the supine position.  After successful induction of general anesthesia endotracheal intubation, the patient was prepped and draped in the usual sterile fashion.  Ultrasound-guided right femoral venous access was obtained.  A 1 cm incision was made in the right groin and 2 Perclose sutures were deployed.  The Enid catheter was used to perform the transseptal puncture in the mid and superior interatrial septum.  A ProTrac wire was introduced into the left atrium and the En[place] catheter was removed.  Serial dilatations of the right groin wound were performed.  A steerable guide catheter was then advanced into the left atrium and the ProTrac wire was removed.  A clip delivery system catheter with a MitraClip XTW at its tip was positioned appropriately over the mid A2 P2 scallops.  It was then pushed into the left ventricle and pulled back to  grasp the mid A2 P2.  Intraoperative transesophageal echocardiography showed marked reduction of the mitral regurgitant jet from 4+ to 1+.  The mean mitral transvalvular gradient was 4 mmHg.  The entire system was pulled out of the patient and the Perclose sutures were tightened down.  Dermabond was applied over the small right groin incision.  The remainder of the operation will be dictated by Dr. Antonio.  There were no apparent complications.  The patient tolerated the procedure well and left the operating room in stable condition.      9/17/2024 8:57 AM Laurie Anderson MD, FACS

## 2024-09-17 NOTE — ANESTHESIA PROCEDURE NOTES
Airway    Date/Time: 9/17/2024 7:47 AM    Performed by: Stalin Jack M.D.  Authorized by: Stalin Jack M.D.    Location:  OR  Urgency:  Elective  Indications for Airway Management:  Anesthesia      Spontaneous Ventilation: absent    Sedation Level:  Deep  Preoxygenated: Yes    Patient Position:  Sniffing  Final Airway Type:  Endotracheal airway  Final Endotracheal Airway:  ETT  Cuffed: Yes    Technique Used for Successful ETT Placement:  Direct laryngoscopy    Insertion Site:  Oral  Blade Type:  Matthew  Laryngoscope Blade/Videolaryngoscope Blade Size:  3  ETT Size (mm):  7.0  Measured from:  Teeth  ETT to Teeth (cm):  21  Placement Verified by: auscultation and capnometry    Cormack-Lehane Classification:  Grade IIb - view of arytenoids or posterior of glottis only  Number of Attempts at Approach:  1

## 2024-09-17 NOTE — ANESTHESIA PROCEDURE NOTES
Arterial Line    Performed by: Stalin Jcak M.D.  Authorized by: Stalin Jack M.D.    Start Time:  9/17/2024 7:45 AM  Localization: surface landmarks    Patient Location:  OR  Indication: continuous blood pressure monitoring        Catheter Size:  20 G  Seldinger Technique?: Yes    Laterality:  Right  Site:  Radial artery  Line Secured:  Antimicrobial disc, tape and transparent dressing  Events: patient tolerated procedure well with no complications

## 2024-09-17 NOTE — PROGRESS NOTES
Report received from Marcia DUNNE Patient transported to room via Marcia RN. Patient placed on tele. On 2L nasal cannula. A&O x4. Oriented to room. Educated on fall risk, all fall precautions in place. Call light within reach, bed locked and in lowest position, denied other needs at this time.

## 2024-09-17 NOTE — ANESTHESIA PREPROCEDURE EVALUATION
Case: [id number] Date/Time: 09/17/24 0715    Procedures:       TRANSCATHETER MITRAL VALVE PROCEDURE (Right: Groin)      ECHOCARDIOGRAM, TRANSESOPHAGEAL, INTRAOPERATIVE (Throat)    Anesthesia type: General    Pre-op diagnosis: SEVERE MITRAL REGURGITATION    Location: [facility] OR  / SURGERY [facility]    Surgeons: Jamir Antonio M.D.            Relevant Problems   CARDIAC   (positive) Longstanding persistent atrial fibrillation (HCC)   (positive) Severe mitral regurgitation      ENDO   (positive) Hypothyroidism   (positive) Type 2 diabetes mellitus without complication (HCC)       Physical Exam    Airway   Mallampati: II  TM distance: >3 FB  Neck ROM: full       Cardiovascular - normal exam  Rhythm: regular  Rate: normal  (-) murmur     Dental - normal exam           Pulmonary - normal exam  Breath sounds clear to auscultation     Abdominal    Neurological - normal exam                   Anesthesia Plan    ASA 4       Plan - general       Airway plan will be ETT  GINETTE Planned        Induction: intravenous    Postoperative Plan: Postoperative administration of opioids is intended.    Pertinent diagnostic labs and testing reviewed    Informed Consent:    Anesthetic plan and risks discussed with patient.    Use of blood products discussed with: patient whom consented to blood products.

## 2024-09-17 NOTE — CARE PLAN
The patient is Stable - Low risk of patient condition declining or worsening    Shift Goals  Clinical Goals: Hemodynamic stability, safety  Patient Goals: comfort  Family Goals: Updates    Progress made toward(s) clinical / shift goals:    Problem: Fall Risk  Goal: Patient will remain free from falls  Outcome: Progressing     Problem: Knowledge Deficit - Standard  Goal: Patient and family/care givers will demonstrate understanding of plan of care, disease process/condition, diagnostic tests and medications  Outcome: Progressing     Problem: Pain - Standard  Goal: Alleviation of pain or a reduction in pain to the patient’s comfort goal  Outcome: Progressing       Patient is not progressing towards the following goals:

## 2024-09-17 NOTE — ANESTHESIA TIME REPORT
Anesthesia Start and Stop Event Times       Date Time Event    9/17/2024 0726 Ready for Procedure     0741 Anesthesia Start     0913 Anesthesia Stop          Responsible Staff  09/17/24      Name Role Begin End    Levon Potter Anesth Tech 0741 0913    Stalin Jack M.D. Anesth 0741 0913          Overtime Reason:  no overtime (within assigned shift)    Comments:

## 2024-09-17 NOTE — ANESTHESIA POSTPROCEDURE EVALUATION
Patient: David Hinson Hirschkormarah    Procedure Summary       Date: 09/17/24 Room / Location: [place] [address] / SURGERY [facility]    Anesthesia Start: 0741 Anesthesia Stop: 0913    Procedures:       TRANSCATHETER MITRAL VALVE PROCEDURE (Right: Groin)      ECHOCARDIOGRAM, TRANSESOPHAGEAL, INTRAOPERATIVE (Throat) Diagnosis: (SEVERE MITRAL REGURGITATION)    Surgeons: Jamir Antonio M.D. Responsible Provider: Stalin Jack M.D.    Anesthesia Type: general ASA Status: 4            Final Anesthesia Type: general  Last vitals  BP   Blood Pressure : 128/75    Temp   36 °C (96.8 °F)    Pulse   65   Resp   16    SpO2   98 %      Anesthesia Post Evaluation    Patient location during evaluation: PACU  Patient participation: complete - patient participated  Level of consciousness: awake and alert    Airway patency: patent  Anesthetic complications: no  Cardiovascular status: hemodynamically stable  Respiratory status: acceptable  Hydration status: euvolemic    PONV: none          There were no known notable events for this encounter.     Nurse Pain Score: 5 (NPRS)

## 2024-09-17 NOTE — PROCEDURES
DATE OF PROCEDURE: 9/17/24    REFERRING PHYSICIAN:     PROCEDURES:  1. Transcatheter mitral valve edge to edge repair (MARTIN or Mitraclip)    PRE PROCEDURAL DIAGNOSIS:  Severe symptomatic mitral regurgitation NYHA III C , due to degenerative mitral valve disease and high surgical risk.    POST PROCEDURAL DIAGNOSIS:  Successful transcatheter mitral valve edge to edge repair using XTW clip, reduction of mitral regurgitation from 4+ to 1+    Surgeons: Dr. Antonio (interventional cardiologist) Dr. Anderson (Cardiothoracic surgeon)      DESCRIPTION OF PROCEDURE:  After informed consent was signed by the   patient, the patient was brought into the OR 19.  Bilateral groin was prepped and draped in   usual sterile manner.  The initial timeout was performed.     A 6-Kazakh venous sheath was placed in the right femoral vein by Modified Seldinger   technique under ultrasound guidance. A PerClose devices was delivered after dilatation of skin track.  An 8.5-Kazakh TorFlex venous sheath was advanced. Half dose IV heparin was given. A Purdum   needle was inserted into the catheter and both the needle and the sheath were placed   in the right atrium. Under transesophageal echocardiogram guidance, the Purdum needle   was used to puncture the septum and the catheter was advanced into the left atrium by   myself. Rest of the half dose Heparin was given. A Protrack pigtail wire was positioned into the left   atrium. The sheath was removed and the femoral access site was dilated with   multiple dialators. The 22/24-Kazakh steerable guide catheter handle was inserted into  the left atrium under transesophageal echocardiogram guidance and the dilator was   removed by .  A XTW Clip attached to the delivery catheter handle was inserted in to   the left atrium. Again, under transesophageal echocardiogram guidance, the clip was   opened, advanced into the left ventricle and pulled up to the mitral valve leaflets at  the   A2/P2 position. The leaflets were grasped and the clip was partially closed. The steerable   device was manipulated by . The severity of mitral regurgitation and the mitral valve   gradient were measured. The clip was then closed fully and released by myself.The   delivery catheter was removed.    The mitral regurgitation was reduced from 4+ to 1+, the mean gradient was 4 mmHg.  Total length A2/P2 is diseased, there is mild mitral regurgitation at the end, due to gradient, we did not place another clip.    The patient tolerated the procedure well. At the end of procedure hemodynamics were   again obtained. The steerable guide catheter was removed and the right femoral venous   access site was closed via PerClose closure.  The patient was then extubated and transferred to PACU in stable condition.      RECOMMENDATION: Guideline directed medical therapy.

## 2024-09-17 NOTE — OR NURSING
Assume care for pt in pre-op. Pt pre op checklist complete. Consents for surgical procedure signed and on chart. Iv placed, cod sample sent to lab. Bed in lowest position, call light within reach, all questions answered.

## 2024-09-17 NOTE — PROGRESS NOTES
Medication history reviewed with PT at bedside    [facility] is complete per PT reporting    Allergies reviewed.     Patient denies any outpatient antibiotics in the last 30 days.     Patient is not taking anticoagulants.    Preferred pharmacy for this visit - [facility] ([phone])

## 2024-09-17 NOTE — PROGRESS NOTES
4 Eyes Skin Assessment Completed by KIRBY Person and KIRBY Barrett.    Head WDL  Ears WDL  Nose WDL  Mouth WDL  Neck WDL  Breast/Chest WDL  Shoulder Blades WDL  Spine WDL  (R) Arm/Elbow/Hand Dressing in place for arterial line site  (L) Arm/Elbow/Hand WDL  Abdomen Scar and Scab 2 old laparoscopic sites scabbed, old abdominal scar  Groin Right groin incision with dermabond  Scrotum/Coccyx/Buttocks redness and blanching  (R) Leg WDL  (L) Leg WDL  (R) Heel/Foot/Toe WDL  (L) Heel/Foot/Toe WDL          Devices In Places Tele Box, Blood Pressure Cuff, and Pulse Ox      Interventions In Place Gray Ear Foams and Pillows    Possible Skin Injury No    Pictures Uploaded Into Epic N/A  Wound Consult Placed N/A  RN Wound Prevention Protocol Ordered No

## 2024-09-17 NOTE — H&P
HPI:  83 y.o.-year-old patient here for elective mitral valve repair using mitraclip    Medications / Drug list prior to admission:  No current facility-administered medications on file prior to encounter.     Current Outpatient Medications on File Prior to Encounter   Medication Sig Dispense Refill    acetaminophen (TYLENOL) 500 MG Tab Take 1,000 mg by mouth every 6 hours as needed for Moderate Pain.      rosuvastatin (CRESTOR) 20 MG Tab Take 1 Tablet by mouth every evening. 100 Tablet 3    metoprolol tartrate (LOPRESSOR) 50 MG Tab Take 1 Tablet by mouth 2 times a day. 200 Tablet 3    metformin (GLUCOPHAGE) 1000 MG tablet Take 1 Tablet by mouth 2 times a day with meals. 200 Tablet 1    Non Formulary Request Take 390 mg by mouth every day. Raw thyroid synagistics complex      ELIQUIS 5 MG Tab Take 1 Tablet by mouth 2 times a day. 180 Tablet 1    ferrous sulfate 325 (65 Fe) MG tablet Take 1 Tablet by mouth every day. Only taking 3 times a week (Patient taking differently: Take 325 mg by mouth see administration instructions. 3 days a week) 100 Tablet 1    furosemide (LASIX) 20 MG Tab TAKE 1 TABLET BY MOUTH EVERY  Tablet 3    Empagliflozin (JARDIANCE) 10 MG Tab tablet Take 1 Tablet by mouth every day. 100 Tablet 1    FreeStyle Lancets Misc USE AS DIRECTED ONCE DAILY 100 Each 3    Non Formulary Request Take 2 Tablets by mouth 2 times a day. Joint supplement      FREESTYLE LITE strip USE AS DIRECTED TO CHECK BLOOD SUGAR ONCE DAILY      Coral Calcium 1000 (390 Ca) MG Tab Take 1,000 mg by mouth 2 times a day.      zinc sulfate (ZINCATE) 220 (50 Zn) MG Cap Take 50 mg by mouth every evening.      ascorbic acid (VITAMIN C) 500 MG tablet Take 500 mg by mouth 2 times a day.      vitamin D3 (CHOLECALCIFEROL) 1000 Unit (25 mcg) Tab Take 5,000 Units by mouth 2 (two) times a day.         Current list of administered Medications:    Current Facility-Administered Medications:     lidocaine (Xylocaine) 1 % injection 0.5 mL,  0.5 mL, Intradermal, Once PRN, Jamir Antonio M.D.    Past Medical History:   Diagnosis Date    Anginal syndrome (HCC)     Arrhythmia 06/21/2024 2023, afib, medicated    Arthritis 11/01/2023    hands    Breath shortness 06/21/2024    with exertion    Cataract 06/21/2024    IOLOD    Diabetes (HCC) 06/21/2024    medicated    Disorder of thyroid 06/21/2024    medicated    Heart valve disease 06/21/2024    mitral/tricuspid valve leakage    Hemorrhagic disorder (HCC) 06/21/2024    eliquis    High cholesterol 06/21/2024    medicated    Hypertension 06/21/2024    medicated    Pneumonia 06/21/2024    history of       Past Surgical History:   Procedure Laterality Date    INGUINAL HERNIA REPAIR ROBOTIC XI Right 6/28/2024    Procedure: ROBOTIC RIGHT INGUINAL HERNIA REPAIR WITH MESH;  Surgeon: Barrie Watson M.D.;  Location: SURGERY [facility];  Service: Gen Robotic    OTHER  2022    IOL right    OTHER  2020    kidney stones/stent    OTHER CARDIAC SURGERY  2020    angiogram    OTHER  2017    tubes in ears bilat.    OTHER ORTHOPEDIC SURGERY  2007    rt. shoulder rotator cuff repair    OTHER ORTHOPEDIC SURGERY  1980    rt. knee arthroscopy    OTHER ORTHOPEDIC SURGERY  1977    lt shoulder repair    OTHER ORTHOPEDIC SURGERY  1973    right hand tendon repair    OTHER ABDOMINAL SURGERY  1955    appy    OTHER ORTHOPEDIC SURGERY  1954    left index finger re-attached    OTHER      tonsills as child    OTHER ORTHOPEDIC SURGERY      left tendon repair hand       Family History   Problem Relation Age of Onset    No Known Problems Mother     Cancer Father     Heart Attack Brother 38     Patient family history was personally reviewed, no pertinent family history to current presentation    Social History     Tobacco Use    Smoking status: Former     Current packs/day: 0.00     Average packs/day: 1 pack/day for 20.0 years (20.0 ttl pk-yrs)     Types: Cigarettes     Start date: 1960     Quit date: 1980     Years since quitting:  "44.7    Smokeless tobacco: Never   Vaping Use    Vaping status: Never Used   Substance Use Topics    Alcohol use: Not Currently     Comment: quit 1980    Drug use: Never       ALLERGIES:  No Known Allergies    Review of systems:  A complete review of symptoms was reviewed with patient. This is reviewed in H&P and PMH. ALL OTHERS reviewed and negative    Physical exam:  Patient Vitals for the past 24 hrs:   BP Temp Temp src Pulse Resp SpO2 Height Weight   09/17/24 0609 128/75 -- -- -- -- -- -- --   09/17/24 0606 119/80 (!) 35.7 °C (96.3 °F) Temporal 65 16 94 % 1.753 m (5' 9\") 77 kg (169 lb 12.1 oz)   09/17/24 0539 -- -- -- -- -- -- -- 74.4 kg (164 lb 0.4 oz)     General: No acute distress.   EYES: no jaundice  HEENT: OP clear   Neck:  No JVD.   CVS:  murmur+  Resp: Normal respiratory effort, CTAB. No wheezing or crackles/rhonchi.        Data:  Laboratory studies personally reviewed by me:  Recent Results (from the past 24 hour(s))   POCT glucose device results    Collection Time: 09/17/24  6:57 AM   Result Value Ref Range    POC Glucose, Blood 217 (H) 65 - 99 mg/dL   ABO Rh Confirm    Collection Time: 09/17/24  6:58 AM   Result Value Ref Range    ABO Rh Confirm A POS        Imaging:  EC-GINETTE W/O CONT    (Results Pending)         Pertinent cardiac testing, EKG, echocardiogram, prior angiogram films if any reviewed    All pertinent features of laboratory and imaging reviewed including primary images where applicable      Active Problems:    * No active hospital problems. *  Resolved Problems:    * No resolved hospital problems. *      Assessment / Plan:  83 y.o.-year-old patient here for elective mitral valve repair using mitraclip  Risk benefits of the procedure discussed in detail, patient agrees to proceed        I personally discussed his case with  Dr Jamir Antonio M.D.    Future Appointments   Date Time Provider Department Center   9/24/2024 12:45 PM [facility] None   10/15/2024  " 9:15 AM [facility] EXAM 8 NONINV [facility]   10/24/2024  1:45 PM EL[facility]ABETH Fern[facility]dez None   8/12/2025 10:15 AM [facility] EXAM 9 ECHO [facility]   8/21/2025  9:45 AM [facility]ETH [facility] None       It is my pleasure to participate in the care of Mr. Owen.  Please do not hesitate to contact me with questions or concerns.    Jamir Antonio M.D.    9/17/2024

## 2024-09-17 NOTE — ADDENDUM NOTE
Addendum  created 09/17/24 1315 by Stalin Jack M.D.    Clinical Note Signed, Intraprocedure Blocks edited, Intraprocedure Event edited

## 2024-09-18 ENCOUNTER — APPOINTMENT (OUTPATIENT)
Dept: RADIOLOGY | Facility: MEDICAL CENTER | Age: 84
DRG: 267 | End: 2024-09-18
Payer: MEDICARE

## 2024-09-18 ENCOUNTER — NON-PROVIDER VISIT (OUTPATIENT)
Dept: CARDIOLOGY | Facility: MEDICAL CENTER | Age: 84
End: 2024-09-18
Payer: MEDICARE

## 2024-09-18 VITALS
SYSTOLIC BLOOD PRESSURE: 95 MMHG | TEMPERATURE: 97.9 F | HEART RATE: 89 BPM | OXYGEN SATURATION: 92 % | HEIGHT: 69 IN | BODY MASS INDEX: 24.69 KG/M2 | RESPIRATION RATE: 16 BRPM | WEIGHT: 166.67 LBS | DIASTOLIC BLOOD PRESSURE: 67 MMHG

## 2024-09-18 DIAGNOSIS — I48.11 LONGSTANDING PERSISTENT ATRIAL FIBRILLATION (HCC): Chronic | ICD-10-CM

## 2024-09-18 DIAGNOSIS — I49.3 PVC'S (PREMATURE VENTRICULAR CONTRACTIONS): ICD-10-CM

## 2024-09-18 DIAGNOSIS — I47.29 NSVT (NONSUSTAINED VENTRICULAR TACHYCARDIA) (HCC): ICD-10-CM

## 2024-09-18 DIAGNOSIS — I49.8 VENTRICULAR BIGEMINY: ICD-10-CM

## 2024-09-18 PROBLEM — I34.0 SEVERE MITRAL REGURGITATION: Status: RESOLVED | Noted: 2023-10-25 | Resolved: 2024-09-18

## 2024-09-18 LAB
ALBUMIN SERPL BCP-MCNC: 3.6 G/DL (ref 3.2–4.9)
ALBUMIN/GLOB SERPL: 1.4 G/DL
ALP SERPL-CCNC: 43 U/L (ref 30–99)
ALT SERPL-CCNC: 18 U/L (ref 2–50)
ANION GAP SERPL CALC-SCNC: 9 MMOL/L (ref 7–16)
AST SERPL-CCNC: 22 U/L (ref 12–45)
BILIRUB SERPL-MCNC: 0.6 MG/DL (ref 0.1–1.5)
BUN SERPL-MCNC: 16 MG/DL (ref 8–22)
CALCIUM ALBUM COR SERPL-MCNC: 8.9 MG/DL (ref 8.5–10.5)
CALCIUM SERPL-MCNC: 8.6 MG/DL (ref 8.5–10.5)
CHLORIDE SERPL-SCNC: 100 MMOL/L (ref 96–112)
CO2 SERPL-SCNC: 27 MMOL/L (ref 20–33)
CREAT SERPL-MCNC: 0.82 MG/DL (ref 0.5–1.4)
ERYTHROCYTE [DISTWIDTH] IN BLOOD BY AUTOMATED COUNT: 58.4 FL (ref 35.9–50)
GFR SERPLBLD CREATININE-BSD FMLA CKD-EPI: 87 ML/MIN/1.73 M 2
GLOBULIN SER CALC-MCNC: 2.5 G/DL (ref 1.9–3.5)
GLUCOSE BLD STRIP.AUTO-MCNC: 161 MG/DL (ref 65–99)
GLUCOSE SERPL-MCNC: 118 MG/DL (ref 65–99)
HCT VFR BLD AUTO: 35.2 % (ref 42–52)
HGB BLD-MCNC: 11.6 G/DL (ref 14–18)
MCH RBC QN AUTO: 30.6 PG (ref 27–33)
MCHC RBC AUTO-ENTMCNC: 33 G/DL (ref 32.3–36.5)
MCV RBC AUTO: 92.9 FL (ref 81.4–97.8)
NT-PROBNP SERPL IA-MCNC: 1713 PG/ML (ref 0–125)
PLATELET # BLD AUTO: 131 K/UL (ref 164–446)
PMV BLD AUTO: 9.8 FL (ref 9–12.9)
POTASSIUM SERPL-SCNC: 4 MMOL/L (ref 3.6–5.5)
PROT SERPL-MCNC: 6.1 G/DL (ref 6–8.2)
RBC # BLD AUTO: 3.79 M/UL (ref 4.7–6.1)
SODIUM SERPL-SCNC: 136 MMOL/L (ref 135–145)
WBC # BLD AUTO: 10.1 K/UL (ref 4.8–10.8)

## 2024-09-18 PROCEDURE — 97535 SELF CARE MNGMENT TRAINING: CPT

## 2024-09-18 PROCEDURE — 71045 X-RAY EXAM CHEST 1 VIEW: CPT

## 2024-09-18 PROCEDURE — 700111 HCHG RX REV CODE 636 W/ 250 OVERRIDE (IP): Mod: JZ

## 2024-09-18 PROCEDURE — 85027 COMPLETE CBC AUTOMATED: CPT

## 2024-09-18 PROCEDURE — 82962 GLUCOSE BLOOD TEST: CPT

## 2024-09-18 PROCEDURE — 700102 HCHG RX REV CODE 250 W/ 637 OVERRIDE(OP)

## 2024-09-18 PROCEDURE — 97162 PT EVAL MOD COMPLEX 30 MIN: CPT

## 2024-09-18 PROCEDURE — 36415 COLL VENOUS BLD VENIPUNCTURE: CPT

## 2024-09-18 PROCEDURE — A9270 NON-COVERED ITEM OR SERVICE: HCPCS

## 2024-09-18 PROCEDURE — 83880 ASSAY OF NATRIURETIC PEPTIDE: CPT

## 2024-09-18 PROCEDURE — 80053 COMPREHEN METABOLIC PANEL: CPT

## 2024-09-18 RX ADMIN — METOPROLOL TARTRATE 50 MG: 50 TABLET, FILM COATED ORAL at 05:58

## 2024-09-18 RX ADMIN — DOCUSATE SODIUM 100 MG: 100 CAPSULE, LIQUID FILLED ORAL at 05:58

## 2024-09-18 RX ADMIN — POTASSIUM CHLORIDE 20 MEQ: 1500 TABLET, EXTENDED RELEASE ORAL at 05:58

## 2024-09-18 RX ADMIN — APIXABAN 5 MG: 5 TABLET, FILM COATED ORAL at 05:58

## 2024-09-18 RX ADMIN — ACETAMINOPHEN 650 MG: 325 TABLET ORAL at 05:58

## 2024-09-18 RX ADMIN — FUROSEMIDE 40 MG: 10 INJECTION INTRAMUSCULAR; INTRAVENOUS at 05:58

## 2024-09-18 RX ADMIN — INSULIN HUMAN 2 UNITS: 100 INJECTION, SOLUTION PARENTERAL at 06:08

## 2024-09-18 ASSESSMENT — COGNITIVE AND FUNCTIONAL STATUS - GENERAL
SUGGESTED CMS G CODE MODIFIER DAILY ACTIVITY: CH
SUGGESTED CMS G CODE MODIFIER MOBILITY: CI
CLIMB 3 TO 5 STEPS WITH RAILING: A LITTLE
SUGGESTED CMS G CODE MODIFIER MOBILITY: CK
DAILY ACTIVITIY SCORE: 24
CLIMB 3 TO 5 STEPS WITH RAILING: A LITTLE
MOBILITY SCORE: 23
MOVING FROM LYING ON BACK TO SITTING ON SIDE OF FLAT BED: A LITTLE
WALKING IN HOSPITAL ROOM: A LITTLE
MOVING TO AND FROM BED TO CHAIR: A LITTLE
TURNING FROM BACK TO SIDE WHILE IN FLAT BAD: A LITTLE
STANDING UP FROM CHAIR USING ARMS: A LITTLE
MOBILITY SCORE: 18

## 2024-09-18 ASSESSMENT — GAIT ASSESSMENTS
DISTANCE (FEET): 500
GAIT LEVEL OF ASSIST: SUPERVISED

## 2024-09-18 ASSESSMENT — PAIN DESCRIPTION - PAIN TYPE: TYPE: ACUTE PAIN

## 2024-09-18 ASSESSMENT — FIBROSIS 4 INDEX: FIB4 SCORE: 3.25

## 2024-09-18 NOTE — CARE PLAN
The patient is Stable - Low risk of patient condition declining or worsening    Shift Goals  Clinical Goals: safety, walks  Patient Goals: comfort, rest  Family Goals: armand    Progress made toward(s) clinical / shift goals:  pt safety maintained, walks per flowsheets. Pt able to rest comfortably in bed.       Problem: Knowledge Deficit - Standard  Goal: Patient and family/care givers will demonstrate understanding of plan of care, disease process/condition, diagnostic tests and medications  Outcome: Progressing  Note: Pt participating in plan of care with questions for care team. Plan of care reviewed with patient at bedside, all questions addressed     Problem: Pain - Standard  Goal: Alleviation of pain or a reduction in pain to the patient’s comfort goal  Outcome: Progressing  Note: Pt  able to verbalize pain on 0-10 scale when prompted. Pt medicated per MAR, and given comfort measures of repositioning with pillows.        Patient is not progressing towards the following goals:

## 2024-09-18 NOTE — PROGRESS NOTES
Pt. D/c'd with home. Discharge teaching completed, no further questions or concerns. All personal belongings with pt. Pt is A&OX4. No signs of distress. All lines removed, tele box removed and returned, and monitors notified.

## 2024-09-18 NOTE — PROGRESS NOTES
Bedside report received from Naya ORR. Pt assessment complete. Pt AO x 4. Reviewed plan of care with pt. Pt is tele monitored. Chart and labs reviewed. Bed in lowest position, and 3 side rails up. Pt educated on call lights, call light within reach. Hourly rounding in place

## 2024-09-18 NOTE — DISCHARGE SUMMARY
"PRIMARY DISCHARGE DIAGNOSIS: Status post MitraClip X 1 XT W    PROCEDURES:    1. Successful MARTIN with MitraClip XTW clip, reduction of mitral regurgitation from 4+ to 1+ on 9/17/2024  2. Intraoperative transesophageal echocardiogram showing results pending  3. CXR on 9/18/2024 showing no acute process.  Stable pulmonary fibrosis.  4. EKG on 9/18/2024 showing atrial fibrillation at rate of 70  5. Labs    Recent Labs     09/17/24  0911 09/18/24  0250   WBC 7.2 10.1   RBC 4.05* 3.79*   HEMOGLOBIN 12.1* 11.6*   HEMATOCRIT 37.6* 35.2*   MCV 92.8 92.9   MCH 29.9 30.6   RDW 58.4* 58.4*   PLATELETCT 136* 131*   MPV 10.3 9.8     Recent Labs     09/17/24  0911 09/18/24  0250   SODIUM 136 136   POTASSIUM 4.3 4.0   CHLORIDE 102 100   CO2 23 27   GLUCOSE 247* 118*   BUN 15 16     INR   Date Value Ref Range Status   09/13/2024 1.28 (H) 0.87 - 1.13 Final     Comment:     INR - Non-therapeutic Reference Range: 0.87-1.13  INR - Therapeutic Reference Range: 2.0-4.0       No results found for: \"POCINR\"    HOSPITAL COURSE: The patient is a pleasant 83 year old male with severe symptomatic mitral regurgitation, persistent AF on Eliquis, diabetes type 2, hyperlipidemia, hypothyroidism. Due to the patient's symptoms, the patient underwent successful MitraClip described as above. Post-procedure, the patient did well. They did require IV diuresis during their stay but will not continue on oral diuretics post-operatively due to euvolemia.  He did have evidence of NSVT on his telemetry monitor overnight, because of this he will be discharged home with a 14-day Zio patch.  They were able to ambulate without difficulty.  No further events were noted during their stay. They are now off oxygen and are to be discharged to home.    DISCHARGE MEDICATIONS:      Medication List        CONTINUE taking these medications        Instructions   acetaminophen 500 MG Tabs  Commonly known as: Tylenol   Take 1,000 mg by mouth every 6 hours as needed for " Moderate Pain.  Dose: 1,000 mg     ascorbic acid 500 MG tablet  Commonly known as: Vitamin C   Take 500 mg by mouth 2 times a day.  Dose: 500 mg     BERBERINE HCI PO   Take 1 Dose by mouth 2 times a day.  Dose: 1 Dose     Coral Calcium 1000 (390 Ca) MG Tabs   Take 1,000 mg by mouth 2 times a day.  Dose: 1,000 mg     Eliquis 5 MG Tabs  Generic drug: apixaban   Take 1 Tablet by mouth 2 times a day.  Dose: 5 mg     ferrous sulfate 325 (65 Fe) MG tablet   Take 1 Tablet by mouth every day. Only taking 3 times a week  Dose: 325 mg     FreeStyle Lancets Misc   USE AS DIRECTED ONCE DAILY     FREESTYLE LITE strip  Generic drug: glucose blood   USE AS DIRECTED TO CHECK BLOOD SUGAR ONCE DAILY     furosemide 20 MG Tabs  Commonly known as: Lasix   TAKE 1 TABLET BY MOUTH EVERY DAY  Dose: 20 mg     Jardiance 10 MG Tabs tablet  Generic drug: Empagliflozin   Take 1 Tablet by mouth every day.  Dose: 1 Tablet     metformin 1000 MG tablet  Commonly known as: Glucophage   Take 1 Tablet by mouth 2 times a day with meals.  Dose: 1,000 mg     metoprolol tartrate 50 MG Tabs  Commonly known as: Lopressor   Take 1 Tablet by mouth 2 times a day.  Dose: 50 mg     Non Formulary Request   Take 2 Tablets by mouth 2 times a day. Joint supplement  Dose: 2 Tablet     Non Formulary Request   Take 390 mg by mouth every day. Raw thyroid synagistics complex  Dose: 390 mg     rosuvastatin 20 MG Tabs  Commonly known as: Crestor   Take 1 Tablet by mouth every evening.  Dose: 20 mg     vitamin D3 1000 Unit (25 mcg) Tabs  Commonly known as: Cholecalciferol   Take 5,000 Units by mouth 2 (two) times a day.  Dose: 5,000 Units     zinc sulfate 220 (50 Zn) MG Caps  Commonly known as: Zincate   Take 50 mg by mouth every evening.  Dose: 50 mg              DISCHARGE INSTRUCTIONS: They are given discharge instructions on potential post-operative complications and symptoms to watch out for. Their groin sites were checked and were clean, dry, and intact. Patient or  family to notify us for any complications noted on the discharge instructions. They will follow up with myself, ANT Sweet, on Tuesday in our cardiology office. They will not need labs before their follow up appointment. They will then follow up with a repeat echocardiogram in one month for post MitraClip assessment.     Future Appointments   Date Time Provider Department Center   9/24/2024 12:45 PM [facility] None   10/15/2024  9:15 AM [facility] EXAM 8 NONINV [facility]   10/24/2024  1:45 PM [facility]ETH Fern[facility]dez None   8/12/2025 10:15 AM [facility] EXAM 9 ECHO [facility]   8/21/2025  9:45 AM [facility] None       Discharge time spent with patient was 31 minutes.

## 2024-09-18 NOTE — DISCHARGE PLANNING
HTH/SCP TCN chart review completed.  Per chart patient has a low LACE and high 6 clicks score, Noted per kardex patient ambulating 500 feet with no AD or assist and is currently on RA.  No TCN needs anticipated at this time. Should post acute discharge needs arise warranting TCN involvement, please contact TCN team via Voalte. Thank you.

## 2024-09-18 NOTE — DISCHARGE INSTRUCTIONS
Transcatheter Mitral Valve Repair, Care After  The following information offers guidance on how to care for yourself after your procedure. Your health care provider may also give you more specific instructions. If you have problems or questions, contact your health care provider.  What can I expect after the procedure?  After the procedure, it is common to have:  Mild pain or soreness at your incision site.  Tiredness (fatigue).  Follow these instructions at home:  Medicines  Take over-the-counter and prescription medicines only as told by your health care provider. You may be prescribed a blood thinning medicine if you were not already on one.  If you were prescribed an antibiotic medicine, take it as told by your health care provider. Do not stop taking the antibiotic even if you start to feel better.  Ask your health care provider if the medicine prescribed to you requires you to avoid driving or using machinery.  Ask your health care provider if you need to take antibiotics prior to any dental work.  If you are taking blood thinners:  Talk with your health care provider before taking any medicines that contain aspirin or NSAIDs, such as ibuprofen. These medicines increase your risk for dangerous bleeding.  Take your medicine exactly as told, at the same time every day.  Avoid activities that could cause injury or bruising, and follow instructions about how to prevent falls.  Wear a medical alert bracelet or carry a card that lists what medicines you take.  Incision care  Follow instructions from your health care provider about how to take care of your incision. Make sure you:  Wash your hands with soap and water for at least 20 seconds before and after you change your bandage (dressing). If soap and water are not available, use hand .  Change your dressing as told by your health care provider.  Leave stitches (sutures), skin glue, or adhesive strips in place. These skin closures may need to stay in  place for 2 weeks or longer. If adhesive strip edges start to loosen and curl up, you may trim the loose edges. Do not remove adhesive strips completely unless your health care provider tells you to do that.  Check your incision area every day for signs of infection. Check for:  Redness, swelling, or more pain.  Fluid or blood.  Warmth.  Pus or a bad smell.  A lump or bump.  Activity  Do not lift anything that is heavier than 10 lb (4.5 kg), or the limit that you are told, until your health care provider says that it is safe. This is typically for 1 week.  Avoid activities that take a lot of effort, such as exercise or sports, as told by your health care provider.  Avoid sexual activity until your health care provider says that it is safe.  Return to your normal activities as told by your health care provider. Ask your health care provider what activities are safe for you.  Lifestyle  Do not use any products that contain nicotine or tobacco. These products include cigarettes, chewing tobacco, and vaping devices, such as e-cigarettes. These can delay healing after surgery. If you need help quitting, ask your health care provider.  Avoid drinking alcohol.  General instructions    Do not take baths, swim, shower, or use a hot tub until your health care provider approves. You may only be allowed to take sponge baths.  Drink enough fluid to keep your urine pale yellow.  Follow instructions from your health care provider about eating or drinking restrictions. To prevent heart problems, you may need to follow a low-salt (low-sodium), low-fat diet.  Weigh yourself daily and monitor your weight for any changes.  Keep all follow-up visits. This is important.  Contact a health care provider if:  You have severe pain, and medicines do not help.  You have any of these signs of infection:  A fever.  Redness, swelling, or more pain in your incision area.  Fluid or blood coming from your incision.  Warmth coming from your  "incision.  Pus or a bad smell coming from your incision area.  A lump or bump at your incision area.  You have a sudden change in your weight.  You feel abnormal heartbeats.  Get help right away if:    You have chest pain.  You have problems breathing.  You feel dizzy or you faint.  You have any symptoms of a stroke. \"BE FAST\" is an easy way to remember the main warning signs of a stroke:  B - Balance. Signs are dizziness, sudden trouble walking, or loss of balance.  E - Eyes. Signs are trouble seeing or a sudden change in vision.  F - Face. Signs are sudden weakness or numbness of the face, or the face or eyelid drooping on one side.  A - Arms. Signs are weakness or numbness in an arm. This happens suddenly and usually on one side of the body.  S - Speech. Signs are sudden trouble speaking, slurred speech, or trouble understanding what people say.  T - Time. Time to call emergency services. Write down what time symptoms started.  You have other signs of a stroke, such as:  A sudden, severe headache with no known cause.  Nausea or vomiting.  Seizure.  These symptoms may represent a serious problem that is an emergency. Do not wait to see if the symptoms will go away. Get medical help right away. Call your local emergency services (911 in the [place] Do not drive yourself to the hospital.  Summary  After the procedure, it is common to have mild pain and soreness at your incision site.  Check your incision area every day for signs of infection.  Take over-the-counter and prescription medicines only as told by your health care provider.  Keep all follow-up visits. This is important.  This information is not intended to replace advice given to you by your health care provider. Make sure you discuss any questions you have with your health care provider.  Document Revised: 09/08/2021 Document Reviewed: 09/08/2021  Elsevier Patient Education © 2023 [employer]    "

## 2024-09-18 NOTE — PROGRESS NOTES
Zio heart monitor applied to patient. Patient educated on use. Patient demonstrated verbal understanding. No further questions. Zio box, booklet, and instructions left at patient's bedside table.

## 2024-09-18 NOTE — THERAPY
Physical Therapy   Initial Evaluation     Patient Name: David Owen  Age:  83 y.o., Sex:  male  Medical Record #: [id number]  Today's Date: 9/18/2024     Precautions  Precautions: (P) Fall Risk    Assessment    83 y.o. male was admitted for MARTIN.  PMHx includes a fib, hypothyroidism, and DM.  He lives with his wife in a 1st floor apartment and was independent for mobility without an AD.  On eval, he was able to mobilize x500' without an AD, RPE =10, talk test (-).  He was given a hand out and educated on the walking program, RPE scale, and talk test.  He reports he has a small gym at his apartments and pt was educated on progressing the Treadmill or walking in the water for extra resistance to get in to the 11-13 range on the RPE scale.  He was also advised to always attach the emergency key prior to starting the Treadmill.  He has no further acute PT needs.  Recommend outpatient cardiac rehab.    Plan    Physical Therapy Initial Treatment Plan   Duration: (P) Discharge Needs Only    DC Equipment Recommendations: (P) None  Discharge Recommendations: (P) Recommend outpatient physical therapy services to address higher level deficits (cardiac rehab)            Objective       09/18/24 0806   Initial Contact Note    Initial Contact Note Order Received and Verified, Physical Therapy Evaluation in Progress with Full Report to Follow.   Precautions   Precautions Fall Risk   Vitals   Pulse 89  (rest, 67-93 /p ambulation)   Blood Pressure  95/67  (rest, 111/68 /p ambulation)   Room Air Oximetry 90   O2 Delivery Device None - Room Air   Pain 0 - 10 Group   Therapist Pain Assessment Post Activity Pain Same as Prior to Activity  (no complaints of pain)   Prior Living Situation   Housing / Facility 1 Story Apartment / Condo  (1st floor)   Steps Into Home 1  (from garage)   Bathroom Set up Bathtub / Shower Combination   Equipment Owned Single Point Cane;Wheelchair   Lives with - Patient's Self Care Capacity Spouse    Prior Level of Functional Mobility   Bed Mobility Independent   Transfer Status Independent   Ambulation Independent   Ambulation Distance community   Assistive Devices Used None   Stairs Independent   Comments drives, doesn't work   History of Falls   History of Falls No   Date of Last Fall   (Pt reports no falls x1 year)   Cognition    Cognition / Consciousness WDL   Level of Consciousness Alert   Active ROM Lower Body    Active ROM Lower Body  WDL   Strength Lower Body   Lower Body Strength  WDL   Sensation Lower Body   Lower Extremity Sensation   WDL   Comments Pt reports neuropathy B feet   Balance Assessment   Sitting Balance (Static) Fair +   Sitting Balance (Dynamic) Fair   Standing Balance (Static) Fair   Standing Balance (Dynamic) Fair   Weight Shift Sitting Fair   Weight Shift Standing Fair   Comments without AD   Bed Mobility    Supine to Sit Supervised   Sit to Supine Supervised   Scooting Supervised   Rolling Supervised   Comments bed flat, no rail   Gait Analysis   Gait Level Of Assist Supervised   Assistive Device None   Distance (Feet) 500   # of Times Distance was Traveled 1   Deviation   (decreased B step length improving to full step thru gait with increase distance)   Weight Bearing Status FWB BLEs   Functional Mobility   Sit to Stand Supervised   Bed, Chair, Wheelchair Transfer Supervised   Transfer Method Stand Step   Mobility without AD   Activity Tolerance   Sitting Edge of Bed pre and post session   Edema / Skin Assessment   Edema / Skin  Not Assessed   Education Group   Education Provided Cardiac Precautions;Role of Physical Therapist   Cardiac Precautions Patient Response Patient;Acceptance;Explanation;Handout;Action Demonstration   Role of Physical Therapist Patient Response Patient;Acceptance;Explanation;Action Demonstration   Physical Therapy Initial Treatment Plan    Duration Discharge Needs Only   Anticipated Discharge Equipment and Recommendations   DC Equipment Recommendations  None   Discharge Recommendations Recommend outpatient physical therapy services to address higher level deficits  (cardiac rehab)   Interdisciplinary Plan of Care Collaboration   IDT Collaboration with  Nursing   Patient Position at End of Therapy Edge of Bed;Call Light within Reach;Tray Table within Reach   Collaboration Comments RN updated   Session Information   Date / Session Number  9/18- dc needs only

## 2024-09-19 LAB — LV EJECT FRACT  99904: 55

## 2024-09-24 ENCOUNTER — OFFICE VISIT (OUTPATIENT)
Dept: CARDIOLOGY | Facility: MEDICAL CENTER | Age: 84
End: 2024-09-24
Payer: MEDICARE

## 2024-09-24 VITALS
BODY MASS INDEX: 24.14 KG/M2 | WEIGHT: 163 LBS | SYSTOLIC BLOOD PRESSURE: 104 MMHG | HEART RATE: 75 BPM | OXYGEN SATURATION: 97 % | DIASTOLIC BLOOD PRESSURE: 72 MMHG | RESPIRATION RATE: 12 BRPM | HEIGHT: 69 IN

## 2024-09-24 DIAGNOSIS — I07.1 MODERATE TRICUSPID REGURGITATION: ICD-10-CM

## 2024-09-24 DIAGNOSIS — I48.11 LONGSTANDING PERSISTENT ATRIAL FIBRILLATION (HCC): Chronic | ICD-10-CM

## 2024-09-24 DIAGNOSIS — R93.1 AGATSTON CORONARY ARTERY CALCIUM SCORE BETWEEN 100 AND 400: ICD-10-CM

## 2024-09-24 DIAGNOSIS — Z95.2 S/P TRANSCATHETER MITRAL VALVE REPLACEMENT (TMVR): ICD-10-CM

## 2024-09-24 DIAGNOSIS — D68.69 SECONDARY HYPERCOAGULABLE STATE (HCC): ICD-10-CM

## 2024-09-24 DIAGNOSIS — E78.00 PURE HYPERCHOLESTEROLEMIA: ICD-10-CM

## 2024-09-24 PROCEDURE — 3074F SYST BP LT 130 MM HG: CPT

## 2024-09-24 PROCEDURE — 3078F DIAST BP <80 MM HG: CPT

## 2024-09-24 PROCEDURE — 99213 OFFICE O/P EST LOW 20 MIN: CPT

## 2024-09-24 PROCEDURE — 99214 OFFICE O/P EST MOD 30 MIN: CPT

## 2024-09-24 RX ORDER — AMOXICILLIN 500 MG/1
2000 TABLET, FILM COATED ORAL
Qty: 4 TABLET | Refills: 0 | Status: SHIPPED | OUTPATIENT
Start: 2024-09-24

## 2024-09-24 ASSESSMENT — ENCOUNTER SYMPTOMS
GASTROINTESTINAL NEGATIVE: 1
CONSTITUTIONAL NEGATIVE: 1
SHORTNESS OF BREATH: 0
MUSCULOSKELETAL NEGATIVE: 1
NERVOUS/ANXIOUS: 0
ORTHOPNEA: 0
DEPRESSION: 0
NEUROLOGICAL NEGATIVE: 1
PALPITATIONS: 0
EYES NEGATIVE: 1
PND: 0

## 2024-09-24 ASSESSMENT — FIBROSIS 4 INDEX: FIB4 SCORE: 3.25

## 2024-09-24 NOTE — PROGRESS NOTES
Chief Complaint   Patient presents with    Atrial Fibrillation     F/V DX:  Longstanding persistent atrial fibrillation (HCC)    Hyperlipidemia     F/V DX: Pure hypercholesterolemia       Subjective     David Owen is a 83 y.o. male who presents today for his 1 week post mitraClip appointment. He is s/p successful MARTIN with MitraClip XTW clip, reduction of mitral regurgitation from 4+ to 1+ on 9/17/2024. He has a history of severe symptomatic mitral regurgitation, persistent AF on Eliquis, diabetes type 2, hyperlipidemia, hypothyroidism.     Today 9/24/2024 he has been doing really well since his clip, he is able to walk about 4000 steps a day. NYHA class I symptoms    Past Medical History:   Diagnosis Date    Anginal syndrome ([facility])     Arrhythmia 06/21/2024 2023, afib, medicated    Arthritis 11/01/2023    hands    Breath shortness 06/21/2024    with exertion    Cataract 06/21/2024    IOLOD    Diabetes ([facility]) 06/21/2024    medicated    Disorder of thyroid 06/21/2024    medicated    Heart valve disease 06/21/2024    mitral/tricuspid valve leakage    Hemorrhagic disorder ([facility]) 06/21/2024    eliquis    High cholesterol 06/21/2024    medicated    Hypertension 06/21/2024    medicated    Pneumonia 06/21/2024    history of     Past Surgical History:   Procedure Laterality Date    TRANSCATHETER MITRAL VALVE REPAIR Right 9/17/2024    Procedure: TRANSCATHETER MITRAL VALVE PROCEDURE;  Surgeon: Jamir Antonio M.D.;  Location: SURGERY [facility];  Service: Cardiac    ECHOCARDIOGRAM, TRANSESOPHAGEAL, INTRAOPERATIVE N/A 9/17/2024    Procedure: ECHOCARDIOGRAM, TRANSESOPHAGEAL, INTRAOPERATIVE;  Surgeon: Jamir Antonio M.D.;  Location: SURGERY [facility];  Service: Cardiac    INGUINAL HERNIA REPAIR ROBOTIC XI Right 6/28/2024    Procedure: ROBOTIC RIGHT INGUINAL HERNIA REPAIR WITH MESH;  Surgeon: Barrie Watson M.D.;  Location: SURGERY [facility];  Service: Gen Robotic    OTHER  2022    IOL right     OTHER  2020    kidney stones/stent    OTHER CARDIAC SURGERY  2020    angiogram    OTHER  2017    tubes in ears bilat.    OTHER ORTHOPEDIC SURGERY      rt. shoulder rotator cuff repair    OTHER ORTHOPEDIC SURGERY      rt. knee arthroscopy    OTHER ORTHOPEDIC SURGERY      lt shoulder repair    OTHER ORTHOPEDIC SURGERY  1973    right hand tendon repair    OTHER ABDOMINAL SURGERY      appy    OTHER ORTHOPEDIC SURGERY      left index finger re-attached    OTHER      tonsills as child    OTHER ORTHOPEDIC SURGERY      left tendon repair hand     Family History   Problem Relation Age of Onset    No Known Problems Mother     Cancer Father     Heart Attack Brother 38     Social History     Socioeconomic History    Marital status:      Spouse name: Not on file    Number of children: Not on file    Years of education: Not on file    Highest education level: Not on file   Occupational History    Not on file   Tobacco Use    Smoking status: Former     Current packs/day: 0.00     Average packs/day: 1 pack/day for 20.0 years (20.0 ttl pk-yrs)     Types: Cigarettes     Start date:      Quit date:      Years since quittin.7    Smokeless tobacco: Never   Vaping Use    Vaping status: Never Used   Substance and Sexual Activity    Alcohol use: Not Currently     Comment: quit     Drug use: Never    Sexual activity: Yes     Partners: Female   Other Topics Concern    Not on file   Social History Narrative    Not on file     Social Determinants of Health     Financial Resource Strain: Not on file   Food Insecurity: Not on file   Transportation Needs: Not on file   Physical Activity: Not on file   Stress: Not on file   Social Connections: Not on file   Intimate Partner Violence: Not on file   Housing Stability: Not on file     No Known Allergies  Outpatient Encounter Medications as of 2024   Medication Sig Dispense Refill    Berberine Chloride (BERBERINE HCI PO) Take 1 Dose by mouth 2 times  a day.      acetaminophen (TYLENOL) 500 MG Tab Take 1,000 mg by mouth every 6 hours as needed for Moderate Pain.      metformin (GLUCOPHAGE) 1000 MG tablet Take 1 Tablet by mouth 2 times a day with meals. 200 Tablet 1    Non Formulary Request Take 390 mg by mouth every day. Raw thyroid synagistics complex      ELIQUIS 5 MG Tab Take 1 Tablet by mouth 2 times a day. 180 Tablet 1    ferrous sulfate 325 (65 Fe) MG tablet Take 1 Tablet by mouth every day. Only taking 3 times a week (Patient taking differently: Take 325 mg by mouth see administration instructions. 3 days a week) 100 Tablet 1    furosemide (LASIX) 20 MG Tab TAKE 1 TABLET BY MOUTH EVERY  Tablet 3    Empagliflozin (JARDIANCE) 10 MG Tab tablet Take 1 Tablet by mouth every day. 100 Tablet 1    FreeStyle Lancets Misc USE AS DIRECTED ONCE DAILY 100 Each 3    Non Formulary Request Take 2 Tablets by mouth 2 times a day. Joint supplement      rosuvastatin (CRESTOR) 20 MG Tab Take 1 Tablet by mouth every evening. 100 Tablet 3    metoprolol tartrate (LOPRESSOR) 50 MG Tab Take 1 Tablet by mouth 2 times a day. 200 Tablet 3    FREESTYLE LITE strip USE AS DIRECTED TO CHECK BLOOD SUGAR ONCE DAILY      Coral Calcium 1000 (390 Ca) MG Tab Take 1,000 mg by mouth 2 times a day.      zinc sulfate (ZINCATE) 220 (50 Zn) MG Cap Take 50 mg by mouth every evening.      ascorbic acid (VITAMIN C) 500 MG tablet Take 500 mg by mouth 2 times a day.      vitamin D3 (CHOLECALCIFEROL) 1000 Unit (25 mcg) Tab Take 5,000 Units by mouth 2 (two) times a day.       No facility-administered encounter medications on file as of 9/24/2024.     Review of Systems   Constitutional: Negative.    HENT: Negative.     Eyes: Negative.    Respiratory:  Negative for shortness of breath.    Cardiovascular:  Negative for chest pain, palpitations, orthopnea, leg swelling and PND.   Gastrointestinal: Negative.    Genitourinary: Negative.    Musculoskeletal: Negative.    Skin: Negative.    Neurological:  "Negative.    Endo/Heme/Allergies: Negative.    Psychiatric/Behavioral:  Negative for depression. The patient is not nervous/anxious.               Objective     /72 (BP Location: Left arm, Patient Position: Sitting, BP Cuff Size: Adult)   Pulse 75   Resp 12   Ht 1.753 m (5' 9\")   Wt 73.9 kg (163 lb)   SpO2 97%   BMI 24.07 kg/m²     Physical Exam  Constitutional:       Appearance: Normal appearance.   HENT:      Head: Normocephalic.   Neck:      Vascular: No JVD.   Cardiovascular:      Rate and Rhythm: Normal rate and regular rhythm.      Pulses: Normal pulses.      Heart sounds: Normal heart sounds. No murmur heard.     No friction rub.      Comments: Groin site is CDI with mild amount of bruising and small nodule  Pulmonary:      Effort: Pulmonary effort is normal.      Breath sounds: Normal breath sounds.   Abdominal:      Palpations: Abdomen is soft.   Musculoskeletal:         General: Normal range of motion.      Right lower leg: No edema.      Left lower leg: No edema.   Skin:     General: Skin is warm and dry.   Neurological:      General: No focal deficit present.      Mental Status: He is alert and oriented to person, place, and time.   Psychiatric:         Mood and Affect: Mood normal.         Behavior: Behavior normal.            Lab Results   Component Value Date/Time    CHOLSTRLTOT 118 04/18/2024 12:23 PM    LDL 49 04/18/2024 12:23 PM    HDL 47 04/18/2024 12:23 PM    TRIGLYCERIDE 108 04/18/2024 12:23 PM       Lab Results   Component Value Date/Time    SODIUM 136 09/18/2024 02:50 AM    POTASSIUM 4.0 09/18/2024 02:50 AM    CHLORIDE 100 09/18/2024 02:50 AM    CO2 27 09/18/2024 02:50 AM    GLUCOSE 118 (H) 09/18/2024 02:50 AM    BUN 16 09/18/2024 02:50 AM    CREATININE 0.82 09/18/2024 02:50 AM     Lab Results   Component Value Date/Time    ALKPHOSPHAT 43 09/18/2024 02:50 AM    ASTSGOT 22 09/18/2024 02:50 AM    ALTSGPT 18 09/18/2024 02:50 AM    TBILIRUBIN 0.6 09/18/2024 02:50 AM      Lab Results "   Component Value Date/Time    WBC 10.1 09/18/2024 02:50 AM    RBC 3.79 (L) 09/18/2024 02:50 AM    HEMOGLOBIN 11.6 (L) 09/18/2024 02:50 AM    HEMATOCRIT 35.2 (L) 09/18/2024 02:50 AM    MCV 92.9 09/18/2024 02:50 AM    MCH 30.6 09/18/2024 02:50 AM    MCHC 33.0 09/18/2024 02:50 AM    MPV 9.8 09/18/2024 02:50 AM    NEUTSPOLYS 58.70 09/13/2024 02:51 PM    LYMPHOCYTES 25.50 09/13/2024 02:51 PM    MONOCYTES 13.90 (H) 09/13/2024 02:51 PM    EOSINOPHILS 0.30 09/13/2024 02:51 PM    BASOPHILS 0.60 09/13/2024 02:51 PM       Intraoperative transesophageal echocardiogram showing   CONCLUSIONS  1)  Severe degenrative MR.  Central Jet at a2/p2.  MC placed at a2/p2   with excellent bridge.  Reduction in MR from severe to trace.  Mean   gradient:  4 mmHg.  Normalization of PVF.  2)  Ef 55%    CXR on 9/18/2024 showing no acute process.  Stable pulmonary fibrosis.    EKG 9/18/2024 showing atrial fibrillation at rate of 70       Assessment & Plan     1. S/P transcatheter mitral valve replacement (TMVR)        2. Moderate tricuspid regurgitation        3. Longstanding persistent atrial fibrillation (HCC)        4. Agatston coronary artery calcium score between 100 and 400        5. Pure hypercholesterolemia        6. Secondary hypercoagulable state (HCC)            Medical Decision Making: Today's Assessment/Status/Plan:        S/P MitraCLip x 1, XTW  -doing well post-op  -no ASA, on eliquis  -groins CDI with mild bruising and small nodule   -SBE prophylaxis understands  -echo 1 month with structural heart follow up  -reviewed hospital imaging, labs, and EKG  -cardiac rehab referral placed    Moderate TR  - surveillance with annual echocardiogram    Atrial fibrillation  -eliquis 5 mg BID  -metoprolol 20 mg BID  -Cardiac event monitor for 1 more week     Coronary Artery Disease, HLD  - LDL at goal of < 70, cont rosuvastatin 20 mg daily  We addressed the management of atherosclerotic artery disease.  He is on proper antiplatelet,  cholesterol management and beta-blockers as appropriate.  We addressed the potential side effects and laboratory follow-up for these medications.    Follow up with echocardiogram on 10/15 and myself on 10/24/2024    This note was dictated using Dragon speech recognition software.

## 2024-10-11 ENCOUNTER — TELEPHONE (OUTPATIENT)
Dept: CARDIOLOGY | Facility: MEDICAL CENTER | Age: 84
End: 2024-10-11
Payer: MEDICARE

## 2024-10-11 DIAGNOSIS — I48.11 LONGSTANDING PERSISTENT ATRIAL FIBRILLATION (HCC): Chronic | ICD-10-CM

## 2024-10-11 DIAGNOSIS — I47.29 NSVT (NONSUSTAINED VENTRICULAR TACHYCARDIA) (HCC): ICD-10-CM

## 2024-10-14 PROCEDURE — 93248 EXT ECG>7D<15D REV&INTERPJ: CPT | Performed by: INTERNAL MEDICINE

## 2024-10-15 ENCOUNTER — HOSPITAL ENCOUNTER (OUTPATIENT)
Dept: CARDIOLOGY | Facility: MEDICAL CENTER | Age: 84
End: 2024-10-15
Attending: INTERNAL MEDICINE
Payer: MEDICARE

## 2024-10-15 DIAGNOSIS — I34.0 SEVERE MITRAL REGURGITATION: ICD-10-CM

## 2024-10-15 PROCEDURE — 93306 TTE W/DOPPLER COMPLETE: CPT

## 2024-10-16 LAB
LV EJECT FRACT  99904: 50
LV EJECT FRACT MOD 2C 99903: 42.19
LV EJECT FRACT MOD 4C 99902: 50.15
LV EJECT FRACT MOD BP 99901: 45.83

## 2024-10-16 PROCEDURE — 93306 TTE W/DOPPLER COMPLETE: CPT | Mod: 26 | Performed by: INTERNAL MEDICINE

## 2024-10-24 ENCOUNTER — DOCUMENTATION (OUTPATIENT)
Dept: CARDIOLOGY | Facility: MEDICAL CENTER | Age: 84
End: 2024-10-24

## 2024-10-24 ENCOUNTER — OFFICE VISIT (OUTPATIENT)
Dept: CARDIOLOGY | Facility: MEDICAL CENTER | Age: 84
End: 2024-10-24
Payer: MEDICARE

## 2024-10-24 VITALS
RESPIRATION RATE: 18 BRPM | HEIGHT: 69 IN | WEIGHT: 168.8 LBS | BODY MASS INDEX: 25 KG/M2 | HEART RATE: 76 BPM | SYSTOLIC BLOOD PRESSURE: 104 MMHG | OXYGEN SATURATION: 95 % | DIASTOLIC BLOOD PRESSURE: 72 MMHG

## 2024-10-24 DIAGNOSIS — R93.1 AGATSTON CORONARY ARTERY CALCIUM SCORE BETWEEN 100 AND 400: ICD-10-CM

## 2024-10-24 DIAGNOSIS — E78.00 PURE HYPERCHOLESTEROLEMIA: ICD-10-CM

## 2024-10-24 DIAGNOSIS — I48.11 LONGSTANDING PERSISTENT ATRIAL FIBRILLATION (HCC): Chronic | ICD-10-CM

## 2024-10-24 DIAGNOSIS — I07.1 MODERATE TRICUSPID REGURGITATION: ICD-10-CM

## 2024-10-24 DIAGNOSIS — Z95.2 S/P TRANSCATHETER MITRAL VALVE REPLACEMENT (TMVR): ICD-10-CM

## 2024-10-24 DIAGNOSIS — D68.69 SECONDARY HYPERCOAGULABLE STATE (HCC): ICD-10-CM

## 2024-10-24 PROCEDURE — 99213 OFFICE O/P EST LOW 20 MIN: CPT

## 2024-10-24 PROCEDURE — 99214 OFFICE O/P EST MOD 30 MIN: CPT

## 2024-10-24 ASSESSMENT — ENCOUNTER SYMPTOMS
DEPRESSION: 0
CONSTITUTIONAL NEGATIVE: 1
NERVOUS/ANXIOUS: 0
NEUROLOGICAL NEGATIVE: 1
GASTROINTESTINAL NEGATIVE: 1
SHORTNESS OF BREATH: 0
PND: 0
EYES NEGATIVE: 1
PALPITATIONS: 0
ORTHOPNEA: 0

## 2024-10-24 ASSESSMENT — FIBROSIS 4 INDEX: FIB4 SCORE: 3.29

## 2024-12-09 RX ORDER — APIXABAN 5 MG/1
5 TABLET, FILM COATED ORAL 2 TIMES DAILY
Qty: 180 TABLET | Refills: 1 | Status: SHIPPED | OUTPATIENT
Start: 2024-12-09

## 2024-12-09 NOTE — TELEPHONE ENCOUNTER
Received request via: Pharmacy    Was the patient seen in the last year in this department? Yes    Does the patient have an active prescription (recently filled or refills available) for medication(s) requested? No    [facility]    Does the [facility] and need 100-day supply? (This applies to ALL medications) Yes, quantity updated to 100 days

## 2024-12-17 DIAGNOSIS — E78.00 PURE HYPERCHOLESTEROLEMIA: ICD-10-CM

## 2024-12-17 RX ORDER — ROSUVASTATIN CALCIUM 20 MG/1
20 TABLET, COATED ORAL EVERY EVENING
Qty: 100 TABLET | Refills: 3 | Status: SHIPPED | OUTPATIENT
Start: 2024-12-17

## 2024-12-17 NOTE — TELEPHONE ENCOUNTER
Is the patient due for a refill? Yes    Was the patient seen the past year? Yes    Date of last office visit: 10.24.2024    Does the patient have an upcoming appointment?  Yes   If yes, When? 04.24.2025    Provider to refill:LH    Does the patient have [facility] and need 100-day supply? (This applies to ALL medications) Yes, quantity updated to 100 days

## 2025-02-11 ENCOUNTER — OFFICE VISIT (OUTPATIENT)
Dept: INTERNAL MEDICINE | Facility: OTHER | Age: 85
End: 2025-02-11
Payer: MEDICARE

## 2025-02-11 VITALS
OXYGEN SATURATION: 93 % | HEART RATE: 77 BPM | BODY MASS INDEX: 24.79 KG/M2 | WEIGHT: 167.4 LBS | SYSTOLIC BLOOD PRESSURE: 131 MMHG | TEMPERATURE: 98.9 F | HEIGHT: 69 IN | DIASTOLIC BLOOD PRESSURE: 77 MMHG

## 2025-02-11 DIAGNOSIS — I48.11 LONGSTANDING PERSISTENT ATRIAL FIBRILLATION (HCC): Chronic | ICD-10-CM

## 2025-02-11 DIAGNOSIS — E11.9 TYPE 2 DIABETES MELLITUS WITHOUT COMPLICATION, WITHOUT LONG-TERM CURRENT USE OF INSULIN (HCC): Chronic | ICD-10-CM

## 2025-02-11 DIAGNOSIS — E03.9 HYPOTHYROIDISM, UNSPECIFIED TYPE: Chronic | ICD-10-CM

## 2025-02-11 DIAGNOSIS — E78.00 PURE HYPERCHOLESTEROLEMIA: ICD-10-CM

## 2025-02-11 PROCEDURE — 99213 OFFICE O/P EST LOW 20 MIN: CPT | Mod: GE

## 2025-02-11 PROCEDURE — 3075F SYST BP GE 130 - 139MM HG: CPT | Mod: GE

## 2025-02-11 PROCEDURE — 3078F DIAST BP <80 MM HG: CPT | Mod: GE

## 2025-02-11 ASSESSMENT — ENCOUNTER SYMPTOMS
ABDOMINAL PAIN: 0
FEVER: 0
WEIGHT LOSS: 0
SORE THROAT: 0
CONSTIPATION: 0
FALLS: 0
WEAKNESS: 0
DOUBLE VISION: 0
WHEEZING: 0
HALLUCINATIONS: 0
COUGH: 0
CHILLS: 0
PALPITATIONS: 0
HEADACHES: 0
EYE PAIN: 0
DEPRESSION: 0
SEIZURES: 0
HEARTBURN: 0
NERVOUS/ANXIOUS: 0
DIZZINESS: 0
TREMORS: 0
MEMORY LOSS: 0
INSOMNIA: 0
SHORTNESS OF BREATH: 0
DIARRHEA: 0
FOCAL WEAKNESS: 0
MYALGIAS: 0
SINUS PAIN: 0
EYE REDNESS: 0
ORTHOPNEA: 0
PND: 0
BRUISES/BLEEDS EASILY: 0
BLURRED VISION: 0

## 2025-02-11 ASSESSMENT — PATIENT HEALTH QUESTIONNAIRE - PHQ9: CLINICAL INTERPRETATION OF PHQ2 SCORE: 0

## 2025-02-11 ASSESSMENT — FIBROSIS 4 INDEX: FIB4 SCORE: 3.29

## 2025-02-11 NOTE — PROGRESS NOTES
Established Patient    Patient Care Team:  Demario Mo M.D. as PCP - General (Internal Medicine)    HPI:  David Owen is a 84 y.o. male with relevant past medical history of  type 2 diabetes without complication, hypothyroid, hyperlipidemia, and longstanding persistent A-fib on anticoagulation S/p mitral valve replacement and CAD who presents today to re-establish care with PCP.    The patient underwent mitral valve replacement in September 2024 and has been under cardiology follow-up. At today's visit, he reports feeling well without any complaints. He denies chest pain, palpitations, shortness of breath, dizziness, or headaches. He also reports being very compliant with his medication regimen, which was reconciled during this visit. His last A1c was 8.4% on 09/13/2024. As per his previous PCP, this level was considered appropriate given his age and associated risk factors, balancing glycemic control with fall risk reduction. The patient reports occasional mild lower extremity pain at night but does reports not requiring any medication for symptom relief. Other than this, he feels well overall without complaints.    Patient was offered pending vaccines but he refused.      Review of Systems   Constitutional:  Negative for chills, fever and weight loss.   HENT:  Negative for hearing loss, sinus pain, sore throat and tinnitus.    Eyes:  Negative for blurred vision, double vision, pain and redness.   Respiratory:  Negative for cough, shortness of breath and wheezing.    Cardiovascular:  Negative for chest pain, palpitations, orthopnea, leg swelling and PND.   Gastrointestinal:  Negative for abdominal pain, constipation, diarrhea and heartburn.   Genitourinary:  Negative for dysuria and hematuria.   Musculoskeletal:  Negative for falls, joint pain and myalgias.        Mild leg pain   Skin:  Negative for itching and rash.   Neurological:  Negative for dizziness, tremors, focal  weakness, seizures, weakness and headaches.   Endo/Heme/Allergies:  Does not bruise/bleed easily.   Psychiatric/Behavioral:  Negative for depression, hallucinations and memory loss. The patient is not nervous/anxious and does not have insomnia.    :    Past Medical History:   Diagnosis Date    Anginal syndrome (HCC)     Arrhythmia 2024, afib, medicated    Arthritis 2023    hands    Breath shortness 2024    with exertion    Cataract 2024    IOLOD    Diabetes ([facility]) 2024    medicated    Disorder of thyroid 2024    medicated    Heart valve disease 2024    mitral/tricuspid valve leakage    Hemorrhagic disorder ([facility]) 2024    eliquis    High cholesterol 2024    medicated    Hypertension 2024    medicated    Pneumonia 2024    history of       Social History     Tobacco Use    Smoking status: Former     Current packs/day: 0.00     Average packs/day: 1 pack/day for 20.0 years (20.0 ttl pk-yrs)     Types: Cigarettes     Start date:      Quit date:      Years since quittin.1    Smokeless tobacco: Never   Vaping Use    Vaping status: Never Used   Substance Use Topics    Alcohol use: Not Currently     Comment: quit     Drug use: Never       Current Outpatient Medications   Medication Sig Dispense Refill    rosuvastatin (CRESTOR) 20 MG Tab TAKE 1 TABLET BY MOUTH EVERY EVENING 100 Tablet 3    ELIQUIS 5 MG Tab Take 1 Tablet by mouth 2 times a day. 180 Tablet 1    Berberine Chloride (BERBERINE HCI PO) Take 1 Dose by mouth 2 times a day.      acetaminophen (TYLENOL) 500 MG Tab Take 1,000 mg by mouth every 6 hours as needed for Moderate Pain.      metformin (GLUCOPHAGE) 1000 MG tablet Take 1 Tablet by mouth 2 times a day with meals. 200 Tablet 1    Non Formulary Request Take 390 mg by mouth every day. Raw thyroid synagistics complex      ferrous sulfate 325 (65 Fe) MG tablet Take 1 Tablet by mouth every day. Only taking 3 times a week (Patient  "taking differently: Take 325 mg by mouth see administration instructions. 3 days a week) 100 Tablet 1    furosemide (LASIX) 20 MG Tab TAKE 1 TABLET BY MOUTH EVERY  Tablet 3    Empagliflozin (JARDIANCE) 10 MG Tab tablet Take 1 Tablet by mouth every day. 100 Tablet 1    FreeStyle Lancets Misc USE AS DIRECTED ONCE DAILY 100 Each 3    Non Formulary Request Take 2 Tablets by mouth 2 times a day. Joint supplement      metoprolol tartrate (LOPRESSOR) 50 MG Tab Take 1 Tablet by mouth 2 times a day. (Patient taking differently: Take 25 mg by mouth 2 times a day.) 200 Tablet 3    FREESTYLE LITE strip USE AS DIRECTED TO CHECK BLOOD SUGAR ONCE DAILY      Coral Calcium 1000 (390 Ca) MG Tab Take 1,000 mg by mouth 2 times a day.      zinc sulfate (ZINCATE) 220 (50 Zn) MG Cap Take 50 mg by mouth every evening.      ascorbic acid (VITAMIN C) 500 MG tablet Take 500 mg by mouth 2 times a day.      vitamin D3 (CHOLECALCIFEROL) 1000 Unit (25 mcg) Tab Take 5,000 Units by mouth 2 (two) times a day.       No current facility-administered medications for this visit.       /77 (BP Location: Left arm, Patient Position: Sitting, BP Cuff Size: Adult)   Pulse 77   Temp 37.2 °C (98.9 °F) (Temporal)   Ht 1.741 m (5' 8.55\")   Wt 75.9 kg (167 lb 6.4 oz)   SpO2 93%   BMI 25.05 kg/m²   Physical Exam  Constitutional:       General: He is not in acute distress.     Appearance: Normal appearance.   HENT:      Head: Normocephalic.      Right Ear: Tympanic membrane normal.      Left Ear: Tympanic membrane normal.      Nose: Nose normal. No congestion or rhinorrhea.      Mouth/Throat:      Mouth: Mucous membranes are moist.   Eyes:      General: No scleral icterus.     Extraocular Movements: Extraocular movements intact.      Conjunctiva/sclera: Conjunctivae normal.      Pupils: Pupils are equal, round, and reactive to light.   Cardiovascular:      Rate and Rhythm: Normal rate and regular rhythm.      Pulses: Normal pulses.      Heart " sounds: Normal heart sounds. No murmur heard.     No gallop.   Pulmonary:      Effort: Pulmonary effort is normal. No respiratory distress.      Breath sounds: Normal breath sounds. No wheezing, rhonchi or rales.   Chest:      Chest wall: No tenderness.   Abdominal:      General: Bowel sounds are normal. There is no distension.      Palpations: There is no mass.      Tenderness: There is no abdominal tenderness. There is no guarding or rebound.   Genitourinary:     Rectum: Normal.   Musculoskeletal:         General: No swelling or deformity. Normal range of motion.      Cervical back: Normal range of motion and neck supple.      Right lower leg: No edema.      Left lower leg: No edema.   Lymphadenopathy:      Cervical: No cervical adenopathy.   Skin:     General: Skin is warm.      Capillary Refill: Capillary refill takes less than 2 seconds.      Coloration: Skin is not jaundiced or pale.      Findings: No bruising, erythema or rash.   Neurological:      General: No focal deficit present.      Mental Status: He is alert and oriented to person, place, and time.      Sensory: No sensory deficit.      Motor: No weakness.      Gait: Gait normal.      Deep Tendon Reflexes: Reflexes normal.   Psychiatric:         Mood and Affect: Mood normal.         Behavior: Behavior normal.         Thought Content: Thought content normal.         Judgment: Judgment normal.           Assessment and Plan:     1. Longstanding persistent atrial fibrillation (HCC)  The patient has a longstanding history of atrial fibrillation and is currently under cardiology follow-up. He reports being very compliant with his medication regimen, including metoprolol and apixaban.  Denies any palpitation, shortness of breath, chest pain or dizziness.  He is status post mitral valve replacement in September 2024. His most recent echocardiogram demonstrates good function of the prosthetic mitral valve with only mild trace regurgitation. The patient remains  clinically stable, and no new concerns were reported at this visit. He will continue routine follow-up with cardiology for ongoing management.    Plan:  - Continue metoprolol  - Continue apixaban  - Continue follow-up with cardiology    2. Type 2 diabetes mellitus without complication, without long-term current use of insulin (HCC)  The patient has a longstanding history of Type 2 diabetes mellitus, currently managed with dual oral antidiabetic therapy. His most recent A1c was 8.4%, and per his previous PCP, this level was considered stable given his individual risk factors for hypoglycemia. After discussion, the patient is not interested in adding additional medications to his regimen at this time. It was explained that in the setting of coronary artery disease, guidelines recommend targeting an A1c of <7.5%, but given his age and clinical context, individualized flexibility is also appropriate. The patient denies any side effects from his current medications. His glucose levels will be monitored with a repeat A1c, and further treatment adjustments will be discussed as needed.      Plan:  - Continue metformin  -Continue empagliflozin    3. Hypothyroidism, unspecified type  The patient has a longstanding history of hypothyroidism. His last TSH measurement was two years ago and was within normal ranges. To ensure continued thyroid function stability, a repeat TSH will be obtained, and results will be reviewed at his next visit. Further management will be adjusted based on laboratory findings and clinical symptoms.    Plan:  - TSH ordered Future  - Continue home medications    4. Pure hypercholesterolemia  The patient is closely followed by cardiology and is currently on rosuvastatin 20 mg daily. He reports excellent medication adherence and denies any side effects. His most recent lipid panel demonstrates LDL levels at goal (<70 mg/dL). Given his well-controlled lipid profile, he will continue his current statin  regimen, with ongoing monitoring as indicated.    Plan:  - Continue rosuvastatin 20 mg daily   - Lipid panel ordered  - Continue follow-up with cardiology       Return in about 3 months (around 5/5/2025).      Demario Ramirez M.D., MPH. PGY-1 Internal Medicine  [facility]    This note was created using voice recognition software.  While every attempt is made to ensure accuracy of transcription, occasionally errors occur.

## 2025-03-13 ENCOUNTER — HOSPITAL ENCOUNTER (OUTPATIENT)
Dept: LAB | Facility: MEDICAL CENTER | Age: 85
End: 2025-03-13
Payer: MEDICARE

## 2025-03-13 DIAGNOSIS — E03.9 HYPOTHYROIDISM, UNSPECIFIED TYPE: Chronic | ICD-10-CM

## 2025-03-13 DIAGNOSIS — E11.9 TYPE 2 DIABETES MELLITUS WITHOUT COMPLICATION, WITHOUT LONG-TERM CURRENT USE OF INSULIN (HCC): Chronic | ICD-10-CM

## 2025-03-13 LAB
BASOPHILS # BLD AUTO: 0.7 % (ref 0–1.8)
BASOPHILS # BLD: 0.04 K/UL (ref 0–0.12)
EOSINOPHIL # BLD AUTO: 0.02 K/UL (ref 0–0.51)
EOSINOPHIL NFR BLD: 0.4 % (ref 0–6.9)
ERYTHROCYTE [DISTWIDTH] IN BLOOD BY AUTOMATED COUNT: 63.1 FL (ref 35.9–50)
EST. AVERAGE GLUCOSE BLD GHB EST-MCNC: 192 MG/DL
HBA1C MFR BLD: 8.3 % (ref 4–5.6)
HCT VFR BLD AUTO: 40.8 % (ref 42–52)
HGB BLD-MCNC: 13.4 G/DL (ref 14–18)
IMM GRANULOCYTES # BLD AUTO: 0.05 K/UL (ref 0–0.11)
IMM GRANULOCYTES NFR BLD AUTO: 0.9 % (ref 0–0.9)
LYMPHOCYTES # BLD AUTO: 1.98 K/UL (ref 1–4.8)
LYMPHOCYTES NFR BLD: 36.7 % (ref 22–41)
MCH RBC QN AUTO: 30.2 PG (ref 27–33)
MCHC RBC AUTO-ENTMCNC: 32.8 G/DL (ref 32.3–36.5)
MCV RBC AUTO: 91.9 FL (ref 81.4–97.8)
MONOCYTES # BLD AUTO: 0.85 K/UL (ref 0–0.85)
MONOCYTES NFR BLD AUTO: 15.7 % (ref 0–13.4)
NEUTROPHILS # BLD AUTO: 2.46 K/UL (ref 1.82–7.42)
NEUTROPHILS NFR BLD: 45.6 % (ref 44–72)
NRBC # BLD AUTO: 0 K/UL
NRBC BLD-RTO: 0 /100 WBC (ref 0–0.2)
PLATELET # BLD AUTO: 163 K/UL (ref 164–446)
PMV BLD AUTO: 11 FL (ref 9–12.9)
RBC # BLD AUTO: 4.44 M/UL (ref 4.7–6.1)
WBC # BLD AUTO: 5.4 K/UL (ref 4.8–10.8)

## 2025-03-13 PROCEDURE — 80053 COMPREHEN METABOLIC PANEL: CPT

## 2025-03-13 PROCEDURE — 80061 LIPID PANEL: CPT

## 2025-03-13 PROCEDURE — 36415 COLL VENOUS BLD VENIPUNCTURE: CPT

## 2025-03-13 PROCEDURE — 84443 ASSAY THYROID STIM HORMONE: CPT

## 2025-03-13 PROCEDURE — 83036 HEMOGLOBIN GLYCOSYLATED A1C: CPT

## 2025-03-13 PROCEDURE — 82570 ASSAY OF URINE CREATININE: CPT

## 2025-03-13 PROCEDURE — 82043 UR ALBUMIN QUANTITATIVE: CPT

## 2025-03-13 PROCEDURE — 85025 COMPLETE CBC W/AUTO DIFF WBC: CPT

## 2025-03-14 LAB
ALBUMIN SERPL BCP-MCNC: 4.5 G/DL (ref 3.2–4.9)
ALBUMIN/GLOB SERPL: 1.6 G/DL
ALP SERPL-CCNC: 38 U/L (ref 30–99)
ALT SERPL-CCNC: 21 U/L (ref 2–50)
ANION GAP SERPL CALC-SCNC: 11 MMOL/L (ref 7–16)
AST SERPL-CCNC: 22 U/L (ref 12–45)
BILIRUB SERPL-MCNC: 0.6 MG/DL (ref 0.1–1.5)
BUN SERPL-MCNC: 17 MG/DL (ref 8–22)
CALCIUM ALBUM COR SERPL-MCNC: 9.4 MG/DL (ref 8.5–10.5)
CALCIUM SERPL-MCNC: 9.8 MG/DL (ref 8.5–10.5)
CHLORIDE SERPL-SCNC: 102 MMOL/L (ref 96–112)
CHOLEST SERPL-MCNC: 97 MG/DL (ref 100–199)
CO2 SERPL-SCNC: 25 MMOL/L (ref 20–33)
CREAT SERPL-MCNC: 0.87 MG/DL (ref 0.5–1.4)
CREAT UR-MCNC: 86.9 MG/DL
FASTING STATUS PATIENT QL REPORTED: NORMAL
GFR SERPLBLD CREATININE-BSD FMLA CKD-EPI: 85 ML/MIN/1.73 M 2
GLOBULIN SER CALC-MCNC: 2.8 G/DL (ref 1.9–3.5)
GLUCOSE SERPL-MCNC: 142 MG/DL (ref 65–99)
HDLC SERPL-MCNC: 46 MG/DL
LDLC SERPL CALC-MCNC: 34 MG/DL
MICROALBUMIN UR-MCNC: 2.8 MG/DL
MICROALBUMIN/CREAT UR: 32 MG/G (ref 0–30)
POTASSIUM SERPL-SCNC: 5 MMOL/L (ref 3.6–5.5)
PROT SERPL-MCNC: 7.3 G/DL (ref 6–8.2)
SODIUM SERPL-SCNC: 138 MMOL/L (ref 135–145)
TRIGL SERPL-MCNC: 83 MG/DL (ref 0–149)
TSH SERPL DL<=0.005 MIU/L-ACNC: 3.91 UIU/ML (ref 0.38–5.33)

## 2025-03-17 NOTE — TELEPHONE ENCOUNTER
Received request via: Patient    Was the patient seen in the last year in this department? Yes    Does the patient have an active prescription (recently filled or refills available) for medication(s) requested? No    [facility]    Does the patient have [facility] and need 100-day supply? (This applies to ALL medications) Yes

## 2025-03-18 DIAGNOSIS — I48.0 PAROXYSMAL ATRIAL FIBRILLATION (HCC): ICD-10-CM

## 2025-03-18 RX ORDER — APIXABAN 5 MG/1
5 TABLET, FILM COATED ORAL 2 TIMES DAILY
Qty: 180 TABLET | Refills: 1 | Status: SHIPPED | OUTPATIENT
Start: 2025-03-18

## 2025-03-18 NOTE — TELEPHONE ENCOUNTER
Is the patient due for a refill? Yes    Was the patient seen the last 15 months? Yes    Date of last office visit: 10/24/24    Does the patient have an upcoming appointment?  Yes   If yes, When? 4/24/25    Provider to refill:[facility]    Does the patient have [facility] and need 100-day supply? (This applies to ALL medications) Yes, quantity updated to 100 days

## 2025-03-19 RX ORDER — METOPROLOL TARTRATE 50 MG
50 TABLET ORAL 2 TIMES DAILY
Qty: 200 TABLET | Refills: 2 | Status: SHIPPED | OUTPATIENT
Start: 2025-03-19

## 2025-03-19 NOTE — TELEPHONE ENCOUNTER
Received request via: Pharmacy    Was the patient seen in the last year in this department? Yes    Does the patient have an active prescription (recently filled or refills available) for medication(s) requested? No    [facility]    Does the patient have [facility] and need 100-day supply? (This applies to ALL medications) Patient does not have [facility]

## 2025-03-19 NOTE — TELEPHONE ENCOUNTER
"Per last OV note with LH 10/24/24  \"Atrial fibrillation, possible NSVT  -eliquis 5 mg BID  -metoprolol 20 mg BID\"    Pt reports taking 25 mg BID     To LH - please clarify metoprolol dosing. Thank you!  "

## 2025-03-20 DIAGNOSIS — E11.9 TYPE 2 DIABETES MELLITUS WITHOUT COMPLICATION, WITHOUT LONG-TERM CURRENT USE OF INSULIN (HCC): ICD-10-CM

## 2025-03-20 DIAGNOSIS — I48.11 LONGSTANDING PERSISTENT ATRIAL FIBRILLATION (HCC): ICD-10-CM

## 2025-03-20 DIAGNOSIS — E11.9 TYPE 2 DIABETES MELLITUS WITHOUT COMPLICATION, UNSPECIFIED WHETHER LONG TERM INSULIN USE (HCC): ICD-10-CM

## 2025-03-21 RX ORDER — LANCETS 28 GAUGE
EACH MISCELLANEOUS
Qty: 100 EACH | Refills: 3 | OUTPATIENT
Start: 2025-03-21

## 2025-03-21 RX ORDER — BLOOD-GLUCOSE METER
KIT MISCELLANEOUS
Qty: 50 STRIP | OUTPATIENT
Start: 2025-03-21

## 2025-03-21 RX ORDER — EMPAGLIFLOZIN 10 MG/1
1 TABLET, FILM COATED ORAL
Qty: 100 TABLET | Refills: 1 | OUTPATIENT
Start: 2025-03-21

## 2025-03-22 DIAGNOSIS — E11.9 TYPE 2 DIABETES MELLITUS WITHOUT COMPLICATION, UNSPECIFIED WHETHER LONG TERM INSULIN USE (HCC): ICD-10-CM

## 2025-03-23 RX ORDER — LANCETS 28 GAUGE
EACH MISCELLANEOUS
Qty: 100 EACH | Refills: 3 | Status: SHIPPED | OUTPATIENT
Start: 2025-03-23

## 2025-03-24 DIAGNOSIS — I48.0 PAROXYSMAL ATRIAL FIBRILLATION (HCC): ICD-10-CM

## 2025-03-24 DIAGNOSIS — E11.9 TYPE 2 DIABETES MELLITUS WITHOUT COMPLICATION, UNSPECIFIED WHETHER LONG TERM INSULIN USE (HCC): ICD-10-CM

## 2025-03-25 RX ORDER — METOPROLOL TARTRATE 50 MG
50 TABLET ORAL 2 TIMES DAILY
Qty: 200 TABLET | Refills: 2 | OUTPATIENT
Start: 2025-03-25

## 2025-03-25 NOTE — TELEPHONE ENCOUNTER
Is the patient due for a refill? No    Was the patient seen the last 15 months? No    Date of last office visit: 10/24/2024    Does the patient have an upcoming appointment?  Yes   If yes, When? 04/24/2025    Provider to refill: LH     Does the patient have [facility] and need 100-day supply? (This applies to ALL medications) Yes, quantity updated to 100 days      
Refill request for metoprolol denied due to 9 month supply sent to requested pharmacy on 3/19/25.    
Preventive measure

## 2025-03-25 NOTE — TELEPHONE ENCOUNTER
Received request via: Patient    Was the patient seen in the last year in this department? Yes    Does the patient have an active prescription (recently filled or refills available) for medication(s) requested?  yes    Pharmacy Name: [facility]    Does the [facility] and need 100-day supply? (This applies to ALL medications) Yes, quantity updated to 100 days

## 2025-03-25 NOTE — TELEPHONE ENCOUNTER
Received request via: Patient    Was the patient seen in the last year in this department? Yes    Does the patient have an active prescription (recently filled or refills available) for medication(s) requested? No    [facility]    Does the [facility] and need 100-day supply? (This applies to ALL medications) Yes,

## 2025-03-26 RX ORDER — BLOOD-GLUCOSE METER
1 KIT MISCELLANEOUS 3 TIMES DAILY PRN
Qty: 100 STRIP | Refills: 3 | Status: SHIPPED | OUTPATIENT
Start: 2025-03-26

## 2025-04-03 ENCOUNTER — TELEPHONE (OUTPATIENT)
Dept: INTERNAL MEDICINE | Facility: OTHER | Age: 85
End: 2025-04-03
Payer: MEDICARE

## 2025-04-03 DIAGNOSIS — E11.9 TYPE 2 DIABETES MELLITUS WITHOUT COMPLICATION, WITHOUT LONG-TERM CURRENT USE OF INSULIN (HCC): Chronic | ICD-10-CM

## 2025-04-05 DIAGNOSIS — I48.11 LONGSTANDING PERSISTENT ATRIAL FIBRILLATION (HCC): ICD-10-CM

## 2025-04-05 DIAGNOSIS — E11.9 TYPE 2 DIABETES MELLITUS WITHOUT COMPLICATION, UNSPECIFIED WHETHER LONG TERM INSULIN USE (HCC): ICD-10-CM

## 2025-04-05 DIAGNOSIS — E03.9 HYPOTHYROIDISM, UNSPECIFIED TYPE: ICD-10-CM

## 2025-04-07 RX ORDER — FUROSEMIDE 20 MG/1
20 TABLET ORAL DAILY
Qty: 100 TABLET | Refills: 2 | Status: SHIPPED | OUTPATIENT
Start: 2025-04-07

## 2025-04-07 NOTE — TELEPHONE ENCOUNTER
Demario Mo M.D.  You15 hours ago (5:58 PM)     AS  Hi,    I ordered the generic strips.  Please, let me know in case he needs any new orders.    Thanks!

## 2025-04-22 ENCOUNTER — TELEPHONE (OUTPATIENT)
Dept: HEALTH INFORMATION MANAGEMENT | Facility: OTHER | Age: 85
End: 2025-04-22
Payer: MEDICARE

## 2025-04-24 ENCOUNTER — OFFICE VISIT (OUTPATIENT)
Dept: CARDIOLOGY | Facility: MEDICAL CENTER | Age: 85
End: 2025-04-24
Payer: MEDICARE

## 2025-04-24 VITALS
DIASTOLIC BLOOD PRESSURE: 64 MMHG | HEART RATE: 70 BPM | RESPIRATION RATE: 16 BRPM | WEIGHT: 168 LBS | HEIGHT: 68 IN | SYSTOLIC BLOOD PRESSURE: 110 MMHG | BODY MASS INDEX: 25.46 KG/M2 | OXYGEN SATURATION: 94 %

## 2025-04-24 DIAGNOSIS — Z95.2 S/P TRANSCATHETER MITRAL VALVE REPLACEMENT (TMVR): ICD-10-CM

## 2025-04-24 DIAGNOSIS — D68.69 SECONDARY HYPERCOAGULABLE STATE (HCC): ICD-10-CM

## 2025-04-24 DIAGNOSIS — E78.00 PURE HYPERCHOLESTEROLEMIA: ICD-10-CM

## 2025-04-24 DIAGNOSIS — I48.0 PAROXYSMAL ATRIAL FIBRILLATION (HCC): ICD-10-CM

## 2025-04-24 DIAGNOSIS — I07.1 MODERATE TRICUSPID REGURGITATION: ICD-10-CM

## 2025-04-24 DIAGNOSIS — R93.1 AGATSTON CORONARY ARTERY CALCIUM SCORE BETWEEN 100 AND 400: ICD-10-CM

## 2025-04-24 DIAGNOSIS — I48.11 LONGSTANDING PERSISTENT ATRIAL FIBRILLATION (HCC): Chronic | ICD-10-CM

## 2025-04-24 PROCEDURE — 99212 OFFICE O/P EST SF 10 MIN: CPT

## 2025-04-24 PROCEDURE — 99214 OFFICE O/P EST MOD 30 MIN: CPT

## 2025-04-24 PROCEDURE — 3078F DIAST BP <80 MM HG: CPT

## 2025-04-24 PROCEDURE — 3074F SYST BP LT 130 MM HG: CPT

## 2025-04-24 RX ORDER — METOPROLOL TARTRATE 25 MG/1
25 TABLET, FILM COATED ORAL 2 TIMES DAILY
Qty: 200 TABLET | Refills: 3 | Status: SHIPPED | OUTPATIENT
Start: 2025-04-24

## 2025-04-24 ASSESSMENT — ENCOUNTER SYMPTOMS
PND: 0
MUSCULOSKELETAL NEGATIVE: 1
PALPITATIONS: 0
DEPRESSION: 0
NERVOUS/ANXIOUS: 0
GASTROINTESTINAL NEGATIVE: 1
NEUROLOGICAL NEGATIVE: 1
ORTHOPNEA: 0
CONSTITUTIONAL NEGATIVE: 1
SHORTNESS OF BREATH: 0
EYES NEGATIVE: 1

## 2025-04-24 ASSESSMENT — FIBROSIS 4 INDEX: FIB4 SCORE: 2.44

## 2025-04-24 NOTE — PROGRESS NOTES
Chief Complaint   Patient presents with    Follow-Up     Longstanding persistent atrial fibrillation (HCC)    Other     S/P transcatheter mitral valve replacement (TMVR)       Subjective     David Owen is a 84 y.o. male who presents today for follow-up. He is s/p successful MARTIN with MitraClip on 9/17/2024. He has a history of severe symptomatic mitral regurgitation, persistent AF on Eliquis, diabetes type 2, hyperlipidemia, hypothyroidism.      Seen by myself on 9/24/2024 for his 1 week post clip visit he has been doing really well since his clip, he is able to walk about 4000 steps a day. NYHA class I symptoms.  Provided Rx for SBE prophylaxis     10/24/2024 he has been feeling well, he was exercising regularly but has been slowed down somewhat lately by knee pain.  He continues to walk and use the treadmill.  NYHA class I    4/24/2025 he has been doing well.  He reports that he has not been exercising like he should.  NYHA class I, denies any acute cardiovascular symptoms.  They are planning to move to [place] to be closer to their grandkids    Past Medical History:   Diagnosis Date    Anginal syndrome (HCC)     Arrhythmia 06/21/2024 2023, afib, medicated    Arthritis 11/01/2023    hands    Breath shortness 06/21/2024    with exertion    Cataract 06/21/2024    IOLOD    Diabetes (HCC) 06/21/2024    medicated    Disorder of thyroid 06/21/2024    medicated    Heart valve disease 06/21/2024    mitral/tricuspid valve leakage    Hemorrhagic disorder (HCC) 06/21/2024    eliquis    High cholesterol 06/21/2024    medicated    Hypertension 06/21/2024    medicated    Pneumonia 06/21/2024    history of     Past Surgical History:   Procedure Laterality Date    TRANSCATHETER MITRAL VALVE REPAIR Right 9/17/2024    Procedure: TRANSCATHETER MITRAL VALVE PROCEDURE;  Surgeon: Jamir Antonio M.D.;  Location: SURGERY [facility];  Service: Cardiac    ECHOCARDIOGRAM, TRANSESOPHAGEAL, INTRAOPERATIVE N/A 9/17/2024     Procedure: ECHOCARDIOGRAM, TRANSESOPHAGEAL, INTRAOPERATIVE;  Surgeon: Jamir Antonio M.D.;  Location: SURGERY [facility];  Service: Cardiac    INGUINAL HERNIA REPAIR ROBOTIC XI Right 2024    Procedure: ROBOTIC RIGHT INGUINAL HERNIA REPAIR WITH MESH;  Surgeon: Barrie Watson M.D.;  Location: SURGERY [facility];  Service: Gen Robotic    OTHER      IOL right    OTHER      kidney stones/stent    OTHER CARDIAC SURGERY      angiogram    OTHER      tubes in ears bilat.    OTHER ORTHOPEDIC SURGERY      rt. shoulder rotator cuff repair    OTHER ORTHOPEDIC SURGERY      rt. knee arthroscopy    OTHER ORTHOPEDIC SURGERY      lt shoulder repair    OTHER ORTHOPEDIC SURGERY      right hand tendon repair    OTHER ABDOMINAL SURGERY      appy    OTHER ORTHOPEDIC SURGERY      left index finger re-attached    OTHER      tonsills as child    OTHER ORTHOPEDIC SURGERY      left tendon repair hand     Family History   Problem Relation Age of Onset    No Known Problems Mother     Cancer Father     Heart Attack Brother 38     Social History     Socioeconomic History    Marital status:      Spouse name: Not on file    Number of children: Not on file    Years of education: Not on file    Highest education level: Not on file   Occupational History    Not on file   Tobacco Use    Smoking status: Former     Current packs/day: 0.00     Average packs/day: 1 pack/day for 20.0 years (20.0 ttl pk-yrs)     Types: Cigarettes     Start date:      Quit date:      Years since quittin.3    Smokeless tobacco: Never   Vaping Use    Vaping status: Never Used   Substance and Sexual Activity    Alcohol use: Not Currently     Comment: quit     Drug use: Never    Sexual activity: Yes     Partners: Female   Other Topics Concern    Not on file   Social History Narrative    Not on file     Social Drivers of Health     Financial Resource Strain: Not on file   Food Insecurity: Not on  file   Transportation Needs: Not on file   Physical Activity: Not on file   Stress: Not on file   Social Connections: Not on file   Intimate Partner Violence: Not on file   Housing Stability: Not on file     No Known Allergies  Outpatient Encounter Medications as of 4/24/2025   Medication Sig Dispense Refill    furosemide (LASIX) 20 MG Tab TAKE 1 TABLET BY MOUTH EVERY  Tablet 2    glucose blood strip 1 Strip by Other route as needed (for daily glycemia). 90 Strip 3    metformin (GLUCOPHAGE) 1000 MG tablet Take 1 Tablet by mouth 2 times a day with meals. 200 Tablet 1    FreeStyle Lancets Misc USE AS DIRECTED ONCE DAILY 100 Each 3    metoprolol tartrate (LOPRESSOR) 50 MG Tab TAKE 1 TABLET BY MOUTH TWICE DAILY (Patient taking differently: Take 50 mg by mouth 2 times a day. TAKING 0.5 TAB BID) 200 Tablet 2    ELIQUIS 5 MG Tab Take 1 Tablet by mouth 2 times a day. 180 Tablet 1    rosuvastatin (CRESTOR) 20 MG Tab TAKE 1 TABLET BY MOUTH EVERY EVENING 100 Tablet 3    Berberine Chloride (BERBERINE HCI PO) Take 1 Dose by mouth 2 times a day.      acetaminophen (TYLENOL) 500 MG Tab Take 1,000 mg by mouth every 6 hours as needed for Moderate Pain.      Non Formulary Request Take 390 mg by mouth every day. Raw thyroid synagistics complex      ferrous sulfate 325 (65 Fe) MG tablet Take 1 Tablet by mouth every day. Only taking 3 times a week (Patient taking differently: Take 325 mg by mouth see administration instructions. 3 days a week) 100 Tablet 1    Empagliflozin (JARDIANCE) 10 MG Tab tablet Take 1 Tablet by mouth every day. 100 Tablet 1    Non Formulary Request Take 2 Tablets by mouth 2 times a day. Joint supplement      Coral Calcium 1000 (390 Ca) MG Tab Take 1,000 mg by mouth 2 times a day.      zinc sulfate (ZINCATE) 220 (50 Zn) MG Cap Take 50 mg by mouth every evening.      ascorbic acid (VITAMIN C) 500 MG tablet Take 500 mg by mouth 2 times a day.      vitamin D3 (CHOLECALCIFEROL) 1000 Unit (25 mcg) Tab Take 5,000  "Units by mouth 2 (two) times a day.       No facility-administered encounter medications on file as of 4/24/2025.     Review of Systems   Constitutional: Negative.    HENT: Negative.     Eyes: Negative.    Respiratory:  Negative for shortness of breath.    Cardiovascular:  Negative for chest pain, palpitations, orthopnea, leg swelling and PND.   Gastrointestinal: Negative.    Genitourinary: Negative.    Musculoskeletal: Negative.    Skin: Negative.    Neurological: Negative.    Endo/Heme/Allergies: Negative.    Psychiatric/Behavioral:  Negative for depression. The patient is not nervous/anxious.               Objective     /64 (BP Location: Left arm, Patient Position: Sitting, BP Cuff Size: Adult)   Pulse 70   Resp 16   Ht 1.727 m (5' 8\")   Wt 76.2 kg (168 lb)   SpO2 94%   BMI 25.54 kg/m²     Physical Exam  Constitutional:       Appearance: Normal appearance.   HENT:      Head: Normocephalic.   Neck:      Vascular: No JVD.   Cardiovascular:      Rate and Rhythm: Normal rate and regular rhythm.      Pulses: Normal pulses.      Heart sounds: Normal heart sounds. No murmur heard.     No friction rub.   Pulmonary:      Effort: Pulmonary effort is normal.      Breath sounds: Normal breath sounds.   Abdominal:      Palpations: Abdomen is soft.   Musculoskeletal:         General: Normal range of motion.      Right lower leg: No edema.      Left lower leg: No edema.   Skin:     General: Skin is warm and dry.   Neurological:      General: No focal deficit present.      Mental Status: He is alert and oriented to person, place, and time.   Psychiatric:         Mood and Affect: Mood normal.         Behavior: Behavior normal.            Lab Results   Component Value Date/Time    WBC 5.4 03/13/2025 11:41 AM    RBC 4.44 (L) 03/13/2025 11:41 AM    HEMOGLOBIN 13.4 (L) 03/13/2025 11:41 AM    HEMATOCRIT 40.8 (L) 03/13/2025 11:41 AM    MCV 91.9 03/13/2025 11:41 AM    MCH 30.2 03/13/2025 11:41 AM    MCHC 32.8 03/13/2025 " 11:41 AM    MPV 11.0 03/13/2025 11:41 AM    NEUTSPOLYS 45.60 03/13/2025 11:41 AM    LYMPHOCYTES 36.70 03/13/2025 11:41 AM    MONOCYTES 15.70 (H) 03/13/2025 11:41 AM    EOSINOPHILS 0.40 03/13/2025 11:41 AM    BASOPHILS 0.70 03/13/2025 11:41 AM      Lab Results   Component Value Date/Time    CHOLSTRLTOT 97 (L) 03/13/2025 11:41 AM    LDL 34 03/13/2025 11:41 AM    HDL 46 03/13/2025 11:41 AM    TRIGLYCERIDE 83 03/13/2025 11:41 AM       Lab Results   Component Value Date/Time    SODIUM 138 03/13/2025 11:41 AM    POTASSIUM 5.0 03/13/2025 11:41 AM    CHLORIDE 102 03/13/2025 11:41 AM    CO2 25 03/13/2025 11:41 AM    GLUCOSE 142 (H) 03/13/2025 11:41 AM    BUN 17 03/13/2025 11:41 AM    CREATININE 0.87 03/13/2025 11:41 AM     Lab Results   Component Value Date/Time    ALKPHOSPHAT 38 03/13/2025 11:41 AM    ASTSGOT 22 03/13/2025 11:41 AM    ALTSGPT 21 03/13/2025 11:41 AM    TBILIRUBIN 0.6 03/13/2025 11:41 AM      Echocardiogram 10/15/2024  CONCLUSIONS  The ejection fraction is measured to be 50% by Boyle's biplane.  Known transcatheter mitral valve repair which is functioning normally   with appropriate transvalvular gradient.  Mean transvalvular gradient is 3 mmHg at a heart rate of 72 BPM.  Trace mitral regurgitation.  Estimated right ventricular systolic pressure is 39 mmHg.    Assessment & Plan     1. Longstanding persistent atrial fibrillation (HCC)        2. Moderate tricuspid regurgitation        3. S/P transcatheter mitral valve replacement (TMVR)        4. Agatston coronary artery calcium score between 100 and 400        5. Pure hypercholesterolemia        6. Secondary hypercoagulable state (HCC)            Medical Decision Making: Today's Assessment/Status/Plan:        S/P MitraCLip x 1, XTW, moderate TR  -doing well post-op  -no ASA, on eliquis   -SBE prophylaxis understands  -1 month post MitraClip echo showed normal valve function, mild mitral regurgitation  - surveillance with annual echocardiogram     Atrial  fibrillation, possible NSVT  -eliquis 5 mg BID  -metoprolol 25 mg BID  -Cardiac event monitor showed 100% atrial fibrillation burden   -We did discuss a possible referral to EP for rhythm control strategy, he would like to defer this for now as he is essentially asymptomatic     Coronary Artery Disease, HLD  - LDL at goal of < 70, cont rosuvastatin 20 mg daily  We addressed the management of atherosclerotic artery disease.  He is on proper antiplatelet, cholesterol management and beta-blockers as appropriate.  We addressed the potential side effects and laboratory follow-up for these medications.     Follow up with ANT Sweet in August as scheduled     This note was dictated using Dragon speech recognition software.

## 2025-05-08 ENCOUNTER — APPOINTMENT (OUTPATIENT)
Dept: INTERNAL MEDICINE | Facility: OTHER | Age: 85
End: 2025-05-08
Payer: MEDICARE

## 2025-05-08 VITALS
HEART RATE: 74 BPM | TEMPERATURE: 98.1 F | BODY MASS INDEX: 24.89 KG/M2 | DIASTOLIC BLOOD PRESSURE: 74 MMHG | OXYGEN SATURATION: 94 % | SYSTOLIC BLOOD PRESSURE: 113 MMHG | WEIGHT: 164.2 LBS | HEIGHT: 68 IN

## 2025-05-08 DIAGNOSIS — D64.9 ANEMIA, UNSPECIFIED TYPE: ICD-10-CM

## 2025-05-08 DIAGNOSIS — E11.9 TYPE 2 DIABETES MELLITUS WITHOUT COMPLICATION, WITHOUT LONG-TERM CURRENT USE OF INSULIN (HCC): Chronic | ICD-10-CM

## 2025-05-08 DIAGNOSIS — E11.9 TYPE 2 DIABETES MELLITUS WITHOUT COMPLICATION, UNSPECIFIED WHETHER LONG TERM INSULIN USE (HCC): ICD-10-CM

## 2025-05-08 DIAGNOSIS — I48.11 LONGSTANDING PERSISTENT ATRIAL FIBRILLATION (HCC): Chronic | ICD-10-CM

## 2025-05-08 DIAGNOSIS — E03.9 HYPOTHYROIDISM, UNSPECIFIED TYPE: Chronic | ICD-10-CM

## 2025-05-08 DIAGNOSIS — E78.00 PURE HYPERCHOLESTEROLEMIA: ICD-10-CM

## 2025-05-08 PROCEDURE — 3074F SYST BP LT 130 MM HG: CPT | Mod: GC

## 2025-05-08 PROCEDURE — 99214 OFFICE O/P EST MOD 30 MIN: CPT | Mod: GC

## 2025-05-08 PROCEDURE — 3078F DIAST BP <80 MM HG: CPT | Mod: GC

## 2025-05-08 RX ORDER — FERROUS SULFATE 325(65) MG
325 TABLET ORAL DAILY
Qty: 100 TABLET | Refills: 1 | Status: SHIPPED | OUTPATIENT
Start: 2025-05-08

## 2025-05-08 RX ORDER — FUROSEMIDE 20 MG/1
20 TABLET ORAL DAILY
Qty: 100 TABLET | Refills: 2 | Status: SHIPPED | OUTPATIENT
Start: 2025-05-08

## 2025-05-08 ASSESSMENT — ENCOUNTER SYMPTOMS
HEADACHES: 0
EYE REDNESS: 0
WEAKNESS: 0
EYE PAIN: 0
MYALGIAS: 0
SEIZURES: 0
MEMORY LOSS: 0
BRUISES/BLEEDS EASILY: 0
DIZZINESS: 0
COUGH: 0
DEPRESSION: 0
FEVER: 0
SINUS PAIN: 0
SHORTNESS OF BREATH: 0
HALLUCINATIONS: 0
BLURRED VISION: 0
DOUBLE VISION: 0
DIARRHEA: 0
ABDOMINAL PAIN: 0
HEARTBURN: 0
INSOMNIA: 0
CONSTIPATION: 0
PND: 0
FOCAL WEAKNESS: 0
NERVOUS/ANXIOUS: 0
SORE THROAT: 0
TREMORS: 0
CHILLS: 0
PALPITATIONS: 0
FALLS: 0
WHEEZING: 0
WEIGHT LOSS: 0
ORTHOPNEA: 0

## 2025-05-08 ASSESSMENT — FIBROSIS 4 INDEX: FIB4 SCORE: 2.44

## 2025-05-08 NOTE — PROGRESS NOTES
Established Patient    Patient Care Team:  Demario Mo M.D. as PCP - General (Internal Medicine)    HPI:  David Owen is a 84 y.o. male with relevant past medical history of  type 2 diabetes without complication, hypothyroid, hyperlipidemia, and longstanding persistent A-fib on anticoagulation S/p mitral valve replacement and CAD who presents today for follow-up of recent lab tests.    The patient underwent mitral valve replacement in September 2024 and has been under cardiology follow-up. At today's visit, he reports feeling well without any complaints. He denies chest pain, palpitations, shortness of breath, dizziness, or headaches. He also reports being very compliant with his medication regimen, which was reconciled during this visit. His last A1c was 8.4% on 09/13/2024. Patient reports that walks 1-2 times per week for 15 minutes but he is planning to increase the time doing exercise. Also reports that his diet has not being strict in terms of carbohydrates but he can do more in order to decrease the amount of sugars and CHO in his current diet.    Preventative health measures:  Pneumococcal vaccine: refused  Eye exam: 2024, will visit eye DrHebert This year  Monofilament: performed during this visit.    Review of Systems   Constitutional:  Negative for chills, fever and weight loss.   HENT:  Negative for hearing loss, sinus pain, sore throat and tinnitus.    Eyes:  Negative for blurred vision, double vision, pain and redness.   Respiratory:  Negative for cough, shortness of breath and wheezing.    Cardiovascular:  Negative for chest pain, palpitations, orthopnea, leg swelling and PND.   Gastrointestinal:  Negative for abdominal pain, constipation, diarrhea and heartburn.   Genitourinary:  Negative for dysuria and hematuria.   Musculoskeletal:  Negative for falls, joint pain and myalgias.   Skin:  Negative for itching and rash.   Neurological:  Negative for dizziness, tremors,  focal weakness, seizures, weakness and headaches.   Endo/Heme/Allergies:  Does not bruise/bleed easily.   Psychiatric/Behavioral:  Negative for depression, hallucinations and memory loss. The patient is not nervous/anxious and does not have insomnia.    :    Past Medical History:   Diagnosis Date    Anginal syndrome (HCC)     Arrhythmia 2024, afib, medicated    Arthritis 2023    hands    Breath shortness 2024    with exertion    Cataract 2024    IOLOD    Diabetes ([facility]) 2024    medicated    Disorder of thyroid 2024    medicated    Heart valve disease 2024    mitral/tricuspid valve leakage    Hemorrhagic disorder ([facility]) 2024    eliquis    High cholesterol 2024    medicated    Hypertension 2024    medicated    Pneumonia 2024    history of       Social History     Tobacco Use    Smoking status: Former     Current packs/day: 0.00     Average packs/day: 1 pack/day for 20.0 years (20.0 ttl pk-yrs)     Types: Cigarettes     Start date:      Quit date:      Years since quittin.3    Smokeless tobacco: Never   Vaping Use    Vaping status: Never Used   Substance Use Topics    Alcohol use: Not Currently     Comment: quit     Drug use: Never       Current Outpatient Medications   Medication Sig Dispense Refill    ferrous sulfate 325 (65 Fe) MG tablet Take 1 Tablet by mouth every day. Only taking 3 times a week 100 Tablet 1    Empagliflozin 25 MG Tab Take 25 mg by mouth every day. 100 Tablet 3    furosemide (LASIX) 20 MG Tab Take 1 Tablet by mouth every day. 100 Tablet 2    metoprolol tartrate (LOPRESSOR) 25 MG Tab Take 1 Tablet by mouth 2 times a day. 200 Tablet 3    glucose blood strip 1 Strip by Other route as needed (for daily glycemia). 90 Strip 3    metformin (GLUCOPHAGE) 1000 MG tablet Take 1 Tablet by mouth 2 times a day with meals. 200 Tablet 1    FreeStyle Lancets Misc USE AS DIRECTED ONCE DAILY 100 Each 3    ELIQUIS 5 MG Tab Take 1  "Tablet by mouth 2 times a day. 180 Tablet 1    rosuvastatin (CRESTOR) 20 MG Tab TAKE 1 TABLET BY MOUTH EVERY EVENING 100 Tablet 3    Berberine Chloride (BERBERINE HCI PO) Take 1 Dose by mouth 2 times a day.      acetaminophen (TYLENOL) 500 MG Tab Take 1,000 mg by mouth every 6 hours as needed for Moderate Pain.      Non Formulary Request Take 390 mg by mouth every day. Raw thyroid synagistics complex      Non Formulary Request Take 2 Tablets by mouth 2 times a day. Joint supplement      Coral Calcium 1000 (390 Ca) MG Tab Take 1,000 mg by mouth 2 times a day.      zinc sulfate (ZINCATE) 220 (50 Zn) MG Cap Take 50 mg by mouth every evening.      ascorbic acid (VITAMIN C) 500 MG tablet Take 500 mg by mouth 2 times a day.      vitamin D3 (CHOLECALCIFEROL) 1000 Unit (25 mcg) Tab Take 5,000 Units by mouth 2 (two) times a day.       No current facility-administered medications for this visit.       /74 (BP Location: Right arm, Patient Position: Sitting, BP Cuff Size: Adult)   Pulse 74   Temp 36.7 °C (98.1 °F) (Temporal)   Ht 1.727 m (5' 8\")   Wt 74.5 kg (164 lb 3.2 oz)   SpO2 94%   BMI 24.97 kg/m²   Physical Exam  Constitutional:       General: He is not in acute distress.     Appearance: Normal appearance.   HENT:      Head: Normocephalic.      Right Ear: Tympanic membrane normal.      Left Ear: Tympanic membrane normal.      Nose: Nose normal. No congestion or rhinorrhea.      Mouth/Throat:      Mouth: Mucous membranes are moist.   Eyes:      General: No scleral icterus.     Extraocular Movements: Extraocular movements intact.      Conjunctiva/sclera: Conjunctivae normal.      Pupils: Pupils are equal, round, and reactive to light.   Cardiovascular:      Rate and Rhythm: Normal rate and regular rhythm.      Pulses: Normal pulses.      Heart sounds: Normal heart sounds. No murmur heard.     No gallop.   Pulmonary:      Effort: Pulmonary effort is normal. No respiratory distress.      Breath sounds: Normal " breath sounds. No wheezing, rhonchi or rales.   Chest:      Chest wall: No tenderness.   Abdominal:      General: Bowel sounds are normal. There is no distension.      Palpations: There is no mass.      Tenderness: There is no abdominal tenderness. There is no guarding or rebound.   Genitourinary:     Rectum: Normal.   Musculoskeletal:         General: No swelling or deformity. Normal range of motion.      Cervical back: Normal range of motion and neck supple.      Right lower leg: No edema.      Left lower leg: No edema.   Lymphadenopathy:      Cervical: No cervical adenopathy.   Skin:     General: Skin is warm.      Capillary Refill: Capillary refill takes less than 2 seconds.      Coloration: Skin is not jaundiced or pale.      Findings: No bruising, erythema or rash.   Neurological:      General: No focal deficit present.      Mental Status: He is alert and oriented to person, place, and time.      Sensory: No sensory deficit.      Motor: No weakness.      Gait: Gait normal.      Deep Tendon Reflexes: Reflexes normal.   Psychiatric:         Mood and Affect: Mood normal.         Behavior: Behavior normal.         Thought Content: Thought content normal.         Judgment: Judgment normal.           Assessment and Plan:     1. Longstanding persistent atrial fibrillation (HCC)  2. Moderate TR   The patient has a well-established diagnosis of atrial fibrillation and is being actively managed by cardiology. He reports excellent adherence to his current treatment regimen, which includes metoprolol and apixaban. He denies experiencing any symptoms such as palpitations, dyspnea, chest discomfort, or dizziness. He is post mitral valve replacement, performed in September 2024 and 1 month  s/p mitraclip.  He will continue with regular cardiology follow-up for ongoing surveillance and management.    3. Type 2 diabetes mellitus without complication, without long-term current use of insulin (HCC)  The patient has a  longstanding history of type 2 diabetes mellitus, currently controlled with dual oral therapy. His most recent hemoglobin A1c was 8.3%, which his prior primary care provider considered acceptable in light of his individualized hypoglycemia risk profile. It was noted that while current guidelines recommend an A1c target below 7.5-8% for patients with coronary artery disease, age and comorbidities support a more tailored approach in certain cases. So will increase the jardiance to 25 mg with no more changes needed. Glycemic control will continue to be monitored, with a repeat A1c planned, and any necessary therapeutic changes will be addressed at follow-up    Plan:  - Increase jardiance to from 10 to 25mg  - Diabetic Monofilament LE Exam  - HEMOGLOBIN A1C; Future      4. Pure hypercholesterolemia  The patient is under regular cardiology follow-up and is currently taking rosuvastatin 20 mg daily. He reports consistent adherence to his medication regimen and denies any adverse effects. His latest lipid panel shows that his LDL is at target (<70 mg/dL). Given his stable and well-managed lipid profile, no changes are indicated at this time, and he will continue on his current statin therapy, with routine monitoring as clinically appropriate.    Plan:  - Continue rosuvastatin 20 mg daily       5. Hypothyroidism, unspecified type  The patient has a longstanding history of hypothyroidism. His last TSH measurement was two years ago and was within normal ranges. His most recent TSH is in normal ranges so no additional laboratory tests or medications are indicated at this point.     6.Monofilament test  A monofilament test was performed during this visit to assess for peripheral sensory neuropathy, particularly in the context of the patient's diabetes risk. The results were normal, indicating preserved protective sensation in the feet at this time. The absence of sensory deficits suggests no current evidence of diabetic  peripheral neuropathy. The patient was counseled on the importance of regular foot care and routine screening to prevent future complications, particularly in the setting of chronic conditions such as diabetes or vascular disease. The test will be repeated annually or sooner if new symptoms arise.        Return in about 3 months (around 8/8/2025).      Demario Ramirez M.D., MPH. PGY-1 Internal Medicine  [facility]    This note was created using voice recognition software.  While every attempt is made to ensure accuracy of transcription, occasionally errors occur.

## (undated) DEVICE — SENSOR OXIMETER ADULT SPO2 RD SET (20EA/BX)

## (undated) DEVICE — CHLORAPREP 26 ML APPLICATOR - ORANGE TINT(25/CA)

## (undated) DEVICE — DECANTER FLD BLS - (50/CA)

## (undated) DEVICE — DELIVERY SYSTEM MITRACLIP

## (undated) DEVICE — ELECTRODE DUAL RETURN W/ CORD - (50/PK)

## (undated) DEVICE — PACK TAVR (3EA/CA)

## (undated) DEVICE — GLOVE SZ 6.5 BIOGEL PI MICRO - PF LF (50PR/BX)

## (undated) DEVICE — KIT INTROPERCUTANEOUS SHEATH - 8.5 FR W/MAX BARRIER AND BIOPATCH (5/CA)

## (undated) DEVICE — D-5-W INJ. 500 ML - (24EA/CA)

## (undated) DEVICE — COVER LIGHT HANDLE ALC PLUS DISP (18EA/BX)

## (undated) DEVICE — LACTATED RINGERS INJ 1000 ML - (14EA/CA 60CA/PF)

## (undated) DEVICE — STOPCOCK MALE 4-WAY - (50/CA)

## (undated) DEVICE — SET FLUID WARMING STANDARD FLOW - (10/CA)

## (undated) DEVICE — SUCTION INSTRUMENT YANKAUER BULBOUS TIP W/O VENT (50EA/CA)

## (undated) DEVICE — SET BIFURCATED BLOOD - (48EA/CS)

## (undated) DEVICE — AIRLESS LAP SPRAY TIP VISTASEAL (3EA/BX)

## (undated) DEVICE — NEEDLE INSFL 120MM 14GA VRRS - (20/BX)

## (undated) DEVICE — SODIUM CHL. INJ. 0.9% 500ML (24EA/CA 50CA/PF)

## (undated) DEVICE — TRANSDUCER BIFURCATED MONITORING KIT (10EA/CA)

## (undated) DEVICE — SHEARS MONOPOLAR CURVED  DA VINCI 10X'S REUSABLE

## (undated) DEVICE — DERMABOND ADVANCED - (12EA/BX)

## (undated) DEVICE — DRAPE ARM  BOX OF 20

## (undated) DEVICE — SOD. CHL. INJ. 0.9% 1000 ML - (14EA/CA 60CA/PF)

## (undated) DEVICE — DRAPE CLEAR W/ELASTIC BAND RAD CARM 40 X40" (20EA/CA)"

## (undated) DEVICE — GLOVE BIOGEL PI INDICATOR SZ 6.5 SURGICAL PF LF - (50/BX 4BX/CA)

## (undated) DEVICE — Device

## (undated) DEVICE — NEEDLE DRIVER MEGA SUTURECUT DA VINCI 15X'S REUSABLE

## (undated) DEVICE — GLOVE SZ 7 BIOGEL PI MICRO - PF LF (50PR/BX 4BX/CA)

## (undated) DEVICE — BLADE SURGICAL #15 - (50/BX 3BX/CA)

## (undated) DEVICE — SUTURE 2-0 STRATAFIX SPIRAL PDS SH (12EA/BX)

## (undated) DEVICE — GLOVESZ 8.5 BIOGEL PI MICRO - PF LF (50PR/BX)

## (undated) DEVICE — BAG RESUSCITATION DISPOSABLE - WITH MASK (10 EA/CA)

## (undated) DEVICE — KIT NRG RF TRANSSEPTAL WITH STANDARD CURVE NEEDLE

## (undated) DEVICE — ELECTRODE RADIOLUCNT SOLID GEL DEFIB PADS (12EA/CA)

## (undated) DEVICE — COVER TIP ENDOWRIST HOT SHEAR - (10EA/BX) DA VINCI

## (undated) DEVICE — TUBING CLEARLINK DUO-VENT - C-FLO (48EA/CA)

## (undated) DEVICE — PACK SINGLE BASIN - (6/CA)

## (undated) DEVICE — GOWN WARMING STANDARD FLEX - (30/CA)

## (undated) DEVICE — SYS DLV COST CLS RM TEMP - INJECTATE (CO-SET II) (10EA/CA)

## (undated) DEVICE — SUTURE 3-0 VICRYL PLUS SH - 27 INCH (36/BX)

## (undated) DEVICE — SEALANT TISSUE CLOSURE KIT FIBIRIN VISTASEAL 10ML (1EA/BX)

## (undated) DEVICE — CATHETER THERMALDILUTION SWAN - (5EA/CA)

## (undated) DEVICE — KIT ULTRASND COVER - (20EA/CA)

## (undated) DEVICE — DRAPE COLUMN  BOX OF 20

## (undated) DEVICE — SET EXTENSION WITH 2 PORTS (48EA/CA) ***PART #2C8610 IS A SUBSTITUTE*****

## (undated) DEVICE — SLEEVE VASO CALF MED - (10PR/CA)

## (undated) DEVICE — SUTURE DEVICE CLOSURE REPAIR SYSTEM PERCLOSE PROSTYLE (10EA/PK)

## (undated) DEVICE — TUBING PRSS MNTR 72IN M/ M LL - (25/BX) MONIT. LINE W/MALE L/L

## (undated) DEVICE — CANISTER SUCTION 3000ML MECHANICAL FILTER AUTO SHUTOFF MEDI-VAC NONSTERILE LF DISP  (40EA/CA)

## (undated) DEVICE — GLOVE BIOGEL PI INDICATOR SZ 7.0 SURGICAL PF LF - (50/BX 4BX/CA)

## (undated) DEVICE — SUTURE GENERAL

## (undated) DEVICE — MICRODRIP PRIMARY VENTED 60 (48EA/CA) THIS WAS PART #2C8428 WHICH WAS DISCONTINUED

## (undated) DEVICE — STOPCOCK IV 400 PSI 3W ROT (50EA/BX)

## (undated) DEVICE — TROCAR 5X100 NON BLADED Z-TH - READ KII (6/BX)

## (undated) DEVICE — SYSTEM CLEARIFY VISUALIZATION (10EA/PK)

## (undated) DEVICE — OBTURATOR BLADELESS STANDARD 8MM (6EA/BX)

## (undated) DEVICE — DRESSING TRANSPARENT FILM TEGADERM 4 X 4.75" (50EA/BX)"

## (undated) DEVICE — BIPOLAR FORCE DA VINCI 12X'S REISABLE

## (undated) DEVICE — SODIUM CHL IRRIGATION 0.9% 1000ML (12EA/CA)

## (undated) DEVICE — SET LEADWIRE 5 LEAD BEDSIDE DISPOSABLE ECG (1SET OF 5/EA)

## (undated) DEVICE — TOWELS CLOTH SURGICAL - (4/PK 20PK/CA)

## (undated) DEVICE — GLOVE BIOGEL PI INDICATOR SZ 8.0 SURGICAL PF LF -(50/BX 4BX/CA)

## (undated) DEVICE — KIT VASCULAR DILATOR

## (undated) DEVICE — GOWN SURGEONS X-LARGE - DISP. (30/CA)

## (undated) DEVICE — CANISTER SUCTION 3000ML MECHANICAL FILTER AUTO SHUTOFF MEDI-VAC NONSTERILE LF DISP (40EA/CA)

## (undated) DEVICE — SUTURE 4-0 MONOCRYL PLUS PS-1 - 27 INCH (36/BX)

## (undated) DEVICE — KIT RADIAL ARTERY 20GA W/MAX BARRIER AND BIOPATCH (5EA/CA) #10740 IS FOR THE SET RADIAL ARTERIAL

## (undated) DEVICE — INTRODUCER CATHETER DILATOR PROTRUDING 8FR 2.5CM (10EA/BX)